# Patient Record
Sex: MALE | Race: BLACK OR AFRICAN AMERICAN | NOT HISPANIC OR LATINO | Employment: OTHER | URBAN - METROPOLITAN AREA
[De-identification: names, ages, dates, MRNs, and addresses within clinical notes are randomized per-mention and may not be internally consistent; named-entity substitution may affect disease eponyms.]

---

## 2017-03-28 LAB — HBA1C MFR BLD HPLC: 5.8 %

## 2018-06-04 LAB
CREAT ?TM UR-SCNC: 156 UMOL/L
HBA1C MFR BLD HPLC: 5.5 %
MICROALBUMIN UR-MCNC: 0.7 MG/L (ref 0–20)
MICROALBUMIN/CREAT UR: 4 MG/G{CREAT}

## 2018-09-10 ENCOUNTER — TELEPHONE (OUTPATIENT)
Dept: UROLOGY | Facility: AMBULATORY SURGERY CENTER | Age: 68
End: 2018-09-10

## 2018-09-10 NOTE — TELEPHONE ENCOUNTER
Reason for appointment/Complaint/Diagnosis : hx of elevated PSA     Insurance: Cy Kohli     History of [address]     Previous urologist?   [facility] cancer phone: [phone]              Records requested/where? Previous urologist     Outside testing/where? Previous urologist     Location Preference for office visit?  [facility]

## 2018-09-11 ENCOUNTER — TELEPHONE (OUTPATIENT)
Dept: UROLOGY | Facility: AMBULATORY SURGERY CENTER | Age: 68
End: 2018-09-11

## 2018-09-11 NOTE — TELEPHONE ENCOUNTER
Reason for appointment/Complaint/Diagnosis :     Insurance: Qing Morgan  If yes, what kind? Previous urologist? [address] [place]                 Records requested/where? YES RECORDS RECEIVED    Outside testing/where? [facility]    Location Preference for office visit?  [facility]

## 2018-09-24 NOTE — PROGRESS NOTES
9/25/2018    Jorden Fitting  [birth year]  [id number]    Discussion and Plan    Regarding his elevated PSA, I have recommend he undergo PSA evaluation at 6 month intervals for monitoring  Multiparametric MRI can be considered further PSA progression noted  Risks and benefits of various treatments for BPH also explained  As he is relatively asymptomatic with a postvoid residual of 26 cc, he will be observed for now  All questions answered at this time  1  Urinary retention  - POCT Measure PVR    2  Elevated PSA  - PSA Total, Diagnostic; Future  - PSA Total, Diagnostic; Future      Assessment      Patient Active Problem List   Diagnosis    Urinary retention    Elevated PSA       History of Present Illness    Terrence Pool is a 70 y o  male seen today in regards to a history of elevated PSA of 9 03  He managed by a urologist in [place] and underwent prostate biopsies April 2018 which were negative  Prostate volume was greater than 110 cc  The patient denies any issues relative to hematuria, urinary tract infection  No significant urinary complaints other than sporadic daytime urgency with otherwise fair flow incomplete bladder emptying sensation  He tried Flomax previously but did not tolerate medicine  Urinary Symptom Assessment        Past Medical History  Past Medical History:   Diagnosis Date    Constipation        Past Social History  Past Surgical History:   Procedure Laterality Date    ABDOMINAL SURGERY      HERNIA REPAIR  2010    HERNIA REPAIR  2016       Past Family History  Family History   Problem Relation Age of Onset    Hypertension Mother     Uterine cancer Sister        Past Social history  Social History     Social History    Marital status: /Civil Union     Spouse name: N/A    Number of children: N/A    Years of education: N/A     Occupational History    Not on file       Social History Main Topics    Smoking status: Never Smoker    Smokeless tobacco: Never Used    Alcohol use No    Drug use: No    Sexual activity: Not on file     Other Topics Concern    Not on file     Social History Narrative    No narrative on file       Current Medications  Current Outpatient Prescriptions   Medication Sig Dispense Refill    aspirin 81 mg chewable tablet Daily      docusate sodium (COLACE) 100 mg capsule daily      Multiple Vitamins-Minerals (PRESERVISION AREDS 2+MULTI VIT PO) 1 tab twice a day      Omega-3 1000 MG CAPS daily      Pediatric Multivitamins-Fl (MULTIVITAMINS/FL PO) daily      Probiotic Product (PROBIOTIC-10 PO) daily       No current facility-administered medications for this visit  Allergies  No Known Allergies    Past Medical History, Social History, Family History, medications and allergies were reviewed  Review of Systems  Review of Systems   Constitutional: Negative  HENT: Negative  Eyes: Negative  Respiratory: Negative  Cardiovascular: Negative  Gastrointestinal: Negative  Endocrine: Negative  Genitourinary: Positive for urgency  Negative for decreased urine volume, difficulty urinating and hematuria  Musculoskeletal: Negative  Skin: Negative  Neurological: Negative  Hematological: Negative  Psychiatric/Behavioral: Negative  Vitals  Vitals:    09/25/18 1022   BP: 118/82   BP Location: Left arm   Patient Position: Sitting   Cuff Size: Adult   Pulse: 74   Weight: 70 8 kg (156 lb)   Height: 5' 9" (1 753 m)         Physical Exam    Physical Exam   Constitutional: He is oriented to person, place, and time  He appears well-developed and well-nourished  HENT:   Head: Normocephalic and atraumatic  Eyes: Pupils are equal, round, and reactive to light  Neck: Normal range of motion  Cardiovascular: Normal rate, regular rhythm and normal heart sounds  Pulmonary/Chest: Effort normal and breath sounds normal  No accessory muscle usage  No respiratory distress  Abdominal: Soft   Normal appearance and bowel sounds are normal  There is no tenderness  Genitourinary: Rectum normal, prostate normal and penis normal  No penile tenderness  Genitourinary Comments: Prostate greater than 70 g with no nodules  Musculoskeletal: Normal range of motion  Neurological: He is alert and oriented to person, place, and time  Skin: Skin is warm, dry and intact  Psychiatric: He has a normal mood and affect  His speech is normal  Cognition and memory are normal    Nursing note and vitals reviewed  Results    Below listed labs, pathology results, and radiology images were personally reviewed:    No results found for: PSA  No results found for: GLUCOSE, CALCIUM, NA, K, CO2, CL, BUN, CREATININE  No results found for: WBC, HGB, HCT, MCV, PLT    No results found for this or any previous visit (from the past 1 hour(s))  ]      Post Void Residual Measurement:  After voiding to completion the patient was brought to the exam room and positioned supine    Residual urine volume was measured with an ultrasound at:    26 ml

## 2018-09-25 ENCOUNTER — OFFICE VISIT (OUTPATIENT)
Dept: UROLOGY | Facility: AMBULATORY SURGERY CENTER | Age: 68
End: 2018-09-25
Payer: COMMERCIAL

## 2018-09-25 VITALS
SYSTOLIC BLOOD PRESSURE: 118 MMHG | BODY MASS INDEX: 23.11 KG/M2 | HEIGHT: 69 IN | WEIGHT: 156 LBS | DIASTOLIC BLOOD PRESSURE: 82 MMHG | HEART RATE: 74 BPM

## 2018-09-25 DIAGNOSIS — R33.9 URINARY RETENTION: Primary | ICD-10-CM

## 2018-09-25 DIAGNOSIS — R97.20 ELEVATED PSA: ICD-10-CM

## 2018-09-25 LAB — POST-VOID RESIDUAL VOLUME, ML POC: 26 ML

## 2018-09-25 PROCEDURE — 51798 US URINE CAPACITY MEASURE: CPT | Performed by: UROLOGY

## 2018-09-25 PROCEDURE — 99203 OFFICE O/P NEW LOW 30 MIN: CPT | Performed by: UROLOGY

## 2018-09-25 RX ORDER — CHLORAL HYDRATE 500 MG
CAPSULE ORAL
COMMUNITY
End: 2020-10-08 | Stop reason: ALTCHOICE

## 2018-09-25 RX ORDER — ASPIRIN 81 MG/1
81 TABLET, CHEWABLE ORAL DAILY
COMMUNITY
End: 2019-01-09

## 2018-09-25 RX ORDER — DOCUSATE SODIUM 100 MG/1
100 CAPSULE, LIQUID FILLED ORAL AS NEEDED
COMMUNITY

## 2018-11-16 ENCOUNTER — RX ONLY (RX ONLY)
Age: 68
End: 2018-11-16

## 2018-11-16 ENCOUNTER — DOCTOR'S OFFICE (OUTPATIENT)
Dept: URBAN - METROPOLITAN AREA CLINIC 137 | Facility: CLINIC | Age: 68
Setting detail: OPHTHALMOLOGY
End: 2018-11-16
Payer: COMMERCIAL

## 2018-11-16 DIAGNOSIS — H40.033: ICD-10-CM

## 2018-11-16 DIAGNOSIS — H40.013: ICD-10-CM

## 2018-11-16 PROCEDURE — 76514 ECHO EXAM OF EYE THICKNESS: CPT | Performed by: OPTOMETRIST

## 2018-11-16 PROCEDURE — 92133 CPTRZD OPH DX IMG PST SGM ON: CPT | Performed by: OPTOMETRIST

## 2018-11-16 PROCEDURE — 92004 COMPRE OPH EXAM NEW PT 1/>: CPT | Performed by: OPTOMETRIST

## 2018-11-16 ASSESSMENT — REFRACTION_CURRENTRX
OD_AXIS: 50
OD_SPHERE: +1.50
OD_VPRISM_DIRECTION: PROGS
OS_VPRISM_DIRECTION: PROGS
OS_OVR_VA: 20/
OD_OVR_VA: 20/
OS_ADD: +2.50
OS_OVR_VA: 20/
OS_SPHERE: +1.25
OD_OVR_VA: 20/
OS_CYLINDER: -0.75
OD_OVR_VA: 20/
OS_OVR_VA: 20/
OD_ADD: +2.50
OS_AXIS: 95
OD_CYLINDER: -0.75

## 2018-11-16 ASSESSMENT — PACHYMETRY
OS_CT_CORRECTION: 1
OD_CT_CORRECTION: 1
OD_CT_UM: 538
OS_CT_UM: 531

## 2018-11-16 ASSESSMENT — VISUAL ACUITY
OD_BCVA: 20/25-1
OS_BCVA: 20/25

## 2018-11-16 ASSESSMENT — REFRACTION_MANIFEST
OD_VA2: 20/
OD_VA3: 20/
OS_VA3: 20/
OD_VA3: 20/
OU_VA: 20/
OS_VA1: 20/
OD_VA1: 20/
OD_VA1: 20/
OU_VA: 20/
OS_VA3: 20/
OD_VA2: 20/
OS_VA1: 20/
OS_VA2: 20/
OS_VA2: 20/

## 2018-11-16 ASSESSMENT — REFRACTION_AUTOREFRACTION
OD_AXIS: 49
OS_SPHERE: +2.25
OD_CYLINDER: -0.50
OS_AXIS: 77
OS_CYLINDER: -1.00
OD_SPHERE: +1.75

## 2018-11-16 ASSESSMENT — SPHEQUIV_DERIVED
OS_SPHEQUIV: 1.75
OD_SPHEQUIV: 1.5

## 2018-11-16 ASSESSMENT — CONFRONTATIONAL VISUAL FIELD TEST (CVF)
OS_FINDINGS: FULL
OD_FINDINGS: FULL

## 2019-01-02 ENCOUNTER — DOCTOR'S OFFICE (OUTPATIENT)
Dept: URBAN - METROPOLITAN AREA CLINIC 137 | Facility: CLINIC | Age: 69
Setting detail: OPHTHALMOLOGY
End: 2019-01-02
Payer: COMMERCIAL

## 2019-01-02 DIAGNOSIS — H40.033: ICD-10-CM

## 2019-01-02 DIAGNOSIS — H40.013: ICD-10-CM

## 2019-01-02 PROCEDURE — 99212 OFFICE O/P EST SF 10 MIN: CPT | Performed by: OPTOMETRIST

## 2019-01-02 PROCEDURE — 92083 EXTENDED VISUAL FIELD XM: CPT | Performed by: OPTOMETRIST

## 2019-01-02 ASSESSMENT — REFRACTION_MANIFEST
OS_VA2: 20/
OS_VA2: 20/
OS_VA1: 20/
OD_VA1: 20/
OD_VA1: 20/
OS_VA3: 20/
OU_VA: 20/
OU_VA: 20/
OD_VA2: 20/
OD_VA3: 20/
OD_VA3: 20/
OD_VA2: 20/
OS_VA1: 20/
OS_VA3: 20/

## 2019-01-02 ASSESSMENT — REFRACTION_AUTOREFRACTION
OS_SPHERE: +2.25
OD_AXIS: 49
OD_SPHERE: +1.75
OD_CYLINDER: -0.50
OS_AXIS: 77
OS_CYLINDER: -1.00

## 2019-01-02 ASSESSMENT — REFRACTION_CURRENTRX
OD_OVR_VA: 20/
OD_OVR_VA: 20/
OD_VPRISM_DIRECTION: PROGS
OD_OVR_VA: 20/
OS_CYLINDER: -0.75
OD_SPHERE: +1.50
OS_OVR_VA: 20/
OS_AXIS: 95
OS_OVR_VA: 20/
OS_VPRISM_DIRECTION: PROGS
OD_AXIS: 50
OD_CYLINDER: -0.75
OS_OVR_VA: 20/
OD_ADD: +2.50
OS_SPHERE: +1.25
OS_ADD: +2.50

## 2019-01-02 ASSESSMENT — CONFRONTATIONAL VISUAL FIELD TEST (CVF)
OD_FINDINGS: FULL
OS_FINDINGS: FULL

## 2019-01-02 ASSESSMENT — VISUAL ACUITY
OS_BCVA: 20/25
OD_BCVA: 20/25

## 2019-01-02 ASSESSMENT — SPHEQUIV_DERIVED
OD_SPHEQUIV: 1.5
OS_SPHEQUIV: 1.75

## 2019-01-09 ENCOUNTER — OFFICE VISIT (OUTPATIENT)
Dept: INTERNAL MEDICINE CLINIC | Facility: CLINIC | Age: 69
End: 2019-01-09
Payer: COMMERCIAL

## 2019-01-09 VITALS
DIASTOLIC BLOOD PRESSURE: 68 MMHG | TEMPERATURE: 98 F | OXYGEN SATURATION: 98 % | SYSTOLIC BLOOD PRESSURE: 108 MMHG | HEART RATE: 72 BPM | WEIGHT: 156.6 LBS | BODY MASS INDEX: 21.92 KG/M2 | HEIGHT: 71 IN

## 2019-01-09 DIAGNOSIS — E55.9 VITAMIN D DEFICIENCY: ICD-10-CM

## 2019-01-09 DIAGNOSIS — Z23 NEED FOR PNEUMOCOCCAL VACCINE: ICD-10-CM

## 2019-01-09 DIAGNOSIS — Z12.11 SCREENING FOR COLON CANCER: Primary | ICD-10-CM

## 2019-01-09 DIAGNOSIS — E56.9 VITAMIN DEFICIENCY: ICD-10-CM

## 2019-01-09 DIAGNOSIS — Z12.12 ENCOUNTER FOR COLORECTAL CANCER SCREENING: ICD-10-CM

## 2019-01-09 DIAGNOSIS — E78.2 MIXED HYPERLIPIDEMIA: ICD-10-CM

## 2019-01-09 DIAGNOSIS — Z12.11 ENCOUNTER FOR COLORECTAL CANCER SCREENING: ICD-10-CM

## 2019-01-09 DIAGNOSIS — K40.91 RECURRENT INGUINAL HERNIA WITHOUT OBSTRUCTION OR GANGRENE, UNSPECIFIED LATERALITY: ICD-10-CM

## 2019-01-09 DIAGNOSIS — R73.09 ELEVATED HEMOGLOBIN A1C: ICD-10-CM

## 2019-01-09 DIAGNOSIS — Z23 IMMUNIZATION DUE: ICD-10-CM

## 2019-01-09 PROBLEM — K40.90 INGUINAL HERNIA: Status: ACTIVE | Noted: 2019-01-09

## 2019-01-09 PROBLEM — E78.5 HYPERLIPIDEMIA: Status: ACTIVE | Noted: 2019-01-09

## 2019-01-09 PROBLEM — Z00.00 ANNUAL PHYSICAL EXAM: Status: ACTIVE | Noted: 2019-01-09

## 2019-01-09 PROBLEM — Q43.3 INTESTINAL MALROTATION: Status: ACTIVE | Noted: 2019-01-09

## 2019-01-09 PROCEDURE — 99203 OFFICE O/P NEW LOW 30 MIN: CPT | Performed by: INTERNAL MEDICINE

## 2019-01-09 PROCEDURE — 4040F PNEUMOC VAC/ADMIN/RCVD: CPT

## 2019-01-09 PROCEDURE — 1101F PT FALLS ASSESS-DOCD LE1/YR: CPT | Performed by: INTERNAL MEDICINE

## 2019-01-09 PROCEDURE — 90670 PCV13 VACCINE IM: CPT

## 2019-01-09 PROCEDURE — 3008F BODY MASS INDEX DOCD: CPT | Performed by: INTERNAL MEDICINE

## 2019-01-09 PROCEDURE — 3725F SCREEN DEPRESSION PERFORMED: CPT | Performed by: INTERNAL MEDICINE

## 2019-01-09 PROCEDURE — 1036F TOBACCO NON-USER: CPT | Performed by: INTERNAL MEDICINE

## 2019-01-09 PROCEDURE — 1160F RVW MEDS BY RX/DR IN RCRD: CPT

## 2019-01-09 PROCEDURE — G0009 ADMIN PNEUMOCOCCAL VACCINE: HCPCS

## 2019-01-09 RX ORDER — MULTIVITAMIN
1 TABLET ORAL DAILY
COMMUNITY

## 2019-01-09 RX ORDER — THIAMINE HCL 100 MG
1 TABLET ORAL DAILY
COMMUNITY

## 2019-01-09 RX ORDER — INFLUENZA A VIRUSA/MICHIGAN/45/2015 X-275 (H1N1) ANTIGEN (FORMALDEHYDE INACTIVATED), INFLUENZA A VIRUS A/HONG KONG/4801/2014 X-263B (H3N2) ANTIGEN (FORMALDEHYDE INACTIVATED), AND INFLUENZA B VIRUS B/BRISBANE/60/2008 ANTIGEN (FORMALDEHYDE INACTIVATED) 60; 60; 60 UG/.5ML; UG/.5ML; UG/.5ML
INJECTION, SUSPENSION INTRAMUSCULAR
COMMUNITY
Start: 2018-10-03 | End: 2019-06-03

## 2019-01-09 NOTE — ASSESSMENT & PLAN NOTE
Regarding immunization:  He has never received pneumococcal immunization, and received zoster immunization in 2016 (not Shingrix)  Otherwise, he is also due for Tdap as last immunization was greater than 10 years ago  He has received influenza immunization this flu season  He is up-to-date on colorectal cancer screening as well as metabolic screening with blood work

## 2019-01-09 NOTE — ASSESSMENT & PLAN NOTE
Due for pneumococcal immunization, shingrix, and Tdap  He will of contact his pharmacy regarding Shingrix  Pneumococcal 13 Valent administered today, 23 Valent to be administered in 1 year  Otherwise, he is also due for Tdap as last immunization was greater than 10 years ago  He has received influenza immunization this flu season

## 2019-01-09 NOTE — PROGRESS NOTES
Assessment/Plan:    Immunization due  Due for pneumococcal immunization, shingrix, and Tdap  He will of contact his pharmacy regarding Shingrix  Pneumococcal 13 Valent administered today, 23 Valent to be administered in 1 year  Otherwise, he is also due for Tdap as last immunization was greater than 10 years ago  He has received influenza immunization this flu season  Encounter for colorectal cancer screening  Next colonoscopy due in 2022  Hyperlipidemia  Stable  Continue with lifestyle modifications  Recheck lipid panel in 6 months  Vitamin deficiency  Stable  Continue with vitamin supplementation  Recheck vitamin-D in 6 months  Elevated hemoglobin A1c  Stable  Continue with lifestyle modifications  Will recheck A1c in 6 months  Diagnoses and all orders for this visit:    Screening for colon cancer    Immunization due    Encounter for colorectal cancer screening    Elevated hemoglobin A1c  -     Cancel: Hemoglobin A1C; Future  -     Comprehensive metabolic panel; Future  -     Hemoglobin A1C; Future  -     TSH, 3rd generation with Free T4 reflex; Future    Vitamin D deficiency  -     Vitamin D 25 hydroxy; Future    Need for pneumococcal vaccine  -     PNEUMOCOCCAL CONJUGATE VACCINE 13-VALENT GREATER THAN 6 MONTHS    Mixed hyperlipidemia  -     CBC; Future  -     Comprehensive metabolic panel; Future  -     Lipid panel; Future  -     Hemoglobin A1C; Future  -     TSH, 3rd generation with Free T4 reflex; Future    Recurrent inguinal hernia without obstruction or gangrene, unspecified laterality    Vitamin deficiency    Other orders  -     Cancel: Ambulatory referral to Gastroenterology; Future  -     Multiple Vitamin (MULTIVITAMIN) tablet; Take 1 tablet by mouth daily  -     Cyanocobalamin (VITAMIN B-12) 2500 MCG SUBL; Place 1 tablet under the tongue daily  -     FLUZONE HIGH-DOSE 0 5 ML HITESH;   -     Cancel: CBC; Future  -     Cancel: Comprehensive metabolic panel;  Future  - Cancel: Lipid panel; Future  -     Cancel: TSH, 3rd generation with Free T4 reflex; Future          Subjective:      Patient ID: Valeriy Mata is a 70 y o  male  65-year-old male is seen today to establish care  He has a past medical history of multiple inguinal hernias status post repairs, intestinal malrotation, h/o HLD (no longer on statin therapy), BPH, and vitamin-D deficiency  He has an appointment scheduled with Urology in March 2019  No active complaints or concerns at this time  Lab studies from 6/2018 reviewed, of lipid panel, TSH, CMP, CBC and urinalysis were within normal range  Last colonoscopy was 2012, next due in 2022  He never smoke tobacco, rare alcohol use, and denies any illicit drug use  He exercises regularly and eats a healthy diet  The following portions of the patient's history were reviewed and updated as appropriate: allergies, current medications, past family history, past medical history, past social history, past surgical history and problem list     Review of Systems   Constitutional: Negative for activity change, appetite change, chills, diaphoresis, fatigue and fever  HENT: Negative for congestion, postnasal drip, rhinorrhea, sinus pain, sinus pressure, sneezing and sore throat  Eyes: Negative for visual disturbance  Respiratory: Negative for apnea, cough, choking, chest tightness, shortness of breath and wheezing  Cardiovascular: Negative for chest pain, palpitations and leg swelling  Gastrointestinal: Negative for abdominal distention, abdominal pain, anal bleeding, blood in stool, constipation, diarrhea, nausea and vomiting  Endocrine: Negative for cold intolerance and heat intolerance  Genitourinary: Negative for difficulty urinating, dysuria and hematuria  Musculoskeletal: Negative  Skin: Negative  Neurological: Negative for dizziness, weakness, light-headedness, numbness and headaches  Hematological: Negative for adenopathy  Psychiatric/Behavioral: Negative for agitation, sleep disturbance and suicidal ideas  All other systems reviewed and are negative  Past Medical History:   Diagnosis Date    Constipation          Current Outpatient Prescriptions:     Cyanocobalamin (VITAMIN B-12) 2500 MCG SUBL, Place 1 tablet under the tongue daily, Disp: , Rfl:     docusate sodium (COLACE) 100 mg capsule, 1 capsule daily  , Disp: , Rfl:     FLUZONE HIGH-DOSE 0 5 ML HITESH, , Disp: , Rfl:     Multiple Vitamin (MULTIVITAMIN) tablet, Take 1 tablet by mouth daily, Disp: , Rfl:     Omega-3 1000 MG CAPS, 1 capsule daily  , Disp: , Rfl:     Probiotic Product (PROBIOTIC-10 PO), 1 tablet daily  , Disp: , Rfl:     No Known Allergies    Social History   Past Surgical History:   Procedure Laterality Date    ABDOMINAL SURGERY      BUNIONECTOMY      HERNIA REPAIR  2010    HERNIA REPAIR  2016    HERNIA REPAIR       Family History   Problem Relation Age of Onset    Hypertension Mother     Uterine cancer Sister        Objective:  /68 (BP Location: Left arm, Patient Position: Sitting, Cuff Size: Adult)   Pulse 72   Temp 98 °F (36 7 °C) (Oral)   Ht 5' 11" (1 803 m)   Wt 71 kg (156 lb 9 6 oz)   SpO2 98% Comment: room air  BMI 21 84 kg/m²     No results found for this or any previous visit (from the past 1344 hour(s))  Physical Exam   Constitutional: He is oriented to person, place, and time  He appears well-developed and well-nourished  No distress  HENT:   Head: Normocephalic and atraumatic  Eyes: Pupils are equal, round, and reactive to light  Conjunctivae and EOM are normal  Right eye exhibits no discharge  Left eye exhibits no discharge  No scleral icterus  Neck: Normal range of motion  Neck supple  No JVD present  No thyromegaly present  Cardiovascular: Normal rate, regular rhythm, normal heart sounds and intact distal pulses  Exam reveals no gallop and no friction rub  No murmur heard    Pulmonary/Chest: Effort normal and breath sounds normal  No respiratory distress  He has no wheezes  He has no rales  He exhibits no tenderness  Abdominal: Soft  Bowel sounds are normal  He exhibits no distension and no mass  There is no tenderness  There is no rebound and no guarding  Musculoskeletal: Normal range of motion  He exhibits no edema, tenderness or deformity  Lymphadenopathy:     He has no cervical adenopathy  Neurological: He is alert and oriented to person, place, and time  He has normal reflexes  No cranial nerve deficit  Coordination normal    Skin: Skin is warm and dry  No rash noted  He is not diaphoretic  No erythema  No pallor  Psychiatric: He has a normal mood and affect  His behavior is normal  Judgment and thought content normal    Nursing note and vitals reviewed

## 2019-03-18 ENCOUNTER — APPOINTMENT (OUTPATIENT)
Dept: LAB | Facility: CLINIC | Age: 69
End: 2019-03-18
Payer: COMMERCIAL

## 2019-03-18 DIAGNOSIS — R73.09 ELEVATED HEMOGLOBIN A1C: ICD-10-CM

## 2019-03-18 DIAGNOSIS — R97.20 ELEVATED PSA: ICD-10-CM

## 2019-03-18 DIAGNOSIS — E78.2 MIXED HYPERLIPIDEMIA: ICD-10-CM

## 2019-03-18 LAB
PSA SERPL-MCNC: 5.1 NG/ML (ref 0–4)
TSH SERPL DL<=0.05 MIU/L-ACNC: 1.76 UIU/ML (ref 0.36–3.74)

## 2019-03-18 PROCEDURE — 36415 COLL VENOUS BLD VENIPUNCTURE: CPT

## 2019-03-18 PROCEDURE — 84443 ASSAY THYROID STIM HORMONE: CPT

## 2019-03-18 PROCEDURE — 84153 ASSAY OF PSA TOTAL: CPT

## 2019-03-22 NOTE — PROGRESS NOTES
3/25/2019    Rise Ramo  1950  [id number]      Assessment  -Elevated PSA  -Urinary retention     Discussion/Plan  Feng Solomon is a 70 y o  male being managed by Dr Christina Yadav        -We reviewed results of recent PSA which is 5 1  He is status post negative prostate biopsy in April 2018 by outside urologist   His PSA range is 2 6-9 03  Patient continues to do well from a urinary standpoint  We discussed rechecking PSA in 6 months  Will also perform digital rectal exam at that time  He was instructed to call with any issues  -All questions answered, patient agrees with plan      History of Present Illness  70 y o  male with a history of elevated PSA and urinary retention presents today for follow up  Patient previously known to urologist in [place]  He underwent prostate biopsy in April 2018 which was negative  His PSA was as high as 9 03  His prostate was found to be >110cc  Patient's PSA range is 2 6-9 03  He continues to report urgency and weak stream, however he does not find bothersome  He had previously been prescribed Flomax for acute urinary retention  However, this resolved and he discontinued medication  Patient denies any episodes of gross hematuria or dysuria  No reports of strong family history of prostate cancer  Review of Systems  Review of Systems   Constitutional: Negative  HENT: Negative  Respiratory: Negative  Cardiovascular: Negative  Gastrointestinal: Negative  Genitourinary: Negative for decreased urine volume, difficulty urinating, dysuria, flank pain, frequency and hematuria  Urgency: Occasional    Musculoskeletal: Negative  Skin: Negative  Neurological: Negative  Psychiatric/Behavioral: Negative  AUA SYMPTOM SCORE      Most Recent Value   AUA SYMPTOM SCORE   How often have you had a sensation of not emptying your bladder completely after you finished urinating?   2   How often have you had to urinate again less than two hours after you finished urinating? 3   How often have you found you stopped and started again several times when you urinate? 3   How often have you found it difficult to postpone urination? 2   How often have you had a weak urinary stream?  1   How often have you had to push or strain to begin urination? 1   How many times did you most typically get up to urinate from the time you went to bed at night until the time you got up in the morning?   1   Quality of Life: If you were to spend the rest of your life with your urinary condition just the way it is now, how would you feel about that?  2   AUA SYMPTOM SCORE  13            Past Medical History  Past Medical History:   Diagnosis Date    Constipation        Past Social History  Past Surgical History:   Procedure Laterality Date    ABDOMINAL SURGERY      BUNIONECTOMY      HERNIA REPAIR  2010    HERNIA REPAIR  2016    HERNIA REPAIR         Past Family History  Family History   Problem Relation Age of Onset    Hypertension Mother     Uterine cancer Sister        Past Social history  Social History     Socioeconomic History    Marital status: /Civil Union     Spouse name: Not on file    Number of children: Not on file    Years of education: Not on file    Highest education level: Not on file   Occupational History    Not on file   Social Needs    Financial resource strain: Not on file    Food insecurity:     Worry: Not on file     Inability: Not on file    Transportation needs:     Medical: Not on file     Non-medical: Not on file   Tobacco Use    Smoking status: Never Smoker    Smokeless tobacco: Never Used   Substance and Sexual Activity    Alcohol use: No    Drug use: No    Sexual activity: Not on file   Lifestyle    Physical activity:     Days per week: Not on file     Minutes per session: Not on file    Stress: Not on file   Relationships    Social connections:     Talks on phone: Not on file     Gets together: Not on file     Attends Mandaeism service: Not on file     Active member of club or organization: Not on file     Attends meetings of clubs or organizations: Not on file     Relationship status: Not on file    Intimate partner violence:     Fear of current or ex partner: Not on file     Emotionally abused: Not on file     Physically abused: Not on file     Forced sexual activity: Not on file   Other Topics Concern    Not on file   Social History Narrative    Not on file       Current Medications  Current Outpatient Medications   Medication Sig Dispense Refill    Cyanocobalamin (VITAMIN B-12) 2500 MCG SUBL Place 1 tablet under the tongue daily      docusate sodium (COLACE) 100 mg capsule 1 capsule daily   FLUZONE HIGH-DOSE 0 5 ML HITESH       Multiple Vitamin (MULTIVITAMIN) tablet Take 1 tablet by mouth daily      Omega-3 1000 MG CAPS 1 capsule daily   Probiotic Product (PROBIOTIC-10 PO) 1 tablet daily  No current facility-administered medications for this visit  Allergies  No Known Allergies    Past Medical History, Social History, Family History, medications and allergies were reviewed  Vitals  There were no vitals filed for this visit  Physical Exam  Physical Exam   Constitutional: He is oriented to person, place, and time  He appears well-developed and well-nourished  HENT:   Head: Normocephalic  Eyes: Pupils are equal, round, and reactive to light  Neck: Normal range of motion  Cardiovascular: Normal rate and regular rhythm  Pulmonary/Chest: Effort normal    Abdominal: Soft  Normal appearance  There is no CVA tenderness  Musculoskeletal: Normal range of motion  Neurological: He is alert and oriented to person, place, and time  Skin: Skin is warm and dry  Psychiatric: He has a normal mood and affect   His behavior is normal  Judgment and thought content normal        Results    I have personally reviewed all pertinent lab results and reviewed with patient  Lab Results   Component Value Date PSA 5 1 (H) 03/18/2019     No results found for: GLUCOSE, CALCIUM, NA, K, CO2, CL, BUN, CREATININE  No results found for: WBC, HGB, HCT, MCV, PLT  No results found for this or any previous visit (from the past 1 hour(s))

## 2019-03-25 ENCOUNTER — OFFICE VISIT (OUTPATIENT)
Dept: UROLOGY | Facility: AMBULATORY SURGERY CENTER | Age: 69
End: 2019-03-25
Payer: COMMERCIAL

## 2019-03-25 VITALS
HEIGHT: 69 IN | BODY MASS INDEX: 23.7 KG/M2 | HEART RATE: 60 BPM | DIASTOLIC BLOOD PRESSURE: 60 MMHG | WEIGHT: 160 LBS | SYSTOLIC BLOOD PRESSURE: 112 MMHG

## 2019-03-25 DIAGNOSIS — R97.20 ELEVATED PSA: Primary | ICD-10-CM

## 2019-03-25 DIAGNOSIS — Z87.898 HISTORY OF URINARY RETENTION: ICD-10-CM

## 2019-03-25 PROCEDURE — 99214 OFFICE O/P EST MOD 30 MIN: CPT | Performed by: NURSE PRACTITIONER

## 2019-03-27 ENCOUNTER — DOCTOR'S OFFICE (OUTPATIENT)
Dept: URBAN - METROPOLITAN AREA CLINIC 137 | Facility: CLINIC | Age: 69
Setting detail: OPHTHALMOLOGY
End: 2019-03-27
Payer: COMMERCIAL

## 2019-03-27 ENCOUNTER — RX ONLY (RX ONLY)
Age: 69
End: 2019-03-27

## 2019-03-27 DIAGNOSIS — H52.4: ICD-10-CM

## 2019-03-27 PROCEDURE — 92015 DETERMINE REFRACTIVE STATE: CPT | Performed by: OPTOMETRIST

## 2019-03-27 ASSESSMENT — REFRACTION_CURRENTRX
OS_OVR_VA: 20/
OD_OVR_VA: 20/
OD_SPHERE: +1.00
OD_OVR_VA: 20/
OD_CYLINDER: +0.25
OD_VPRISM_DIRECTION: PROGS
OS_SPHERE: +1.00
OS_VPRISM_DIRECTION: PROGS
OS_OVR_VA: 20/
OD_OVR_VA: 20/
OD_AXIS: 129
OS_OVR_VA: 20/
OS_CYLINDER: +0.50
OD_ADD: +2.25
OS_ADD: +2.25
OS_AXIS: 002

## 2019-03-27 ASSESSMENT — REFRACTION_MANIFEST
OS_AXIS: 090
OD_VA3: 20/
OS_SPHERE: +1.50
OD_CYLINDER: -0.50
OS_VA2: 20/
OU_VA: 20/
OD_SPHERE: +1.50
OD_VA3: 20/
OD_VA2: 20/
OD_ADD: +2.25
OS_VA1: 20/
OU_VA: 20/20
OS_VA3: 20/
OS_VA1: 20/20
OS_VA3: 20/
OD_VA1: 20/
OD_VA1: 20/20
OS_ADD: +2.25
OS_CYLINDER: -0.50
OD_AXIS: 130
OS_VA2: 20/
OD_VA2: 20/

## 2019-03-27 ASSESSMENT — SPHEQUIV_DERIVED
OD_SPHEQUIV: 1.25
OS_SPHEQUIV: 1.5
OS_SPHEQUIV: 1.25
OD_SPHEQUIV: 1.75

## 2019-03-27 ASSESSMENT — REFRACTION_AUTOREFRACTION
OD_SPHERE: +2.25
OS_CYLINDER: -1.50
OD_CYLINDER: -1.00
OS_SPHERE: +2.25
OS_AXIS: 88
OD_AXIS: 38

## 2019-03-27 ASSESSMENT — VISUAL ACUITY
OD_BCVA: 20/25
OS_BCVA: 20/20-1

## 2019-04-03 ENCOUNTER — DOCTOR'S OFFICE (OUTPATIENT)
Dept: URBAN - METROPOLITAN AREA CLINIC 137 | Facility: CLINIC | Age: 69
Setting detail: OPHTHALMOLOGY
End: 2019-04-03
Payer: COMMERCIAL

## 2019-04-03 DIAGNOSIS — H40.013: ICD-10-CM

## 2019-04-03 PROCEDURE — 99212 OFFICE O/P EST SF 10 MIN: CPT | Performed by: OPTOMETRIST

## 2019-04-03 ASSESSMENT — REFRACTION_MANIFEST
OD_AXIS: 130
OS_VA1: 20/20
OS_VA2: 20/
OS_VA2: 20/
OD_VA3: 20/
OD_VA1: 20/
OD_VA2: 20/
OS_AXIS: 090
OD_SPHERE: +1.50
OS_VA3: 20/
OD_VA2: 20/
OU_VA: 20/
OD_ADD: +2.25
OD_VA1: 20/20
OS_SPHERE: +1.50
OU_VA: 20/20
OD_CYLINDER: -0.50
OS_VA1: 20/
OS_ADD: +2.25
OS_CYLINDER: -0.50
OS_VA3: 20/
OD_VA3: 20/

## 2019-04-03 ASSESSMENT — REFRACTION_CURRENTRX
OS_ADD: +2.25
OS_SPHERE: +1.00
OS_OVR_VA: 20/
OS_OVR_VA: 20/
OS_VPRISM_DIRECTION: PROGS
OD_OVR_VA: 20/
OD_OVR_VA: 20/
OD_ADD: +2.25
OS_OVR_VA: 20/
OD_VPRISM_DIRECTION: PROGS
OS_CYLINDER: +0.50
OD_SPHERE: +1.00
OD_CYLINDER: +0.25
OD_OVR_VA: 20/
OD_AXIS: 129
OS_AXIS: 002

## 2019-04-03 ASSESSMENT — CONFRONTATIONAL VISUAL FIELD TEST (CVF)
OD_FINDINGS: FULL
OS_FINDINGS: FULL

## 2019-04-03 ASSESSMENT — REFRACTION_AUTOREFRACTION
OD_SPHERE: +2.25
OD_CYLINDER: -1.00
OS_SPHERE: +2.25
OS_CYLINDER: -1.50
OS_AXIS: 88
OD_AXIS: 38

## 2019-04-03 ASSESSMENT — SPHEQUIV_DERIVED
OD_SPHEQUIV: 1.75
OD_SPHEQUIV: 1.25
OS_SPHEQUIV: 1.5
OS_SPHEQUIV: 1.25

## 2019-04-03 ASSESSMENT — VISUAL ACUITY
OD_BCVA: 20/20-2
OS_BCVA: 20/20-2

## 2019-05-28 ENCOUNTER — APPOINTMENT (OUTPATIENT)
Dept: LAB | Facility: CLINIC | Age: 69
End: 2019-05-28
Payer: COMMERCIAL

## 2019-05-28 DIAGNOSIS — E55.9 VITAMIN D DEFICIENCY: ICD-10-CM

## 2019-05-28 DIAGNOSIS — E78.2 MIXED HYPERLIPIDEMIA: ICD-10-CM

## 2019-05-28 DIAGNOSIS — R73.09 ELEVATED HEMOGLOBIN A1C: ICD-10-CM

## 2019-05-28 LAB
25(OH)D3 SERPL-MCNC: 25.4 NG/ML (ref 30–100)
ALBUMIN SERPL BCP-MCNC: 4.1 G/DL (ref 3.5–5)
ALP SERPL-CCNC: 58 U/L (ref 46–116)
ALT SERPL W P-5'-P-CCNC: 18 U/L (ref 12–78)
ANION GAP SERPL CALCULATED.3IONS-SCNC: 7 MMOL/L (ref 4–13)
AST SERPL W P-5'-P-CCNC: 13 U/L (ref 5–45)
BILIRUB SERPL-MCNC: 0.42 MG/DL (ref 0.2–1)
BUN SERPL-MCNC: 19 MG/DL (ref 5–25)
CALCIUM SERPL-MCNC: 8.8 MG/DL (ref 8.3–10.1)
CHLORIDE SERPL-SCNC: 111 MMOL/L (ref 100–108)
CHOLEST SERPL-MCNC: 228 MG/DL (ref 50–200)
CO2 SERPL-SCNC: 26 MMOL/L (ref 21–32)
CREAT SERPL-MCNC: 1.11 MG/DL (ref 0.6–1.3)
ERYTHROCYTE [DISTWIDTH] IN BLOOD BY AUTOMATED COUNT: 18.2 % (ref 11.6–15.1)
EST. AVERAGE GLUCOSE BLD GHB EST-MCNC: 120 MG/DL
GFR SERPL CREATININE-BSD FRML MDRD: 78 ML/MIN/1.73SQ M
GLUCOSE P FAST SERPL-MCNC: 91 MG/DL (ref 65–99)
HBA1C MFR BLD: 5.8 % (ref 4.2–6.3)
HCT VFR BLD AUTO: 45.9 % (ref 36.5–49.3)
HDLC SERPL-MCNC: 92 MG/DL (ref 40–60)
HGB BLD-MCNC: 14 G/DL (ref 12–17)
LDLC SERPL CALC-MCNC: 124 MG/DL (ref 0–100)
MCH RBC QN AUTO: 22.1 PG (ref 26.8–34.3)
MCHC RBC AUTO-ENTMCNC: 30.5 G/DL (ref 31.4–37.4)
MCV RBC AUTO: 73 FL (ref 82–98)
NONHDLC SERPL-MCNC: 136 MG/DL
PLATELET # BLD AUTO: 263 THOUSANDS/UL (ref 149–390)
PMV BLD AUTO: 11 FL (ref 8.9–12.7)
POTASSIUM SERPL-SCNC: 4.1 MMOL/L (ref 3.5–5.3)
PROT SERPL-MCNC: 7.3 G/DL (ref 6.4–8.2)
RBC # BLD AUTO: 6.33 MILLION/UL (ref 3.88–5.62)
SODIUM SERPL-SCNC: 144 MMOL/L (ref 136–145)
TRIGL SERPL-MCNC: 62 MG/DL
TSH SERPL DL<=0.05 MIU/L-ACNC: 2.41 UIU/ML (ref 0.36–3.74)
WBC # BLD AUTO: 5.33 THOUSAND/UL (ref 4.31–10.16)

## 2019-05-28 PROCEDURE — 80061 LIPID PANEL: CPT

## 2019-05-28 PROCEDURE — 85027 COMPLETE CBC AUTOMATED: CPT

## 2019-05-28 PROCEDURE — 80053 COMPREHEN METABOLIC PANEL: CPT

## 2019-05-28 PROCEDURE — 36415 COLL VENOUS BLD VENIPUNCTURE: CPT

## 2019-05-28 PROCEDURE — 83036 HEMOGLOBIN GLYCOSYLATED A1C: CPT

## 2019-05-28 PROCEDURE — 84443 ASSAY THYROID STIM HORMONE: CPT

## 2019-05-28 PROCEDURE — 82306 VITAMIN D 25 HYDROXY: CPT

## 2019-05-29 PROBLEM — E55.9 VITAMIN D DEFICIENCY: Status: ACTIVE | Noted: 2019-05-29

## 2019-06-03 ENCOUNTER — OFFICE VISIT (OUTPATIENT)
Dept: INTERNAL MEDICINE CLINIC | Facility: CLINIC | Age: 69
End: 2019-06-03
Payer: COMMERCIAL

## 2019-06-03 VITALS
HEART RATE: 75 BPM | HEIGHT: 72 IN | OXYGEN SATURATION: 98 % | WEIGHT: 159.8 LBS | DIASTOLIC BLOOD PRESSURE: 70 MMHG | SYSTOLIC BLOOD PRESSURE: 130 MMHG | BODY MASS INDEX: 21.64 KG/M2 | TEMPERATURE: 98.9 F

## 2019-06-03 DIAGNOSIS — E55.9 VITAMIN D DEFICIENCY: ICD-10-CM

## 2019-06-03 DIAGNOSIS — Q43.3 INTESTINAL MALROTATION: ICD-10-CM

## 2019-06-03 DIAGNOSIS — Z23 NEED FOR DIPHTHERIA-TETANUS-PERTUSSIS (TDAP) VACCINE: ICD-10-CM

## 2019-06-03 DIAGNOSIS — R71.8 LOW MEAN CORPUSCULAR VOLUME (MCV): ICD-10-CM

## 2019-06-03 DIAGNOSIS — R73.09 ELEVATED HEMOGLOBIN A1C: ICD-10-CM

## 2019-06-03 DIAGNOSIS — E78.2 MIXED HYPERLIPIDEMIA: ICD-10-CM

## 2019-06-03 DIAGNOSIS — E56.9 VITAMIN DEFICIENCY: ICD-10-CM

## 2019-06-03 DIAGNOSIS — Z12.11 SCREENING FOR COLON CANCER: Primary | ICD-10-CM

## 2019-06-03 DIAGNOSIS — K59.01 SLOW TRANSIT CONSTIPATION: ICD-10-CM

## 2019-06-03 PROCEDURE — 1160F RVW MEDS BY RX/DR IN RCRD: CPT | Performed by: INTERNAL MEDICINE

## 2019-06-03 PROCEDURE — 3008F BODY MASS INDEX DOCD: CPT | Performed by: INTERNAL MEDICINE

## 2019-06-03 PROCEDURE — 99214 OFFICE O/P EST MOD 30 MIN: CPT | Performed by: INTERNAL MEDICINE

## 2019-06-03 PROCEDURE — 1036F TOBACCO NON-USER: CPT | Performed by: INTERNAL MEDICINE

## 2019-06-03 RX ORDER — SENNOSIDES 8.6 MG
1 TABLET ORAL
Qty: 120 EACH | Refills: 0
Start: 2019-06-03 | End: 2022-05-28

## 2019-06-03 RX ORDER — POLYETHYLENE GLYCOL 3350 17 G/17G
17 POWDER, FOR SOLUTION ORAL DAILY PRN
Qty: 14 EACH | Refills: 0
Start: 2019-06-03

## 2019-06-03 RX ORDER — POLYETHYLENE GLYCOL 3350 17 G/17G
17 POWDER, FOR SOLUTION ORAL DAILY
Qty: 14 EACH | Refills: 0 | Status: SHIPPED | OUTPATIENT
Start: 2019-06-03 | End: 2019-06-03

## 2019-07-10 ENCOUNTER — DOCTOR'S OFFICE (OUTPATIENT)
Dept: URBAN - METROPOLITAN AREA CLINIC 137 | Facility: CLINIC | Age: 69
Setting detail: OPHTHALMOLOGY
End: 2019-07-10
Payer: COMMERCIAL

## 2019-07-10 DIAGNOSIS — H40.013: ICD-10-CM

## 2019-07-10 PROCEDURE — 99212 OFFICE O/P EST SF 10 MIN: CPT | Performed by: OPTOMETRIST

## 2019-07-10 ASSESSMENT — REFRACTION_MANIFEST
OS_AXIS: 090
OD_AXIS: 130
OD_VA1: 20/
OU_VA: 20/20
OD_SPHERE: +1.50
OS_SPHERE: +1.50
OU_VA: 20/
OS_VA1: 20/20
OD_CYLINDER: -0.50
OS_VA2: 20/
OS_VA2: 20/
OD_VA1: 20/20
OS_VA3: 20/
OS_VA3: 20/
OD_ADD: +2.25
OS_ADD: +2.25
OD_VA3: 20/
OD_VA3: 20/
OD_VA2: 20/
OS_CYLINDER: -0.50
OS_VA1: 20/
OD_VA2: 20/

## 2019-07-10 ASSESSMENT — REFRACTION_CURRENTRX
OD_SPHERE: +1.00
OS_SPHERE: +1.00
OS_OVR_VA: 20/
OD_VPRISM_DIRECTION: PROGS
OD_OVR_VA: 20/
OD_ADD: +2.25
OS_VPRISM_DIRECTION: PROGS
OS_ADD: +2.25
OD_CYLINDER: +0.25
OS_OVR_VA: 20/
OS_OVR_VA: 20/
OD_AXIS: 129
OD_OVR_VA: 20/
OS_AXIS: 002
OD_OVR_VA: 20/
OS_CYLINDER: +0.50

## 2019-07-10 ASSESSMENT — REFRACTION_AUTOREFRACTION
OS_SPHERE: +2.25
OD_SPHERE: +2.25
OS_AXIS: 88
OS_CYLINDER: -1.50
OD_AXIS: 38
OD_CYLINDER: -1.00

## 2019-07-10 ASSESSMENT — SPHEQUIV_DERIVED
OD_SPHEQUIV: 1.25
OS_SPHEQUIV: 1.5
OD_SPHEQUIV: 1.75
OS_SPHEQUIV: 1.25

## 2019-07-10 ASSESSMENT — CONFRONTATIONAL VISUAL FIELD TEST (CVF)
OS_FINDINGS: FULL
OD_FINDINGS: FULL

## 2019-07-10 ASSESSMENT — VISUAL ACUITY
OD_BCVA: 20/25+3
OS_BCVA: 20/25+3

## 2019-08-23 ENCOUNTER — TELEPHONE (OUTPATIENT)
Dept: GASTROENTEROLOGY | Facility: CLINIC | Age: 69
End: 2019-08-23

## 2019-08-23 NOTE — TELEPHONE ENCOUNTER
Called pt to see if he has ever had a colonoscopy  Advised if he has and has the results to bring them to appointment   lvm for him to call back

## 2019-08-27 ENCOUNTER — OFFICE VISIT (OUTPATIENT)
Dept: GASTROENTEROLOGY | Facility: AMBULARY SURGERY CENTER | Age: 69
End: 2019-08-27
Payer: COMMERCIAL

## 2019-08-27 VITALS
RESPIRATION RATE: 18 BRPM | TEMPERATURE: 97.6 F | HEIGHT: 72 IN | HEART RATE: 72 BPM | WEIGHT: 156.8 LBS | SYSTOLIC BLOOD PRESSURE: 120 MMHG | DIASTOLIC BLOOD PRESSURE: 70 MMHG | BODY MASS INDEX: 21.24 KG/M2

## 2019-08-27 DIAGNOSIS — Z12.11 SCREENING FOR COLON CANCER: ICD-10-CM

## 2019-08-27 PROCEDURE — 99204 OFFICE O/P NEW MOD 45 MIN: CPT | Performed by: INTERNAL MEDICINE

## 2019-08-27 NOTE — LETTER
August 27, 2019     Juan Miguel Soliz, [address] [facility]    Patient: Ericka Reed   YOB: 1950   Date of Visit: 8/27/2019       Dear Dr Desiree Watters:    Thank you for referring Ericka Reed to me for evaluation  Below are my notes for this consultation  If you have questions, please do not hesitate to call me  I look forward to following your patient along with you  Sincerely,        Malik Napoles MD        CC: No Recipients  Malik Napoles MD  8/27/2019  1:42 PM  Sign at close encounter  Consultation - [address] [facility]  Ericka Reed 70 y o  male MRN: [id number]  Unit/Bed#:  Encounter: [id number]        Consults    ASSESSMENT/PLAN:     1  Colon cancer screening-increased risk secondary to history of polyps  No other alarm symptoms   - Patient was explained about  the risks and benefits of the procedure  Risks including but not limited to bleeding, infection, perforation were explained in detail  Also explained about less than 100% sensitivity with the exam and other alternatives  -patient is not on any antiplatelets or anticoagulants       ______________________________________________________________________    Reason for Consult / Principal Problem: [unfilled]    HPI: Ericka Reed is a 70y o  year old male with history of intestinal malrotation, colon polyps, presents for colon cancer screening evaluation  Patient states that he underwent colonoscopy in 2015 and was recommended a follow-up at this time  His previous colonoscopy was notable for hyperplastic polyp  He states that he has a chronic constipation for which she takes Colace and MiraLax which seems to help with the symptoms  Denies any change in bowel habits  Denies hematochezia or abdominal pain  No unintentional weight loss  He denies any upper GI symptoms including heartburn, dysphagia, hematemesis, coffee-ground emesis or melena    Most recent labs were reviewed, LFTs are normal, hemoglobin is 14, platelets are normal, TSH is normal     Review of Systems: The remainder of the review of systems was negative except for the pertinent positives noted in HPI  Historical Information   Past Medical History:   Diagnosis Date    Constipation      Past Surgical History:   Procedure Laterality Date    ABDOMINAL SURGERY      BUNIONECTOMY      HERNIA REPAIR  2010    HERNIA REPAIR  2016    HERNIA REPAIR       Social History   Social History     Substance and Sexual Activity   Alcohol Use No     Social History     Substance and Sexual Activity   Drug Use No     Social History     Tobacco Use   Smoking Status Never Smoker   Smokeless Tobacco Never Used     Family History   Problem Relation Age of Onset    Hypertension Mother     Uterine cancer Sister        Meds/Allergies       (Not in a hospital admission)  No current facility-administered medications for this visit  No Known Allergies    Objective     Blood pressure 120/70, pulse 72, temperature 97 6 °F (36 4 °C), temperature source Tympanic, resp  rate 18, height 6' (1 829 m), weight 71 1 kg (156 lb 12 8 oz)  [unfilled]    PHYSICAL EXAM     GEN: well nourished, well developed, no acute distress  HEENT: anicteric, MMM, no cervical or supraclavicular lymphadenopathy  CV: RRR, no m/r/g  CHEST: CTA b/l, no WRR  ABD: +BS, soft, NT/ND, no hepatosplenomegaly  EXT: no c/c/e  SKIN: no rashes,  NEURO: aaox3    Lab Results:   No visits with results within 1 Day(s) from this visit     Latest known visit with results is:   Appointment on 05/28/2019   Component Date Value    Vit D, 25-Hydroxy 05/28/2019 25 4*    WBC 05/28/2019 5 33     RBC 05/28/2019 6 33*    Hemoglobin 05/28/2019 14 0     Hematocrit 05/28/2019 45 9     MCV 05/28/2019 73*    MCH 05/28/2019 22 1*    MCHC 05/28/2019 30 5*    RDW 05/28/2019 18 2*    Platelets 64/57/9177 263     MPV 05/28/2019 11 0     Sodium 05/28/2019 144     Potassium 05/28/2019 4 1  Chloride 05/28/2019 111*    CO2 05/28/2019 26     ANION GAP 05/28/2019 7     BUN 05/28/2019 19     Creatinine 05/28/2019 1 11     Glucose, Fasting 05/28/2019 91     Calcium 05/28/2019 8 8     AST 05/28/2019 13     ALT 05/28/2019 18     Alkaline Phosphatase 05/28/2019 58     Total Protein 05/28/2019 7 3     Albumin 05/28/2019 4 1     Total Bilirubin 05/28/2019 0 42     eGFR 05/28/2019 78     Cholesterol 05/28/2019 228*    Triglycerides 05/28/2019 62     HDL, Direct 05/28/2019 92*    LDL Calculated 05/28/2019 124*    Non-HDL-Chol (CHOL-HDL) 05/28/2019 136     Hemoglobin A1C 05/28/2019 5 8     EAG 05/28/2019 120     TSH 3RD GENERATON 05/28/2019 2 410      Imaging Studies: I have personally reviewed pertinent films in PACS

## 2019-08-27 NOTE — PROGRESS NOTES
Consultation - [address] [facility]  Haydee Billingsley 70 y o  male MRN: [id number]  Unit/Bed#:  Encounter: [id number]        Consults    ASSESSMENT/PLAN:     1  Colon cancer screening-increased risk secondary to history of polyps  No other alarm symptoms   - Patient was explained about  the risks and benefits of the procedure  Risks including but not limited to bleeding, infection, perforation were explained in detail  Also explained about less than 100% sensitivity with the exam and other alternatives  -patient is not on any antiplatelets or anticoagulants       ______________________________________________________________________    Reason for Consult / Principal Problem: [unfilled]    HPI: Haydee Billingsley is a 70y o  year old male with history of intestinal malrotation, colon polyps, presents for colon cancer screening evaluation  Patient states that he underwent colonoscopy in 2015 and was recommended a follow-up at this time  His previous colonoscopy was notable for hyperplastic polyp  He states that he has a chronic constipation for which she takes Colace and MiraLax which seems to help with the symptoms  Denies any change in bowel habits  Denies hematochezia or abdominal pain  No unintentional weight loss  He denies any upper GI symptoms including heartburn, dysphagia, hematemesis, coffee-ground emesis or melena  Most recent labs were reviewed, LFTs are normal, hemoglobin is 14, platelets are normal, TSH is normal     Review of Systems: The remainder of the review of systems was negative except for the pertinent positives noted in HPI       Historical Information   Past Medical History:   Diagnosis Date    Constipation      Past Surgical History:   Procedure Laterality Date    ABDOMINAL SURGERY      BUNIONECTOMY      HERNIA REPAIR  2010    HERNIA REPAIR  2016    HERNIA REPAIR       Social History   Social History     Substance and Sexual Activity   Alcohol Use No     Social History     Substance and Sexual Activity   Drug Use No     Social History     Tobacco Use   Smoking Status Never Smoker   Smokeless Tobacco Never Used     Family History   Problem Relation Age of Onset    Hypertension Mother     Uterine cancer Sister        Meds/Allergies       (Not in a hospital admission)  No current facility-administered medications for this visit  No Known Allergies    Objective     Blood pressure 120/70, pulse 72, temperature 97 6 °F (36 4 °C), temperature source Tympanic, resp  rate 18, height 6' (1 829 m), weight 71 1 kg (156 lb 12 8 oz)  [unfilled]    PHYSICAL EXAM     GEN: well nourished, well developed, no acute distress  HEENT: anicteric, MMM, no cervical or supraclavicular lymphadenopathy  CV: RRR, no m/r/g  CHEST: CTA b/l, no WRR  ABD: +BS, soft, NT/ND, no hepatosplenomegaly  EXT: no c/c/e  SKIN: no rashes,  NEURO: aaox3    Lab Results:   No visits with results within 1 Day(s) from this visit     Latest known visit with results is:   Appointment on 05/28/2019   Component Date Value    Vit D, 25-Hydroxy 05/28/2019 25 4*    WBC 05/28/2019 5 33     RBC 05/28/2019 6 33*    Hemoglobin 05/28/2019 14 0     Hematocrit 05/28/2019 45 9     MCV 05/28/2019 73*    MCH 05/28/2019 22 1*    MCHC 05/28/2019 30 5*    RDW 05/28/2019 18 2*    Platelets 63/82/5196 263     MPV 05/28/2019 11 0     Sodium 05/28/2019 144     Potassium 05/28/2019 4 1     Chloride 05/28/2019 111*    CO2 05/28/2019 26     ANION GAP 05/28/2019 7     BUN 05/28/2019 19     Creatinine 05/28/2019 1 11     Glucose, Fasting 05/28/2019 91     Calcium 05/28/2019 8 8     AST 05/28/2019 13     ALT 05/28/2019 18     Alkaline Phosphatase 05/28/2019 58     Total Protein 05/28/2019 7 3     Albumin 05/28/2019 4 1     Total Bilirubin 05/28/2019 0 42     eGFR 05/28/2019 78     Cholesterol 05/28/2019 228*    Triglycerides 05/28/2019 62     HDL, Direct 05/28/2019 92*    LDL Calculated 05/28/2019 124*    Non-HDL-Chol (CHOL-HDL) 05/28/2019 136     Hemoglobin A1C 05/28/2019 5 8     EAG 05/28/2019 120     TSH [address] [place] [address] 05/28/2019 2 410      Imaging Studies: I have personally reviewed pertinent films in PACS

## 2019-10-07 ENCOUNTER — APPOINTMENT (OUTPATIENT)
Dept: LAB | Facility: CLINIC | Age: 69
End: 2019-10-07
Payer: COMMERCIAL

## 2019-10-07 DIAGNOSIS — R71.8 LOW MEAN CORPUSCULAR VOLUME (MCV): ICD-10-CM

## 2019-10-07 DIAGNOSIS — R97.20 ELEVATED PSA: ICD-10-CM

## 2019-10-07 LAB
FERRITIN SERPL-MCNC: 118 NG/ML (ref 8–388)
IRON SATN MFR SERPL: 26 %
IRON SERPL-MCNC: 64 UG/DL (ref 65–175)
PSA SERPL-MCNC: 5.2 NG/ML (ref 0–4)
TIBC SERPL-MCNC: 247 UG/DL (ref 250–450)

## 2019-10-07 PROCEDURE — G0103 PSA SCREENING: HCPCS

## 2019-10-07 PROCEDURE — 36415 COLL VENOUS BLD VENIPUNCTURE: CPT

## 2019-10-07 PROCEDURE — 83540 ASSAY OF IRON: CPT

## 2019-10-07 PROCEDURE — 82728 ASSAY OF FERRITIN: CPT

## 2019-10-07 PROCEDURE — 83550 IRON BINDING TEST: CPT

## 2019-10-14 ENCOUNTER — ANESTHESIA EVENT (OUTPATIENT)
Dept: GASTROENTEROLOGY | Facility: AMBULARY SURGERY CENTER | Age: 69
End: 2019-10-14

## 2019-10-15 NOTE — PROGRESS NOTES
10/17/2019    Ilana Mar  1950  [id number]      Assessment  -Elevated PSA s/p negative prostate biopsy (4/2018)  -BPH with lower urinary tract symptoms    Discussion/Plan  Savita Simmons is a 78 y o  male being managed by Dr Pavel Stock        -We reviewed results of his recent PSA which is 5 2, previously 5 1 (3/18/19)  His PSA range is 2 6-9 03  He underwent negative prostate biopsy in April 2018 by prior urologist   We discussed that at this time, PSA remains within his baseline  Patient is also known to have >100 cc size prostate  We discussed MRI of prostate versus repeating prostate biopsy, however he wishes to monitor at this time  We discussed starting finasteride 5mg given his enlarged prostate and lower urinary tract symptoms      -Patient will return in 6 months for reassessment and PSA  -All questions answered, patient agrees with plan      History of Present Illness  78 y o  male with a history of elevated PSA and BPH with LUTS presents today for follow up  His last PSA 3/18/19 was 5 1  Patient underwent negative prostate biopsy by prior urologist in [place] in April 2018  His PSA range is 2 6-9 03  Patient was noted to have prostate size >110cc  He reports symptoms of urinary frequency and nocturia  Patient denies any gross hematuria or dysuria  He feels he is emptying his bladder to completion  Patient denies any strong family history of prostate cancer  No overall changes to his health  Review of Systems  Review of Systems   Constitutional: Negative  HENT: Negative  Respiratory: Negative  Cardiovascular: Negative  Gastrointestinal: Negative  Genitourinary: Positive for frequency  Negative for decreased urine volume, difficulty urinating, dysuria, flank pain, hematuria and urgency  Musculoskeletal: Negative  Skin: Negative  Neurological: Negative  Psychiatric/Behavioral: Negative        AUA SYMPTOM SCORE      Most Recent Value   AUA SYMPTOM SCORE   How often have you had a sensation of not emptying your bladder completely after you finished urinating? 2   How often have you had to urinate again less than two hours after you finished urinating? 3   How often have you found you stopped and started again several times when you urinate? 3   How often have you found it difficult to postpone urination? 4   How often have you had a weak urinary stream?  3   How often have you had to push or strain to begin urination? 3   How many times did you most typically get up to urinate from the time you went to bed at night until the time you got up in the morning?   2   Quality of Life: If you were to spend the rest of your life with your urinary condition just the way it is now, how would you feel about that?  2   AUA SYMPTOM SCORE  20          Past Medical History  Past Medical History:   Diagnosis Date    Constipation        Past Social History  Past Surgical History:   Procedure Laterality Date    ABDOMINAL SURGERY      BUNIONECTOMY      HERNIA REPAIR  2010    HERNIA REPAIR  2016    HERNIA REPAIR         Past Family History  Family History   Problem Relation Age of Onset    Hypertension Mother     Uterine cancer Sister        Past Social history  Social History     Socioeconomic History    Marital status: /Civil Union     Spouse name: Not on file    Number of children: Not on file    Years of education: Not on file    Highest education level: Not on file   Occupational History    Not on file   Social Needs    Financial resource strain: Not on file    Food insecurity:     Worry: Not on file     Inability: Not on file    Transportation needs:     Medical: Not on file     Non-medical: Not on file   Tobacco Use    Smoking status: Never Smoker    Smokeless tobacco: Never Used   Substance and Sexual Activity    Alcohol use: No    Drug use: No    Sexual activity: Not on file   Lifestyle    Physical activity:     Days per week: Not on file     Minutes per session: Not on file    Stress: Not on file   Relationships    Social connections:     Talks on phone: Not on file     Gets together: Not on file     Attends Sabianism service: Not on file     Active member of club or organization: Not on file     Attends meetings of clubs or organizations: Not on file     Relationship status: Not on file    Intimate partner violence:     Fear of current or ex partner: Not on file     Emotionally abused: Not on file     Physically abused: Not on file     Forced sexual activity: Not on file   Other Topics Concern    Not on file   Social History Narrative    Not on file       Current Medications  Current Outpatient Medications   Medication Sig Dispense Refill    Cyanocobalamin (VITAMIN B-12) 2500 MCG SUBL Place 1 tablet under the tongue daily      docusate sodium (COLACE) 100 mg capsule 1 capsule daily   Multiple Vitamin (MULTIVITAMIN) tablet Take 1 tablet by mouth daily      Omega-3 1000 MG CAPS 1 capsule daily   polyethylene glycol (MIRALAX) 17 g packet Take 17 g by mouth daily as needed (Constipation) 14 each 0    Probiotic Product (PROBIOTIC-10 PO) 1 tablet daily   senna (SENOKOT) 8 6 mg Take 1 tablet (8 6 mg total) by mouth daily at bedtime as needed for constipation 120 each 0     No current facility-administered medications for this visit  Allergies  No Known Allergies    Past Medical History, Social History, Family History, medications and allergies were reviewed  Vitals  There were no vitals filed for this visit  Physical Exam  Physical Exam   Constitutional: He is oriented to person, place, and time  He appears well-developed and well-nourished  HENT:   Head: Normocephalic  Eyes: Pupils are equal, round, and reactive to light  Neck: Normal range of motion  Cardiovascular: Normal rate and regular rhythm  Pulmonary/Chest: Effort normal    Abdominal: Soft  Normal appearance  There is no CVA tenderness     Genitourinary: Rectum normal and prostate normal    Genitourinary Comments: Prostate >70gm, smooth, nontender   Musculoskeletal: Normal range of motion  Neurological: He is alert and oriented to person, place, and time  Skin: Skin is warm and dry  Psychiatric: He has a normal mood and affect  His behavior is normal  Judgment and thought content normal        Results    I have personally reviewed all pertinent lab results and reviewed with patient  Lab Results   Component Value Date    PSA 5 2 (H) 10/07/2019    PSA 5 1 (H) 03/18/2019     Lab Results   Component Value Date    CALCIUM 8 8 05/28/2019    K 4 1 05/28/2019    CO2 26 05/28/2019     (H) 05/28/2019    BUN 19 05/28/2019    CREATININE 1 11 05/28/2019     Lab Results   Component Value Date    WBC 5 33 05/28/2019    HGB 14 0 05/28/2019    HCT 45 9 05/28/2019    MCV 73 (L) 05/28/2019     05/28/2019     No results found for this or any previous visit (from the past 1 hour(s))

## 2019-10-17 ENCOUNTER — OFFICE VISIT (OUTPATIENT)
Dept: UROLOGY | Facility: AMBULATORY SURGERY CENTER | Age: 69
End: 2019-10-17
Payer: COMMERCIAL

## 2019-10-17 VITALS
SYSTOLIC BLOOD PRESSURE: 120 MMHG | WEIGHT: 157 LBS | HEIGHT: 73 IN | BODY MASS INDEX: 20.81 KG/M2 | HEART RATE: 75 BPM | DIASTOLIC BLOOD PRESSURE: 72 MMHG

## 2019-10-17 DIAGNOSIS — R35.0 BENIGN PROSTATIC HYPERPLASIA WITH URINARY FREQUENCY: ICD-10-CM

## 2019-10-17 DIAGNOSIS — N40.1 BENIGN PROSTATIC HYPERPLASIA WITH URINARY FREQUENCY: ICD-10-CM

## 2019-10-17 DIAGNOSIS — N52.9 ERECTILE DYSFUNCTION, UNSPECIFIED ERECTILE DYSFUNCTION TYPE: ICD-10-CM

## 2019-10-17 DIAGNOSIS — R97.20 ELEVATED PSA: Primary | ICD-10-CM

## 2019-10-17 PROCEDURE — 99214 OFFICE O/P EST MOD 30 MIN: CPT | Performed by: NURSE PRACTITIONER

## 2019-10-17 RX ORDER — SILDENAFIL CITRATE 20 MG/1
20 TABLET ORAL AS NEEDED
Qty: 30 TABLET | Refills: 1 | Status: SHIPPED | OUTPATIENT
Start: 2019-10-17 | End: 2019-11-06 | Stop reason: SDUPTHER

## 2019-10-17 RX ORDER — FINASTERIDE 5 MG/1
5 TABLET, FILM COATED ORAL DAILY
Qty: 30 TABLET | Refills: 6 | Status: SHIPPED | OUTPATIENT
Start: 2019-10-17 | End: 2020-04-24 | Stop reason: SDUPTHER

## 2019-10-18 ENCOUNTER — DOCTOR'S OFFICE (OUTPATIENT)
Dept: URBAN - METROPOLITAN AREA CLINIC 137 | Facility: CLINIC | Age: 69
Setting detail: OPHTHALMOLOGY
End: 2019-10-18
Payer: COMMERCIAL

## 2019-10-18 DIAGNOSIS — H40.013: ICD-10-CM

## 2019-10-18 PROCEDURE — 99212 OFFICE O/P EST SF 10 MIN: CPT | Performed by: OPTOMETRIST

## 2019-10-18 PROCEDURE — 92133 CPTRZD OPH DX IMG PST SGM ON: CPT | Performed by: OPTOMETRIST

## 2019-10-18 ASSESSMENT — REFRACTION_CURRENTRX
OD_AXIS: 129
OD_CYLINDER: +0.25
OS_OVR_VA: 20/
OS_AXIS: 002
OS_SPHERE: +1.00
OD_ADD: +2.25
OD_OVR_VA: 20/
OS_CYLINDER: +0.50
OD_OVR_VA: 20/
OS_ADD: +2.25
OD_OVR_VA: 20/
OS_OVR_VA: 20/
OS_OVR_VA: 20/
OS_VPRISM_DIRECTION: PROGS
OD_VPRISM_DIRECTION: PROGS
OD_SPHERE: +1.00

## 2019-10-18 ASSESSMENT — REFRACTION_MANIFEST
OS_VA2: 20/
OD_AXIS: 130
OD_SPHERE: +1.50
OU_VA: 20/
OS_VA3: 20/
OS_VA2: 20/
OD_VA3: 20/
OS_ADD: +2.25
OD_VA3: 20/
OD_VA2: 20/
OD_VA2: 20/
OD_CYLINDER: -0.50
OD_VA1: 20/20
OS_VA1: 20/
OU_VA: 20/20
OS_CYLINDER: -0.50
OS_VA1: 20/20
OS_SPHERE: +1.50
OD_ADD: +2.25
OS_VA3: 20/
OS_AXIS: 090
OD_VA1: 20/

## 2019-10-18 ASSESSMENT — REFRACTION_AUTOREFRACTION
OS_CYLINDER: -1.50
OS_SPHERE: +2.25
OD_CYLINDER: -1.00
OS_AXIS: 88
OD_AXIS: 38
OD_SPHERE: +2.25

## 2019-10-18 ASSESSMENT — CONFRONTATIONAL VISUAL FIELD TEST (CVF)
OS_FINDINGS: FULL
OD_FINDINGS: FULL

## 2019-10-18 ASSESSMENT — SPHEQUIV_DERIVED
OS_SPHEQUIV: 1.25
OS_SPHEQUIV: 1.5
OD_SPHEQUIV: 1.75
OD_SPHEQUIV: 1.25

## 2019-10-18 ASSESSMENT — VISUAL ACUITY
OD_BCVA: 20/25
OS_BCVA: 20/20-2

## 2019-10-28 ENCOUNTER — ANESTHESIA (OUTPATIENT)
Dept: GASTROENTEROLOGY | Facility: AMBULARY SURGERY CENTER | Age: 69
End: 2019-10-28

## 2019-10-28 ENCOUNTER — HOSPITAL ENCOUNTER (OUTPATIENT)
Dept: GASTROENTEROLOGY | Facility: AMBULARY SURGERY CENTER | Age: 69
Setting detail: OUTPATIENT SURGERY
Discharge: HOME | End: 2019-10-28
Attending: INTERNAL MEDICINE | Admitting: INTERNAL MEDICINE
Payer: COMMERCIAL

## 2019-10-28 VITALS
HEART RATE: 73 BPM | SYSTOLIC BLOOD PRESSURE: 109 MMHG | HEIGHT: 72 IN | OXYGEN SATURATION: 100 % | WEIGHT: 155 LBS | DIASTOLIC BLOOD PRESSURE: 79 MMHG | TEMPERATURE: 97.1 F | BODY MASS INDEX: 20.99 KG/M2 | RESPIRATION RATE: 18 BRPM

## 2019-10-28 DIAGNOSIS — Z12.11 SCREENING FOR COLON CANCER: ICD-10-CM

## 2019-10-28 PROCEDURE — 88305 TISSUE EXAM BY PATHOLOGIST: CPT | Performed by: PATHOLOGY

## 2019-10-28 PROCEDURE — 45380 COLONOSCOPY AND BIOPSY: CPT | Performed by: INTERNAL MEDICINE

## 2019-10-28 PROCEDURE — 45385 COLONOSCOPY W/LESION REMOVAL: CPT | Performed by: INTERNAL MEDICINE

## 2019-10-28 RX ORDER — GLYCOPYRROLATE 0.2 MG/ML
INJECTION INTRAMUSCULAR; INTRAVENOUS AS NEEDED
Status: DISCONTINUED | OUTPATIENT
Start: 2019-10-28 | End: 2019-10-28 | Stop reason: SURG

## 2019-10-28 RX ORDER — SODIUM CHLORIDE, SODIUM LACTATE, POTASSIUM CHLORIDE, CALCIUM CHLORIDE 600; 310; 30; 20 MG/100ML; MG/100ML; MG/100ML; MG/100ML
INJECTION, SOLUTION INTRAVENOUS CONTINUOUS PRN
Status: DISCONTINUED | OUTPATIENT
Start: 2019-10-28 | End: 2019-10-28 | Stop reason: SURG

## 2019-10-28 RX ORDER — PROPOFOL 10 MG/ML
INJECTION, EMULSION INTRAVENOUS AS NEEDED
Status: DISCONTINUED | OUTPATIENT
Start: 2019-10-28 | End: 2019-10-28 | Stop reason: SURG

## 2019-10-28 RX ORDER — LIDOCAINE HYDROCHLORIDE 10 MG/ML
0.5 INJECTION, SOLUTION EPIDURAL; INFILTRATION; INTRACAUDAL; PERINEURAL ONCE AS NEEDED
Status: DISCONTINUED | OUTPATIENT
Start: 2019-10-28 | End: 2019-11-01 | Stop reason: HOSPADM

## 2019-10-28 RX ORDER — SODIUM CHLORIDE 9 MG/ML
125 INJECTION, SOLUTION INTRAVENOUS CONTINUOUS
Status: DISCONTINUED | OUTPATIENT
Start: 2019-10-28 | End: 2019-11-01 | Stop reason: HOSPADM

## 2019-10-28 RX ORDER — PROPOFOL 10 MG/ML
INJECTION, EMULSION INTRAVENOUS CONTINUOUS PRN
Status: DISCONTINUED | OUTPATIENT
Start: 2019-10-28 | End: 2019-10-28 | Stop reason: SURG

## 2019-10-28 RX ADMIN — PHENYLEPHRINE HYDROCHLORIDE 100 MCG: 10 INJECTION INTRAVENOUS at 10:12

## 2019-10-28 RX ADMIN — PROPOFOL 50 MG: 10 INJECTION, EMULSION INTRAVENOUS at 09:51

## 2019-10-28 RX ADMIN — PROPOFOL 50 MG: 10 INJECTION, EMULSION INTRAVENOUS at 09:58

## 2019-10-28 RX ADMIN — SODIUM CHLORIDE, SODIUM LACTATE, POTASSIUM CHLORIDE, AND CALCIUM CHLORIDE: .6; .31; .03; .02 INJECTION, SOLUTION INTRAVENOUS at 08:59

## 2019-10-28 RX ADMIN — GLYCOPYRROLATE 0.2 MG: 0.2 INJECTION, SOLUTION INTRAMUSCULAR; INTRAVENOUS at 10:11

## 2019-10-28 RX ADMIN — Medication 40 MG: at 10:16

## 2019-10-28 RX ADMIN — PROPOFOL 50 MG: 10 INJECTION, EMULSION INTRAVENOUS at 09:53

## 2019-10-28 RX ADMIN — PROPOFOL 150 MCG/KG/MIN: 10 INJECTION, EMULSION INTRAVENOUS at 09:53

## 2019-10-28 RX ADMIN — PHENYLEPHRINE HYDROCHLORIDE 100 MCG: 10 INJECTION INTRAVENOUS at 10:26

## 2019-10-28 NOTE — H&P
History and Physical -  [facility]  Mao Carter 78 y o  male MRN: [id number]    HPI: Mao Carter is a 78y o  year old male who presents for colon cancer screening  Review of Systems    Historical Information   Past Medical History:   Diagnosis Date    Constipation      Past Surgical History:   Procedure Laterality Date    ABDOMINAL SURGERY      BUNIONECTOMY      HERNIA REPAIR  2010    HERNIA REPAIR  2016    HERNIA REPAIR       Social History   Social History     Substance and Sexual Activity   Alcohol Use No     Social History     Substance and Sexual Activity   Drug Use No     Social History     Tobacco Use   Smoking Status Never Smoker   Smokeless Tobacco Never Used     Family History   Problem Relation Age of Onset    Hypertension Mother     Uterine cancer Sister        Meds/Allergies       (Not in a hospital admission)    No Known Allergies    Objective     /77   Pulse 66   Temp (!) 97 4 °F (36 3 °C) (Temporal)   Resp 16   Ht 6' (1 829 m)   Wt 70 3 kg (155 lb)   SpO2 99%   BMI 21 02 kg/m²       PHYSICAL EXAM    Gen: NAD  CV: RRR  CHEST: Clear  ABD: soft, NT/ND  EXT: no edema  Neuro: AAO      ASSESSMENT/PLAN:  This is a 78y o  year old male here for colon cancer screening  PLAN:   Procedure:  Colonoscopy

## 2019-10-28 NOTE — ANESTHESIA PREPROCEDURE EVALUATION
Review of Systems/Medical History  Patient summary reviewed    No history of anesthetic complications     Cardiovascular  Hyperlipidemia,    Pulmonary  Negative pulmonary ROS        GI/Hepatic    Bowel prep       Prostatic disorder, benign prostatic hyperplasia       Endo/Other     GYN       Hematology  Negative hematology ROS      Musculoskeletal  Negative musculoskeletal ROS        Neurology  Negative neurology ROS      Psychology   Negative psychology ROS              Physical Exam    Airway    Mallampati score: I  TM Distance: >3 FB  Neck ROM: full     Dental   No notable dental hx     Cardiovascular      Pulmonary      Other Findings        Anesthesia Plan  ASA Score- 2     Anesthesia Type- IV sedation with anesthesia with ASA Monitors  Additional Monitors:   Airway Plan:         Plan Factors-  Patient did not smoke on day of surgery  Induction-     Postoperative Plan-     Informed Consent- Anesthetic plan and risks discussed with patient  I personally reviewed this patient with the CRNA  Discussed and agreed on the Anesthesia Plan with the CRNA  Christiano Ames

## 2019-10-28 NOTE — ANESTHESIA POSTPROCEDURE EVALUATION
Post-Op Assessment Note    CV Status:  Stable  Pain Score: 0    Pain management: adequate     Mental Status:  Alert and awake   Hydration Status:  Euvolemic   PONV Controlled:  None   Airway Patency:  Patent   Post Op Vitals Reviewed: Yes      Staff: Anesthesiologist           /79 (10/28/19 1100)    Temp     Pulse 73 (10/28/19 1100)   Resp 18 (10/28/19 1100)    SpO2 100 % (10/28/19 1100)

## 2019-10-30 ENCOUNTER — TELEPHONE (OUTPATIENT)
Dept: INTERNAL MEDICINE CLINIC | Facility: CLINIC | Age: 69
End: 2019-10-30

## 2019-11-01 DIAGNOSIS — N52.9 ERECTILE DYSFUNCTION, UNSPECIFIED ERECTILE DYSFUNCTION TYPE: ICD-10-CM

## 2019-11-01 NOTE — TELEPHONE ENCOUNTER
Patient sees Dr Aidan Morgan in the [place] office  Patient would like a call back regarding a question he has about his  Finasteride medication   Please advise

## 2019-11-01 NOTE — TELEPHONE ENCOUNTER
Patient of Garcia/Enrico  History elevated PSA and BPH with LUTS  Last seen 10/17/19 with Sherice Gonzalez  Patient was ordered for finasteride at that time  He states that he does not want to take this as he believes that it is keeping him from getting an erection  He does not think that he needs it, as his urinary symptoms are not really bothering him  (Of note, he was also ordered sildenafil at that time but he has not purchased at this time, as they are too expensive )    I advised patient that I would let the provider know of his decision not to take the finasteride

## 2019-11-06 RX ORDER — SILDENAFIL CITRATE 20 MG/1
20 TABLET ORAL AS NEEDED
Qty: 30 TABLET | Refills: 1 | Status: SHIPPED | OUTPATIENT
Start: 2019-11-06 | End: 2020-04-24 | Stop reason: ALTCHOICE

## 2019-11-06 NOTE — TELEPHONE ENCOUNTER
Pt called today stating he will continue the [address] he would like the script for Sildenafil sent to [facility]

## 2019-11-12 ENCOUNTER — TELEPHONE (OUTPATIENT)
Dept: GASTROENTEROLOGY | Facility: CLINIC | Age: 69
End: 2019-11-12

## 2019-11-12 NOTE — LETTER
November 12, 2019     [facility] [address] [address]      Dear Mr Pettit:            Our office has attempted to call you in regards to your results, however, we have been unable to get a hold of you  Please give our office a call so we can review your results and answer any questions you may have in regards to your recent Colonoscopy Biopsies                 Sincerely,        [facility]

## 2019-11-12 NOTE — TELEPHONE ENCOUNTER
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

## 2019-11-26 ENCOUNTER — APPOINTMENT (OUTPATIENT)
Dept: LAB | Facility: CLINIC | Age: 69
End: 2019-11-26
Payer: COMMERCIAL

## 2019-11-26 ENCOUNTER — TRANSCRIBE ORDERS (OUTPATIENT)
Dept: LAB | Facility: CLINIC | Age: 69
End: 2019-11-26

## 2019-11-26 DIAGNOSIS — R71.8 OTHER ABNORMALITY OF RED BLOOD CELLS: ICD-10-CM

## 2019-11-26 DIAGNOSIS — R71.8 OTHER ABNORMALITY OF RED BLOOD CELLS: Primary | ICD-10-CM

## 2019-11-26 LAB
FERRITIN SERPL-MCNC: 112 NG/ML (ref 8–388)
IRON SATN MFR SERPL: 28 %
IRON SERPL-MCNC: 68 UG/DL (ref 65–175)
TIBC SERPL-MCNC: 245 UG/DL (ref 250–450)

## 2019-11-26 PROCEDURE — 36415 COLL VENOUS BLD VENIPUNCTURE: CPT

## 2019-11-26 PROCEDURE — 82728 ASSAY OF FERRITIN: CPT

## 2019-11-26 PROCEDURE — 83540 ASSAY OF IRON: CPT

## 2019-11-26 PROCEDURE — 83550 IRON BINDING TEST: CPT

## 2019-12-09 ENCOUNTER — OFFICE VISIT (OUTPATIENT)
Dept: INTERNAL MEDICINE CLINIC | Facility: CLINIC | Age: 69
End: 2019-12-09
Payer: COMMERCIAL

## 2019-12-09 VITALS
HEART RATE: 76 BPM | HEIGHT: 73 IN | DIASTOLIC BLOOD PRESSURE: 78 MMHG | WEIGHT: 160 LBS | TEMPERATURE: 98.4 F | BODY MASS INDEX: 21.2 KG/M2 | SYSTOLIC BLOOD PRESSURE: 120 MMHG | OXYGEN SATURATION: 100 %

## 2019-12-09 DIAGNOSIS — R97.20 ABNORMAL PROSTATE SPECIFIC ANTIGEN (PSA): ICD-10-CM

## 2019-12-09 DIAGNOSIS — R73.01 ABNORMAL FASTING GLUCOSE: ICD-10-CM

## 2019-12-09 DIAGNOSIS — R71.8 LOW MEAN CORPUSCULAR VOLUME (MCV): ICD-10-CM

## 2019-12-09 DIAGNOSIS — Z13.1 SCREENING FOR DIABETES MELLITUS: ICD-10-CM

## 2019-12-09 DIAGNOSIS — Z00.00 MEDICARE ANNUAL WELLNESS VISIT, INITIAL: ICD-10-CM

## 2019-12-09 DIAGNOSIS — Z13.6 SCREENING FOR CARDIOVASCULAR CONDITION: ICD-10-CM

## 2019-12-09 DIAGNOSIS — E78.2 MIXED HYPERLIPIDEMIA: Primary | ICD-10-CM

## 2019-12-09 DIAGNOSIS — E55.9 VITAMIN D DEFICIENCY: ICD-10-CM

## 2019-12-09 PROBLEM — R73.09 ELEVATED HEMOGLOBIN A1C: Status: RESOLVED | Noted: 2019-01-09 | Resolved: 2019-12-09

## 2019-12-09 PROBLEM — E56.9 VITAMIN DEFICIENCY: Status: RESOLVED | Noted: 2019-01-09 | Resolved: 2019-12-09

## 2019-12-09 PROCEDURE — 3008F BODY MASS INDEX DOCD: CPT | Performed by: INTERNAL MEDICINE

## 2019-12-09 PROCEDURE — G0438 PPPS, INITIAL VISIT: HCPCS | Performed by: INTERNAL MEDICINE

## 2019-12-09 PROCEDURE — 3725F SCREEN DEPRESSION PERFORMED: CPT | Performed by: INTERNAL MEDICINE

## 2019-12-09 PROCEDURE — 1160F RVW MEDS BY RX/DR IN RCRD: CPT | Performed by: INTERNAL MEDICINE

## 2019-12-09 PROCEDURE — 1036F TOBACCO NON-USER: CPT | Performed by: INTERNAL MEDICINE

## 2019-12-09 PROCEDURE — 99214 OFFICE O/P EST MOD 30 MIN: CPT | Performed by: INTERNAL MEDICINE

## 2019-12-09 PROCEDURE — 1125F AMNT PAIN NOTED PAIN PRSNT: CPT | Performed by: INTERNAL MEDICINE

## 2019-12-09 PROCEDURE — 1170F FXNL STATUS ASSESSED: CPT | Performed by: INTERNAL MEDICINE

## 2019-12-09 NOTE — PROGRESS NOTES
Assessment and Plan:     Problem List Items Addressed This Visit     None           Preventive health issues were discussed with patient, and age appropriate screening tests were ordered as noted in patient's After Visit Summary  Personalized health advice and appropriate referrals for health education or preventive services given if needed, as noted in patient's After Visit Summary  History of Present Illness:     Patient presents for Medicare Annual Wellness visit    Patient Care Team:  Angelo Lorenzo MD as PCP - General (Internal Medicine)     Problem List:     Patient Active Problem List   Diagnosis    Urinary retention    Abnormal prostate specific antigen (PSA)    Hyperlipidemia    Inguinal hernia    Vitamin deficiency    Intestinal malrotation    Annual physical exam    Immunization due    Encounter for colorectal cancer screening    Elevated hemoglobin A1c    Vitamin D deficiency    Low mean corpuscular volume (MCV)      Past Medical and Surgical History:     Past Medical History:   Diagnosis Date    Constipation      Past Surgical History:   Procedure Laterality Date    ABDOMINAL SURGERY      BUNIONECTOMY      HERNIA REPAIR  2010    HERNIA REPAIR  2016    HERNIA REPAIR        Family History:     Family History   Problem Relation Age of Onset    Hypertension Mother     Uterine cancer Sister       Social History:     Social History     Socioeconomic History    Marital status: /Civil Union     Spouse name: None    Number of children: None    Years of education: None    Highest education level: None   Occupational History    None   Social Needs    Financial resource strain: None    Food insecurity:     Worry: None     Inability: None    Transportation needs:     Medical: None     Non-medical: None   Tobacco Use    Smoking status: Never Smoker    Smokeless tobacco: Never Used   Substance and Sexual Activity    Alcohol use: Yes     Comment: Wine rarely    Drug use:  No  Sexual activity: Yes   Lifestyle    Physical activity:     Days per week: None     Minutes per session: None    Stress: None   Relationships    Social connections:     Talks on phone: None     Gets together: None     Attends Holiness service: None     Active member of club or organization: None     Attends meetings of clubs or organizations: None     Relationship status: None    Intimate partner violence:     Fear of current or ex partner: None     Emotionally abused: None     Physically abused: None     Forced sexual activity: None   Other Topics Concern    None   Social History Narrative    None       Medications and Allergies:     Current Outpatient Medications   Medication Sig Dispense Refill    Cyanocobalamin (VITAMIN B-12) 2500 MCG SUBL Place 1 tablet under the tongue daily      docusate sodium (COLACE) 100 mg capsule 1 capsule daily   finasteride (PROSCAR) 5 mg tablet Take 1 tablet (5 mg total) by mouth daily 30 tablet 6    Multiple Vitamin (MULTIVITAMIN) tablet Take 1 tablet by mouth daily      Omega-3 1000 MG CAPS 1 capsule daily   polyethylene glycol (MIRALAX) 17 g packet Take 17 g by mouth daily as needed (Constipation) 14 each 0    Probiotic Product (PROBIOTIC-10 PO) 1 tablet daily   senna (SENOKOT) 8 6 mg Take 1 tablet (8 6 mg total) by mouth daily at bedtime as needed for constipation 120 each 0    sildenafil (REVATIO) 20 mg tablet Take 1 tablet (20 mg total) by mouth as needed (PRN) Take 1-5 tablets as needed 30 tablet 1     No current facility-administered medications for this visit        No Known Allergies   Immunizations:     Immunization History   Administered Date(s) Administered    INFLUENZA 11/01/2015, 11/29/2017, 10/03/2018    Influenza Injectable, MDCK, Preservative Free, Quadrivalent, 0 5 mL 11/29/2017    Influenza Split High Dose Preservative Free IM 09/19/2019    Pneumococcal Conjugate 13-Valent 01/09/2019    Zoster Vaccine Recombinant 08/12/2019 Health Maintenance:         Topic Date Due    CRC Screening: Colonoscopy  10/28/2022    Hepatitis C Screening  Completed         Topic Date Due    DTaP,Tdap,and Td Vaccines (1 - Tdap) 09/16/1961      Medicare Health Risk Assessment:     /78 (BP Location: Left arm, Patient Position: Sitting, Cuff Size: Standard)   Pulse 76   Temp 98 4 °F (36 9 °C) (Oral)   Ht 6' 1" (1 854 m)   Wt 72 6 kg (160 lb)   SpO2 100%   BMI 21 11 kg/m²      Savita Simmons is here for his Initial Wellness visit  Health Risk Assessment:   Patient rates overall health as very good  Patient feels that their physical health rating is slightly better  Eyesight was rated as same  Hearing was rated as same  Patient feels that their emotional and mental health rating is slightly better  Pain experienced in the last 7 days has been some  Patient's pain rating has been 1/10  Depression Screening:   PHQ-2 Score: 0      Fall Risk Screening: In the past year, patient has experienced: no history of falling in past year      Home Safety:  Patient does not have trouble with stairs inside or outside of their home  Patient has working smoke alarms Home safety hazards include: none  Nutrition:   Current diet is Regular, Low Cholesterol and Limited junk food  Medications:   Patient is currently taking over-the-counter supplements  OTC medications include: see medication list  Patient is able to manage medications  Activities of Daily Living (ADLs)/Instrumental Activities of Daily Living (IADLs):   Walk and transfer into and out of bed and chair?: Yes  Dress and groom yourself?: Yes    Bathe or shower yourself?: Yes    Feed yourself? Yes  Do your laundry/housekeeping?: Yes  Manage your money, pay your bills and track your expenses?: Yes  Make your own meals?: Yes    Do your own shopping?: Yes    Previous Hospitalizations:   Any hospitalizations or ED visits within the last 12 months?: No      Advance Care Planning:   Living will:  Yes Durable POA for healthcare: No    Advanced directive: Yes    End of Life Decisions reviewed with patient: Yes    Provider agrees with end of life decisions: Yes      Comments: Discussed advanced directive with the patient, living will reviewed as well  He would like to be listed as FULL CODE  Cognitive Screening:   Provider or family/friend/caregiver concerned regarding cognition?: No    PREVENTIVE SCREENINGS      Cardiovascular Screening:    General: Screening Not Indicated, History Lipid Disorder, Risks and Benefits Discussed and Screening Current      Diabetes Screening:     General: Screening Current, Risks and Benefits Discussed and Screening Not Indicated      Colorectal Cancer Screening:     General: Screening Current and Risks and Benefits Discussed      Prostate Cancer Screening:    General: Screening Current and Risks and Benefits Discussed      Osteoporosis Screening:    General: Screening Not Indicated      Abdominal Aortic Aneurysm (AAA) Screening:    Risk factors include: age between 73-69 yo        Lung Cancer Screening:     General: Screening Not Indicated      Hepatitis C Screening:    General: Screening Current    Other Counseling Topics:   Alcohol use counseling, car/seat belt/driving safety, skin self-exam, sunscreen and calcium and vitamin D intake and regular weightbearing exercise         Lillian Franks MD

## 2019-12-09 NOTE — ASSESSMENT & PLAN NOTE
He would like to defer on statin therapy continue with Omega 3 supplementation  Will recheck lipid panel in 6 months

## 2019-12-09 NOTE — PATIENT INSTRUCTIONS
Medicare Preventive Visit Patient Instructions  Thank you for completing your Welcome to Medicare Visit or Medicare Annual Wellness Visit today  Your next wellness visit will be due in one year (12/9/2020)  The screening/preventive services that you may require over the next 5-10 years are detailed below  Some tests may not apply to you based off risk factors and/or age  Screening tests ordered at today's visit but not completed yet may show as past due  Also, please note that scanned in results may not display below  Preventive Screenings:  Service Recommendations Previous Testing/Comments   Colorectal Cancer Screening  · Colonoscopy    · Fecal Occult Blood Test (FOBT)/Fecal Immunochemical Test (FIT)  · Fecal DNA/Cologuard Test  · Flexible Sigmoidoscopy Age: 54-65 years old   Colonoscopy: every 10 years (May be performed more frequently if at higher risk)  OR  FOBT/FIT: every 1 year  OR  Cologuard: every 3 years  OR  Sigmoidoscopy: every 5 years  Screening may be recommended earlier than age 48 if at higher risk for colorectal cancer  Also, an individualized decision between you and your healthcare provider will decide whether screening between the ages of 74-80 would be appropriate   Colonoscopy: 10/28/2019  FOBT/FIT: Not on file  Cologuard: Not on file  Sigmoidoscopy: Not on file    Screening Current     Prostate Cancer Screening Individualized decision between patient and health care provider in men between ages of 53-78   Medicare will cover every 12 months beginning on the day after your 50th birthday PSA: 5 2 ng/mL     Screening Current     Hepatitis C Screening Once for adults born between 1945 and 1965  More frequently in patients at high risk for Hepatitis C Hep C Antibody: 03/30/2016    Screening Current   Diabetes Screening 1-2 times per year if you're at risk for diabetes or have pre-diabetes Fasting glucose: 91 mg/dL   A1C: 5 8 %    Screening Current   Cholesterol Screening Once every 5 years if you don't have a lipid disorder  May order more often based on risk factors  Lipid panel: 05/28/2019    Screening Not Indicated  History Lipid Disorder      Other Preventive Screenings Covered by Medicare:  1  Abdominal Aortic Aneurysm (AAA) Screening: covered once if your at risk  You're considered to be at risk if you have a family history of AAA or a male between the age of 73-68 who smoking at least 100 cigarettes in your lifetime  2  Lung Cancer Screening: covers low dose CT scan once per year if you meet all of the following conditions: (1) Age 50-69; (2) No signs or symptoms of lung cancer; (3) Current smoker or have quit smoking within the last 15 years; (4) You have a tobacco smoking history of at least 30 pack years (packs per day x number of years you smoked); (5) You get a written order from a healthcare provider  3  Glaucoma Screening: covered annually if you're considered high risk: (1) You have diabetes OR (2) Family history of glaucoma OR (3)  aged 48 and older OR (3)  American aged 72 and older  3  Osteoporosis Screening: covered every 2 years if you meet one of the following conditions: (1) Have a vertebral abnormality; (2) On glucocorticoid therapy for more than 3 months; (3) Have primary hyperparathyroidism; (4) On osteoporosis medications and need to assess response to drug therapy  5  HIV Screening: covered annually if you're between the age of 12-76  Also covered annually if you are younger than 13 and older than 72 with risk factors for HIV infection  For pregnant patients, it is covered up to 3 times per pregnancy      Immunizations:  Immunization Recommendations   Influenza Vaccine Annual influenza vaccination during flu season is recommended for all persons aged >= 6 months who do not have contraindications   Pneumococcal Vaccine (Prevnar and Pneumovax)  * Prevnar = PCV13  * Pneumovax = PPSV23 Adults 25-60 years old: 1-3 doses may be recommended based on certain risk factors  Adults 72 years old: Prevnar (PCV13) vaccine recommended followed by Pneumovax (PPSV23) vaccine  If already received PPSV23 since turning 65, then PCV13 recommended at least one year after PPSV23 dose  Hepatitis B Vaccine 3 dose series if at intermediate or high risk (ex: diabetes, end stage renal disease, liver disease)   Tetanus (Td) Vaccine - COST NOT COVERED BY MEDICARE PART B Following completion of primary series, a booster dose should be given every 10 years to maintain immunity against tetanus  Td may also be given as tetanus wound prophylaxis  Tdap Vaccine - COST NOT COVERED BY MEDICARE PART B Recommended at least once for all adults  For pregnant patients, recommended with each pregnancy  Shingles Vaccine (Shingrix) - COST NOT COVERED BY MEDICARE PART B  2 shot series recommended in those aged 48 and above     Health Maintenance Due:      Topic Date Due    CRC Screening: Colonoscopy  10/28/2022    Hepatitis C Screening  Completed     Immunizations Due:      Topic Date Due    DTaP,Tdap,and Td Vaccines (1 - Tdap) 09/16/1961     Advance Directives   What are advance directives? Advance directives are legal documents that state your wishes and plans for medical care  These plans are made ahead of time in case you lose your ability to make decisions for yourself  Advance directives can apply to any medical decision, such as the treatments you want, and if you want to donate organs  What are the types of advance directives? There are many types of advance directives, and each state has rules about how to use them  You may choose a combination of any of the following:  · Living will: This is a written record of the treatment you want  You can also choose which treatments you do not want, which to limit, and which to stop at a certain time  This includes surgery, medicine, IV fluid, and tube feedings  · Durable power of  for healthcare [facility]):   This is a written record that states who you want to make healthcare choices for you when you are unable to make them for yourself  This person, called a proxy, is usually a family member or a friend  You may choose more than 1 proxy  · Do not resuscitate (DNR) order:  A DNR order is used in case your heart stops beating or you stop breathing  It is a request not to have certain forms of treatment, such as CPR  A DNR order may be included in other types of advance directives  · Medical directive: This covers the care that you want if you are in a coma, near death, or unable to make decisions for yourself  You can list the treatments you want for each condition  Treatment may include pain medicine, surgery, blood transfusions, dialysis, IV or tube feedings, and a ventilator (breathing machine)  · Values history: This document has questions about your views, beliefs, and how you feel and think about life  This information can help others choose the care that you would choose  Why are advance directives important? An advance directive helps you control your care  Although spoken wishes may be used, it is better to have your wishes written down  Spoken wishes can be misunderstood, or not followed  Treatments may be given even if you do not want them  An advance directive may make it easier for your family to make difficult choices about your care  © Copyright [employer] 2018 Information is for End User's use only and may not be sold, redistributed or otherwise used for commercial purposes  All illustrations and images included in CareNotes® are the copyrighted property of [employer]  or [facility] Preventive Visit Patient Instructions  Thank you for completing your Welcome to Medicare Visit or Medicare Annual Wellness Visit today  Your next wellness visit will be due in one year (12/9/2020)  The screening/preventive services that you may require over the next 5-10 years are detailed below   Some tests may not apply to you based off risk factors and/or age  Screening tests ordered at today's visit but not completed yet may show as past due  Also, please note that scanned in results may not display below  Preventive Screenings:  Service Recommendations Previous Testing/Comments   Colorectal Cancer Screening  · Colonoscopy    · Fecal Occult Blood Test (FOBT)/Fecal Immunochemical Test (FIT)  · Fecal DNA/Cologuard Test  · Flexible Sigmoidoscopy Age: 54-65 years old   Colonoscopy: every 10 years (May be performed more frequently if at higher risk)  OR  FOBT/FIT: every 1 year  OR  Cologuard: every 3 years  OR  Sigmoidoscopy: every 5 years  Screening may be recommended earlier than age 48 if at higher risk for colorectal cancer  Also, an individualized decision between you and your healthcare provider will decide whether screening between the ages of 74-80 would be appropriate  Colonoscopy: 10/28/2019  FOBT/FIT: Not on file  Cologuard: Not on file  Sigmoidoscopy: Not on file    Screening Current  Risks and Benefits Discussed     Prostate Cancer Screening Individualized decision between patient and health care provider in men between ages of 53-78   Medicare will cover every 12 months beginning on the day after your 50th birthday PSA: 5 2 ng/mL     Screening Current  Risks and Benefits Discussed     Hepatitis C Screening Once for adults born between 1945 and 1965  More frequently in patients at high risk for Hepatitis C Hep C Antibody: 03/30/2016    Screening Current   Diabetes Screening 1-2 times per year if you're at risk for diabetes or have pre-diabetes Fasting glucose: 91 mg/dL   A1C: 5 8 %    Screening Current  Risks and Benefits Discussed  Screening Not Indicated   Cholesterol Screening Once every 5 years if you don't have a lipid disorder  May order more often based on risk factors   Lipid panel: 05/28/2019    Screening Not Indicated  History Lipid Disorder  Risks and Benefits Discussed  Screening Current      Other Preventive Screenings Covered by Medicare:  6  Abdominal Aortic Aneurysm (AAA) Screening: covered once if your at risk  You're considered to be at risk if you have a family history of AAA or a male between the age of 73-68 who smoking at least 100 cigarettes in your lifetime  7  Lung Cancer Screening: covers low dose CT scan once per year if you meet all of the following conditions: (1) Age 50-69; (2) No signs or symptoms of lung cancer; (3) Current smoker or have quit smoking within the last 15 years; (4) You have a tobacco smoking history of at least 30 pack years (packs per day x number of years you smoked); (5) You get a written order from a healthcare provider  8  Glaucoma Screening: covered annually if you're considered high risk: (1) You have diabetes OR (2) Family history of glaucoma OR (3)  aged 48 and older OR (3)  American aged 72 and older  5  Osteoporosis Screening: covered every 2 years if you meet one of the following conditions: (1) Have a vertebral abnormality; (2) On glucocorticoid therapy for more than 3 months; (3) Have primary hyperparathyroidism; (4) On osteoporosis medications and need to assess response to drug therapy  10  HIV Screening: covered annually if you're between the age of 12-76  Also covered annually if you are younger than 13 and older than 72 with risk factors for HIV infection  For pregnant patients, it is covered up to 3 times per pregnancy  Immunizations:  Immunization Recommendations   Influenza Vaccine Annual influenza vaccination during flu season is recommended for all persons aged >= 6 months who do not have contraindications   Pneumococcal Vaccine (Prevnar and Pneumovax)  * Prevnar = PCV13  * Pneumovax = PPSV23 Adults 25-60 years old: 1-3 doses may be recommended based on certain risk factors  Adults 72 years old: Prevnar (PCV13) vaccine recommended followed by Pneumovax (PPSV23) vaccine   If already received PPSV23 since turning 65, then PCV13 recommended at least one year after PPSV23 dose  Hepatitis B Vaccine 3 dose series if at intermediate or high risk (ex: diabetes, end stage renal disease, liver disease)   Tetanus (Td) Vaccine - COST NOT COVERED BY MEDICARE PART B Following completion of primary series, a booster dose should be given every 10 years to maintain immunity against tetanus  Td may also be given as tetanus wound prophylaxis  Tdap Vaccine - COST NOT COVERED BY MEDICARE PART B Recommended at least once for all adults  For pregnant patients, recommended with each pregnancy  Shingles Vaccine (Shingrix) - COST NOT COVERED BY MEDICARE PART B  2 shot series recommended in those aged 48 and above     Health Maintenance Due:      Topic Date Due    CRC Screening: Colonoscopy  10/28/2022    Hepatitis C Screening  Completed     Immunizations Due:      Topic Date Due    DTaP,Tdap,and Td Vaccines (1 - Tdap) 09/16/1961     Advance Directives   What are advance directives? Advance directives are legal documents that state your wishes and plans for medical care  These plans are made ahead of time in case you lose your ability to make decisions for yourself  Advance directives can apply to any medical decision, such as the treatments you want, and if you want to donate organs  What are the types of advance directives? There are many types of advance directives, and each state has rules about how to use them  You may choose a combination of any of the following:  · Living will: This is a written record of the treatment you want  You can also choose which treatments you do not want, which to limit, and which to stop at a certain time  This includes surgery, medicine, IV fluid, and tube feedings  · Durable power of  for healthcare [facility]): This is a written record that states who you want to make healthcare choices for you when you are unable to make them for yourself   This person, called a proxy, is usually a family member or a friend  You may choose more than 1 proxy  · Do not resuscitate (DNR) order:  A DNR order is used in case your heart stops beating or you stop breathing  It is a request not to have certain forms of treatment, such as CPR  A DNR order may be included in other types of advance directives  · Medical directive: This covers the care that you want if you are in a coma, near death, or unable to make decisions for yourself  You can list the treatments you want for each condition  Treatment may include pain medicine, surgery, blood transfusions, dialysis, IV or tube feedings, and a ventilator (breathing machine)  · Values history: This document has questions about your views, beliefs, and how you feel and think about life  This information can help others choose the care that you would choose  Why are advance directives important? An advance directive helps you control your care  Although spoken wishes may be used, it is better to have your wishes written down  Spoken wishes can be misunderstood, or not followed  Treatments may be given even if you do not want them  An advance directive may make it easier for your family to make difficult choices about your care  © Copyright [employer] 2018 Information is for End User's use only and may not be sold, redistributed or otherwise used for commercial purposes   All illustrations and images included in CareNotes® are the copyrighted property of [employer]  or [address] [place], [place] [id number]

## 2019-12-09 NOTE — PROGRESS NOTES
Assessment/Plan:    Vitamin D deficiency  Continue vitamin-D supplementation  Low mean corpuscular volume (MCV)  Continue with iron supplementation, to be taken every other day  Hyperlipidemia  He would like to defer on statin therapy continue with Omega 3 supplementation  Will recheck lipid panel in 6 months  Abnormal prostate specific antigen (PSA)  Continue to follow up with Urology  Continue with finasteride 5 mg daily  Abnormal fasting glucose  Continue lifestyle modifications  Defer on starting any diabetic medications  Will recheck A1c  Diagnoses and all orders for this visit:    Mixed hyperlipidemia  -     Comprehensive metabolic panel; Future  -     Lipid panel; Future  -     CBC; Future  -     Hemoglobin A1C; Future    Abnormal fasting glucose  -     Comprehensive metabolic panel; Future  -     Lipid panel; Future  -     CBC; Future  -     Hemoglobin A1C; Future    Abnormal prostate specific antigen (PSA)  -     Comprehensive metabolic panel; Future  -     CBC; Future    Low mean corpuscular volume (MCV)  -     Comprehensive metabolic panel; Future  -     CBC; Future    Vitamin D deficiency    Medicare annual wellness visit, initial    Screening for diabetes mellitus    Screening for cardiovascular condition                Subjective:      Patient ID: Evie Rivera is a 78 y o  male  78year old male is seen today for follow up of chronic conditions  He has been compliant with medication regimen  He takes iron supplementation daily however has been experiencing constipation  He is requesting to decrease dose to every other day  Iron levels are stable however at the lower limits          The 10-year ASCVD risk score (George Plata et al , 2013) is: 9 2%    Values used to calculate the score:      Age: 78 years      Sex: Male      Is Non- : Yes      Diabetic: No      Tobacco smoker: No      Systolic Blood Pressure: 866 mmHg      Is BP treated: No      HDL Cholesterol: 92 mg/dL      Total Cholesterol: 228 mg/dL    The following portions of the patient's history were reviewed and updated as appropriate: allergies, current medications, past family history, past medical history, past social history, past surgical history and problem list     Review of Systems   Constitutional: Negative for activity change, appetite change, chills, diaphoresis, fatigue and fever  HENT: Negative for congestion, postnasal drip, rhinorrhea, sinus pressure, sinus pain, sneezing and sore throat  Eyes: Negative for visual disturbance  Respiratory: Negative for apnea, cough, choking, chest tightness, shortness of breath and wheezing  Cardiovascular: Negative for chest pain, palpitations and leg swelling  Gastrointestinal: Negative for abdominal distention, abdominal pain, anal bleeding, blood in stool, constipation, diarrhea, nausea and vomiting  Endocrine: Negative for cold intolerance and heat intolerance  Genitourinary: Negative for difficulty urinating, dysuria and hematuria  Musculoskeletal: Negative  Skin: Negative  Neurological: Negative for dizziness, weakness, light-headedness, numbness and headaches  Hematological: Negative for adenopathy  Psychiatric/Behavioral: Negative for agitation, sleep disturbance and suicidal ideas  All other systems reviewed and are negative  Past Medical History:   Diagnosis Date    Constipation          Current Outpatient Medications:     Cyanocobalamin (VITAMIN B-12) 2500 MCG SUBL, Place 1 tablet under the tongue daily, Disp: , Rfl:     docusate sodium (COLACE) 100 mg capsule, 1 capsule daily  , Disp: , Rfl:     finasteride (PROSCAR) 5 mg tablet, Take 1 tablet (5 mg total) by mouth daily, Disp: 30 tablet, Rfl: 6    Multiple Vitamin (MULTIVITAMIN) tablet, Take 1 tablet by mouth daily, Disp: , Rfl:     Omega-3 1000 MG CAPS, 1 capsule daily  , Disp: , Rfl:     polyethylene glycol (MIRALAX) 17 g packet, Take 17 g by mouth daily as needed (Constipation), Disp: 14 each, Rfl: 0    Probiotic Product (PROBIOTIC-10 PO), 1 tablet daily  , Disp: , Rfl:     senna (SENOKOT) 8 6 mg, Take 1 tablet (8 6 mg total) by mouth daily at bedtime as needed for constipation, Disp: 120 each, Rfl: 0    sildenafil (REVATIO) 20 mg tablet, Take 1 tablet (20 mg total) by mouth as needed (PRN) Take 1-5 tablets as needed, Disp: 30 tablet, Rfl: 1    No Known Allergies    Social History   Past Surgical History:   Procedure Laterality Date    ABDOMINAL SURGERY      BUNIONECTOMY      HERNIA REPAIR  2010    HERNIA REPAIR  2016    HERNIA REPAIR       Family History   Problem Relation Age of Onset    Hypertension Mother     Uterine cancer Sister        Objective:  /78 (BP Location: Left arm, Patient Position: Sitting, Cuff Size: Standard)   Pulse 76   Temp 98 4 °F (36 9 °C) (Oral)   Ht 6' 1" (1 854 m)   Wt 72 6 kg (160 lb)   SpO2 100%   BMI 21 11 kg/m²     Recent Results (from the past 1344 hour(s))   Tissue Exam    Collection Time: 10/28/19 10:22 AM   Result Value Ref Range    Case Report       Surgical Pathology Report                         Case: [id number]                                   Authorizing Provider:  Jasbir Rodriguez MD       Collected:           10/28/2019 1022              Ordering Location:     [facility]        [facility]            [facility]/2019 [facility]                                                           Pathologist:           Lionel Roman MD                                                        Specimens:   A) - Polyp, Colorectal, transverse polyp cold snare                                                 B) - Polyp, Colorectal, sigmoid polyp cold forcep                                          Final Diagnosis       A  Transverse colon, cold snare:  - Tubular adenoma, minute  - Negative for high grade dysplasia/ carcinoma  - Melanosis coli      B  Sigmoid colon, cold forcep:  - Hyperplastic polyp    - Negative for dysplasia/ carcinoma  Additional Information       All controls performed with the immunohistochemical stains reported above reacted appropriately  These tests were developed and their performance characteristics determined by [facility] or [facility]  They may not be cleared or approved by the [place]  Food and Drug Administration  The FDA has determined that such clearance or approval is not necessary  These tests are used for clinical purposes  They should not be regarded as investigational or for research  This laboratory has been approved by [place] [address], designated as a high-complexity laboratory and is qualified to perform these tests  Interpretation performed at [place] [address]        Synoptic Checklist         (COLON/RECTUM POLYP FORM-GI - All Specimens)             : Adenoma(s)      Gross Description        A  The specimen is received in formalin, labeled with the patient's name and hospital number, and is designated "transverse polyp cold snare  The specimen consists of a tan flat elongated polypoid portion of soft tissue measuring 0 7 cm in greatest dimension  Entirely submitted in a screen cassette  B  The specimen is received in formalin, labeled with the patient's name and hospital number, and is designated "sigmoid polyp cold forceps  The specimen consists of 3 irregularly-shaped fragments of tan soft tissue ranging in size from 0 2-0 4 cm in greatest dimension  Entirely submitted in a screen cassette  Note: The estimated total formalin fixation time based upon information provided by the submitting clinician and the standard processing schedule is under 72 hours    RRavotti                            Iron Saturation %    Collection Time: 11/26/19  8:10 AM   Result Value Ref Range    Iron Saturation 28 %    TIBC 245 (L) 250 - 450 ug/dL    Iron 68 65 - 175 ug/dL   Ferritin    Collection Time: 11/26/19  8:10 AM   Result Value Ref Range    Ferritin 112 8 - 388 ng/mL            Physical Exam   Constitutional: He is oriented to person, place, and time  He appears well-developed and well-nourished  No distress  HENT:   Head: Normocephalic and atraumatic  Eyes: Pupils are equal, round, and reactive to light  Conjunctivae and EOM are normal  Right eye exhibits no discharge  Left eye exhibits no discharge  No scleral icterus  Neck: Normal range of motion  Neck supple  No JVD present  No thyromegaly present  Cardiovascular: Normal rate, regular rhythm, normal heart sounds and intact distal pulses  Exam reveals no gallop and no friction rub  No murmur heard  Pulmonary/Chest: Effort normal and breath sounds normal  No respiratory distress  He has no wheezes  He has no rales  He exhibits no tenderness  Abdominal: Soft  Bowel sounds are normal  He exhibits no distension and no mass  There is no tenderness  There is no rebound and no guarding  Musculoskeletal: Normal range of motion  He exhibits no edema, tenderness or deformity  Lymphadenopathy:     He has no cervical adenopathy  Neurological: He is alert and oriented to person, place, and time  He has normal reflexes  No cranial nerve deficit  Coordination normal    Skin: Skin is warm and dry  No rash noted  He is not diaphoretic  No erythema  No pallor  Psychiatric: He has a normal mood and affect  His behavior is normal  Judgment and thought content normal    Nursing note and vitals reviewed

## 2020-01-29 ENCOUNTER — OFFICE VISIT (OUTPATIENT)
Dept: INTERNAL MEDICINE CLINIC | Facility: CLINIC | Age: 70
End: 2020-01-29
Payer: COMMERCIAL

## 2020-01-29 VITALS
OXYGEN SATURATION: 96 % | SYSTOLIC BLOOD PRESSURE: 128 MMHG | HEART RATE: 72 BPM | TEMPERATURE: 97.9 F | BODY MASS INDEX: 21.34 KG/M2 | WEIGHT: 161 LBS | DIASTOLIC BLOOD PRESSURE: 82 MMHG | HEIGHT: 73 IN

## 2020-01-29 DIAGNOSIS — M62.838 SPASM OF ABDOMINAL MUSCLES OF LEFT SIDE: Primary | ICD-10-CM

## 2020-01-29 PROCEDURE — 99213 OFFICE O/P EST LOW 20 MIN: CPT | Performed by: INTERNAL MEDICINE

## 2020-01-29 PROCEDURE — 1036F TOBACCO NON-USER: CPT | Performed by: INTERNAL MEDICINE

## 2020-01-29 PROCEDURE — 3725F SCREEN DEPRESSION PERFORMED: CPT | Performed by: INTERNAL MEDICINE

## 2020-01-29 PROCEDURE — 3008F BODY MASS INDEX DOCD: CPT | Performed by: INTERNAL MEDICINE

## 2020-01-29 PROCEDURE — 1160F RVW MEDS BY RX/DR IN RCRD: CPT | Performed by: INTERNAL MEDICINE

## 2020-01-29 NOTE — PROGRESS NOTES
Assessment/Plan:    Spasm of abdominal muscles of left side  No recurrence of symptoms  Continue to monitor clinically  Diagnoses and all orders for this visit:    Spasm of abdominal muscles of left side            Depression Screening and Follow-up Plan: Patient's depression screening was positive with a PHQ-2 score of 0  Clincally patient does not have depression  No treatment is required  Falls Plan of Care: balance, strength, and gait training instructions were provided  Time spent during encounter: 15 minutes (counseling, reviewing medications, and discussing treatment and plan)    Subjective:      Patient ID: Fabian Williamson is a 78 y o  male  Chief Complaint   Patient presents with    Abdominal Pain     Patient is here c/o pain on left side of the abdomen on Sunday  Pt have been no having pain since then, but he is concern  Pain at the time was very strong, patient states  78year old male is seen today after he experienced abdominal pain 3 days ago that lasted for a few seconds  It occurred during Spiritism when he bent forward to  something from the floor  He suddenly felt a sharp pain of the LLQ  The pain lasted for a few second and he has not had recurrence of symptoms since  He has a h/o of multiple abdominal surgeries, in aprticular left sided hernia repair  The following portions of the patient's history were reviewed and updated as appropriate: allergies, current medications, past family history, past medical history, past social history, past surgical history and problem list     Review of Systems   Constitutional: Negative for activity change, appetite change, chills, diaphoresis, fatigue and fever  HENT: Negative for congestion, postnasal drip, rhinorrhea, sinus pressure, sinus pain, sneezing and sore throat  Eyes: Negative for visual disturbance  Respiratory: Negative for apnea, cough, choking, chest tightness, shortness of breath and wheezing  MRI Lumbar spine   Received: Today       [facility]irl[facility] Salcido Chronic Pain Prog Nurse Msg Pool Cc: Valerie Lira PA-C ordering MRI Lumbar Spine without contrast cpt code 95921 dx M79.651 (ICD-10-CM) - Right thigh pain     Call received from Michael MARIO with [facility] requesting authorization request be withdraw because PT notes clearly state therapy is helping.   Message sent to provider to advise how to proceed.          Cardiovascular: Negative for chest pain, palpitations and leg swelling  Gastrointestinal: Negative for abdominal distention, abdominal pain, anal bleeding, blood in stool, constipation, diarrhea, nausea and vomiting  Endocrine: Negative for cold intolerance and heat intolerance  Genitourinary: Negative for difficulty urinating, dysuria and hematuria  Musculoskeletal: Negative  Skin: Negative  Neurological: Negative for dizziness, weakness, light-headedness, numbness and headaches  Hematological: Negative for adenopathy  Psychiatric/Behavioral: Negative for agitation, sleep disturbance and suicidal ideas  All other systems reviewed and are negative  Past Medical History:   Diagnosis Date    Constipation          Current Outpatient Medications:     Cyanocobalamin (VITAMIN B-12) 2500 MCG SUBL, Place 1 tablet under the tongue daily, Disp: , Rfl:     docusate sodium (COLACE) 100 mg capsule, 1 capsule daily  , Disp: , Rfl:     Multiple Vitamin (MULTIVITAMIN) tablet, Take 1 tablet by mouth daily, Disp: , Rfl:     Omega-3 1000 MG CAPS, 1 capsule daily  , Disp: , Rfl:     polyethylene glycol (MIRALAX) 17 g packet, Take 17 g by mouth daily as needed (Constipation), Disp: 14 each, Rfl: 0    Probiotic Product (PROBIOTIC-10 PO), 1 tablet daily  , Disp: , Rfl:     senna (SENOKOT) 8 6 mg, Take 1 tablet (8 6 mg total) by mouth daily at bedtime as needed for constipation, Disp: 120 each, Rfl: 0    finasteride (PROSCAR) 5 mg tablet, Take 1 tablet (5 mg total) by mouth daily, Disp: 30 tablet, Rfl: 11    No Known Allergies    Social History   Past Surgical History:   Procedure Laterality Date    ABDOMINAL SURGERY      BUNIONECTOMY      HERNIA REPAIR  2010    HERNIA REPAIR  2016    HERNIA REPAIR       Family History   Problem Relation Age of Onset    Hypertension Mother     Uterine cancer Sister        Objective:  /82 (BP Location: Left arm, Patient Position: Sitting, Cuff Size: Standard) Pulse 72   Temp 97 9 °F (36 6 °C) (Oral)   Ht 6' 1" (1 854 m)   Wt 73 kg (161 lb)   SpO2 96%   BMI 21 24 kg/m²     Recent Results (from the past 1344 hour(s))   Comprehensive metabolic panel    Collection Time: 06/17/20 12:56 PM   Result Value Ref Range    Sodium 139 136 - 145 mmol/L    Potassium 3 5 3 5 - 5 3 mmol/L    Chloride 107 100 - 108 mmol/L    CO2 27 21 - 32 mmol/L    ANION GAP 5 4 - 13 mmol/L    BUN 15 5 - 25 mg/dL    Creatinine 1 05 0 60 - 1 30 mg/dL    Glucose, Fasting 87 65 - 99 mg/dL    Calcium 8 5 8 3 - 10 1 mg/dL    AST 15 5 - 45 U/L    ALT 23 12 - 78 U/L    Alkaline Phosphatase 56 46 - 116 U/L    Total Protein 6 8 6 4 - 8 2 g/dL    Albumin 3 7 3 5 - 5 0 g/dL    Total Bilirubin 0 68 0 20 - 1 00 mg/dL    eGFR 83 ml/min/1 73sq m   Lipid panel    Collection Time: 06/17/20 12:56 PM   Result Value Ref Range    Cholesterol 189 50 - 200 mg/dL    Triglycerides 46 <=150 mg/dL    HDL, Direct 90 >=40 mg/dL    LDL Calculated 90 0 - 100 mg/dL    Non-HDL-Chol (CHOL-HDL) 99 mg/dl   CBC    Collection Time: 06/17/20 12:56 PM   Result Value Ref Range    WBC 4 10 (L) 4 31 - 10 16 Thousand/uL    RBC 6 12 (H) 3 88 - 5 62 Million/uL    Hemoglobin 13 4 12 0 - 17 0 g/dL    Hematocrit 44 3 36 5 - 49 3 %    MCV 72 (L) 82 - 98 fL    MCH 21 9 (L) 26 8 - 34 3 pg    MCHC 30 2 (L) 31 4 - 37 4 g/dL    RDW 17 4 (H) 11 6 - 15 1 %    Platelets 515 675 - 799 Thousands/uL    MPV 11 1 8 9 - 12 7 fL   Hemoglobin A1C    Collection Time: 06/17/20 12:56 PM   Result Value Ref Range    Hemoglobin A1C 5 7 (H) Normal 3 8-5 6%; PreDiabetic 5 7-6 4%; Diabetic >=6 5%; Glycemic control for adults with diabetes <7 0% %     mg/dl            Physical Exam   Constitutional: He is oriented to person, place, and time  He appears well-developed and well-nourished  No distress  HENT:   Head: Normocephalic and atraumatic  Eyes: Pupils are equal, round, and reactive to light  Conjunctivae and EOM are normal  Right eye exhibits no discharge  Left eye exhibits no discharge  No scleral icterus  Neck: Normal range of motion  Neck supple  No JVD present  No thyromegaly present  Cardiovascular: Normal rate, regular rhythm, normal heart sounds and intact distal pulses  Exam reveals no gallop and no friction rub  No murmur heard  Pulmonary/Chest: Effort normal and breath sounds normal  No respiratory distress  He has no wheezes  He has no rales  He exhibits no tenderness  Abdominal: Soft  Bowel sounds are normal  He exhibits no distension and no mass  There is no tenderness  There is no rebound and no guarding  Musculoskeletal: Normal range of motion  He exhibits no edema, tenderness or deformity  Lymphadenopathy:     He has no cervical adenopathy  Neurological: He is alert and oriented to person, place, and time  He has normal reflexes  No cranial nerve deficit  Coordination normal    Skin: Skin is warm and dry  No rash noted  He is not diaphoretic  No erythema  No pallor  Psychiatric: He has a normal mood and affect  His behavior is normal  Judgment and thought content normal    Nursing note and vitals reviewed

## 2020-03-19 ENCOUNTER — TELEPHONE (OUTPATIENT)
Dept: INTERNAL MEDICINE CLINIC | Facility: CLINIC | Age: 70
End: 2020-03-19

## 2020-03-19 NOTE — TELEPHONE ENCOUNTER
Pt is calling because pt and his wife were supposed to fly to [place] and they have insurance but because of the pt's wife's underlying conditions that are respiratory related they can no longer fly on 4/10/2020  They need a letter stating that the pt is her caretaker for the insurance company in order to get their insurance money refunded

## 2020-04-08 ENCOUNTER — APPOINTMENT (OUTPATIENT)
Dept: LAB | Age: 70
End: 2020-04-08
Payer: COMMERCIAL

## 2020-04-08 DIAGNOSIS — R97.20 ELEVATED PSA: ICD-10-CM

## 2020-04-08 LAB — PSA SERPL-MCNC: 3.3 NG/ML (ref 0–4)

## 2020-04-08 PROCEDURE — 36415 COLL VENOUS BLD VENIPUNCTURE: CPT

## 2020-04-08 PROCEDURE — G0103 PSA SCREENING: HCPCS

## 2020-04-24 ENCOUNTER — TELEMEDICINE (OUTPATIENT)
Dept: UROLOGY | Facility: AMBULATORY SURGERY CENTER | Age: 70
End: 2020-04-24
Payer: COMMERCIAL

## 2020-04-24 DIAGNOSIS — R97.20 ELEVATED PSA: Primary | ICD-10-CM

## 2020-04-24 DIAGNOSIS — R35.0 BENIGN PROSTATIC HYPERPLASIA WITH URINARY FREQUENCY: ICD-10-CM

## 2020-04-24 DIAGNOSIS — N40.1 BENIGN PROSTATIC HYPERPLASIA WITH URINARY FREQUENCY: ICD-10-CM

## 2020-04-24 PROCEDURE — 99442 PR PHYS/QHP TELEPHONE EVALUATION 11-20 MIN: CPT | Performed by: NURSE PRACTITIONER

## 2020-04-24 RX ORDER — FINASTERIDE 5 MG/1
5 TABLET, FILM COATED ORAL DAILY
Qty: 30 TABLET | Refills: 11 | Status: SHIPPED | OUTPATIENT
Start: 2020-04-24 | End: 2021-04-12

## 2020-06-17 ENCOUNTER — DOCTOR'S OFFICE (OUTPATIENT)
Dept: URBAN - METROPOLITAN AREA CLINIC 137 | Facility: CLINIC | Age: 70
Setting detail: OPHTHALMOLOGY
End: 2020-06-17
Payer: COMMERCIAL

## 2020-06-17 ENCOUNTER — TRANSCRIBE ORDERS (OUTPATIENT)
Dept: ADMINISTRATIVE | Age: 70
End: 2020-06-17

## 2020-06-17 ENCOUNTER — APPOINTMENT (OUTPATIENT)
Dept: LAB | Age: 70
End: 2020-06-17
Payer: COMMERCIAL

## 2020-06-17 DIAGNOSIS — H25.11: ICD-10-CM

## 2020-06-17 DIAGNOSIS — H35.3112: ICD-10-CM

## 2020-06-17 DIAGNOSIS — H11.152: ICD-10-CM

## 2020-06-17 DIAGNOSIS — H25.12: ICD-10-CM

## 2020-06-17 DIAGNOSIS — H11.151: ICD-10-CM

## 2020-06-17 DIAGNOSIS — H35.3122: ICD-10-CM

## 2020-06-17 DIAGNOSIS — E78.2 MIXED HYPERLIPIDEMIA: ICD-10-CM

## 2020-06-17 DIAGNOSIS — R73.01 ABNORMAL FASTING GLUCOSE: ICD-10-CM

## 2020-06-17 DIAGNOSIS — R71.8 LOW MEAN CORPUSCULAR VOLUME (MCV): ICD-10-CM

## 2020-06-17 DIAGNOSIS — R97.20 ABNORMAL PROSTATE SPECIFIC ANTIGEN (PSA): ICD-10-CM

## 2020-06-17 DIAGNOSIS — H40.013: ICD-10-CM

## 2020-06-17 LAB
ALBUMIN SERPL BCP-MCNC: 3.7 G/DL (ref 3.5–5)
ALP SERPL-CCNC: 56 U/L (ref 46–116)
ALT SERPL W P-5'-P-CCNC: 23 U/L (ref 12–78)
ANION GAP SERPL CALCULATED.3IONS-SCNC: 5 MMOL/L (ref 4–13)
AST SERPL W P-5'-P-CCNC: 15 U/L (ref 5–45)
BILIRUB SERPL-MCNC: 0.68 MG/DL (ref 0.2–1)
BUN SERPL-MCNC: 15 MG/DL (ref 5–25)
CALCIUM SERPL-MCNC: 8.5 MG/DL (ref 8.3–10.1)
CHLORIDE SERPL-SCNC: 107 MMOL/L (ref 100–108)
CHOLEST SERPL-MCNC: 189 MG/DL (ref 50–200)
CO2 SERPL-SCNC: 27 MMOL/L (ref 21–32)
CREAT SERPL-MCNC: 1.05 MG/DL (ref 0.6–1.3)
ERYTHROCYTE [DISTWIDTH] IN BLOOD BY AUTOMATED COUNT: 17.4 % (ref 11.6–15.1)
EST. AVERAGE GLUCOSE BLD GHB EST-MCNC: 117 MG/DL
GFR SERPL CREATININE-BSD FRML MDRD: 83 ML/MIN/1.73SQ M
GLUCOSE P FAST SERPL-MCNC: 87 MG/DL (ref 65–99)
HBA1C MFR BLD: 5.7 %
HCT VFR BLD AUTO: 44.3 % (ref 36.5–49.3)
HDLC SERPL-MCNC: 90 MG/DL
HGB BLD-MCNC: 13.4 G/DL (ref 12–17)
LDLC SERPL CALC-MCNC: 90 MG/DL (ref 0–100)
MCH RBC QN AUTO: 21.9 PG (ref 26.8–34.3)
MCHC RBC AUTO-ENTMCNC: 30.2 G/DL (ref 31.4–37.4)
MCV RBC AUTO: 72 FL (ref 82–98)
NONHDLC SERPL-MCNC: 99 MG/DL
PLATELET # BLD AUTO: 229 THOUSANDS/UL (ref 149–390)
PMV BLD AUTO: 11.1 FL (ref 8.9–12.7)
POTASSIUM SERPL-SCNC: 3.5 MMOL/L (ref 3.5–5.3)
PROT SERPL-MCNC: 6.8 G/DL (ref 6.4–8.2)
RBC # BLD AUTO: 6.12 MILLION/UL (ref 3.88–5.62)
SODIUM SERPL-SCNC: 139 MMOL/L (ref 136–145)
TRIGL SERPL-MCNC: 46 MG/DL
WBC # BLD AUTO: 4.1 THOUSAND/UL (ref 4.31–10.16)

## 2020-06-17 PROCEDURE — 80053 COMPREHEN METABOLIC PANEL: CPT

## 2020-06-17 PROCEDURE — 85027 COMPLETE CBC AUTOMATED: CPT

## 2020-06-17 PROCEDURE — 76514 ECHO EXAM OF EYE THICKNESS: CPT | Performed by: OPHTHALMOLOGY

## 2020-06-17 PROCEDURE — 92014 COMPRE OPH EXAM EST PT 1/>: CPT | Performed by: OPHTHALMOLOGY

## 2020-06-17 PROCEDURE — 83036 HEMOGLOBIN GLYCOSYLATED A1C: CPT

## 2020-06-17 PROCEDURE — 80061 LIPID PANEL: CPT

## 2020-06-17 PROCEDURE — 92133 CPTRZD OPH DX IMG PST SGM ON: CPT | Performed by: OPHTHALMOLOGY

## 2020-06-17 PROCEDURE — 36415 COLL VENOUS BLD VENIPUNCTURE: CPT

## 2020-06-17 ASSESSMENT — AXIALLENGTH_DERIVED
OD_AL: 22.8807
OS_AL: 23.2032
OS_AL: 23.2032
OD_AL: 23.1589

## 2020-06-17 ASSESSMENT — REFRACTION_CURRENTRX
OD_VPRISM_DIRECTION: PROGS
OS_CYLINDER: +0.50
OS_SPHERE: +1.00
OS_ADD: +2.25
OS_OVR_VA: 20/
OD_AXIS: 129
OD_OVR_VA: 20/
OD_ADD: +2.25
OS_VPRISM_DIRECTION: PROGS
OS_AXIS: 002
OD_SPHERE: +1.00
OD_CYLINDER: +0.25

## 2020-06-17 ASSESSMENT — KERATOMETRY
OD_K2POWER_DIOPTERS: 43.75
OS_K1POWER_DIOPTERS: 42.75
OD_K1POWER_DIOPTERS: 43.00
OS_AXISANGLE_DEGREES: 075
OD_AXISANGLE_DEGREES: 033
OS_K2POWER_DIOPTERS: 43.75

## 2020-06-17 ASSESSMENT — REFRACTION_MANIFEST
OS_VA1: 20/20
OS_CYLINDER: -0.50
OS_SPHERE: +1.50
OD_SPHERE: +1.50
OS_AXIS: 090
OU_VA: 20/20
OS_ADD: +2.25
OD_CYLINDER: -0.50
OD_ADD: +2.25
OD_AXIS: 130
OD_VA1: 20/20

## 2020-06-17 ASSESSMENT — SPHEQUIV_DERIVED
OD_SPHEQUIV: 2
OD_SPHEQUIV: 1.25
OS_SPHEQUIV: 1.25
OS_SPHEQUIV: 1.25

## 2020-06-17 ASSESSMENT — PACHYMETRY
OS_CT_CORRECTION: 1
OD_CT_UM: 538
OD_CT_CORRECTION: 1
OS_CT_UM: 531

## 2020-06-17 ASSESSMENT — VISUAL ACUITY
OD_BCVA: 20/20-2
OS_BCVA: 20/20

## 2020-06-17 ASSESSMENT — REFRACTION_AUTOREFRACTION
OS_CYLINDER: -1.50
OD_CYLINDER: -0.50
OS_SPHERE: +2.00
OD_SPHERE: +2.25
OS_AXIS: 074
OD_AXIS: 052

## 2020-06-17 ASSESSMENT — CONFRONTATIONAL VISUAL FIELD TEST (CVF)
OD_FINDINGS: FULL
OS_FINDINGS: FULL

## 2020-06-23 ENCOUNTER — OFFICE VISIT (OUTPATIENT)
Dept: INTERNAL MEDICINE CLINIC | Facility: CLINIC | Age: 70
End: 2020-06-23
Payer: COMMERCIAL

## 2020-06-23 ENCOUNTER — RX ONLY (RX ONLY)
Age: 70
End: 2020-06-23

## 2020-06-23 VITALS
SYSTOLIC BLOOD PRESSURE: 124 MMHG | WEIGHT: 157 LBS | HEART RATE: 62 BPM | OXYGEN SATURATION: 99 % | HEIGHT: 73 IN | TEMPERATURE: 98.6 F | DIASTOLIC BLOOD PRESSURE: 70 MMHG | BODY MASS INDEX: 20.81 KG/M2

## 2020-06-23 DIAGNOSIS — R73.01 ABNORMAL FASTING GLUCOSE: ICD-10-CM

## 2020-06-23 DIAGNOSIS — M21.619 BUNION: ICD-10-CM

## 2020-06-23 DIAGNOSIS — Z83.2 FAMILY HISTORY OF THALASSEMIA: Primary | ICD-10-CM

## 2020-06-23 DIAGNOSIS — D50.8 OTHER IRON DEFICIENCY ANEMIA: ICD-10-CM

## 2020-06-23 DIAGNOSIS — R71.8 LOW MEAN CORPUSCULAR VOLUME (MCV): ICD-10-CM

## 2020-06-23 DIAGNOSIS — R97.20 ABNORMAL PROSTATE SPECIFIC ANTIGEN (PSA): ICD-10-CM

## 2020-06-23 DIAGNOSIS — E78.2 MIXED HYPERLIPIDEMIA: ICD-10-CM

## 2020-06-23 DIAGNOSIS — R33.9 URINARY RETENTION: ICD-10-CM

## 2020-06-23 PROCEDURE — 4040F PNEUMOC VAC/ADMIN/RCVD: CPT | Performed by: INTERNAL MEDICINE

## 2020-06-23 PROCEDURE — 1160F RVW MEDS BY RX/DR IN RCRD: CPT | Performed by: INTERNAL MEDICINE

## 2020-06-23 PROCEDURE — 3008F BODY MASS INDEX DOCD: CPT | Performed by: INTERNAL MEDICINE

## 2020-06-23 PROCEDURE — 99214 OFFICE O/P EST MOD 30 MIN: CPT | Performed by: INTERNAL MEDICINE

## 2020-06-23 PROCEDURE — 1036F TOBACCO NON-USER: CPT | Performed by: INTERNAL MEDICINE

## 2020-06-23 RX ORDER — TIMOLOL MALEATE 5 MG/ML
1 SOLUTION/ DROPS OPHTHALMIC 2 TIMES DAILY
COMMUNITY
Start: 2020-06-17 | End: 2022-05-28

## 2020-06-23 RX ORDER — LATANOPROST 50 UG/ML
1 SOLUTION/ DROPS OPHTHALMIC
COMMUNITY
Start: 2020-06-22

## 2020-06-24 ENCOUNTER — TELEPHONE (OUTPATIENT)
Dept: HEMATOLOGY ONCOLOGY | Facility: CLINIC | Age: 70
End: 2020-06-24

## 2020-07-15 ENCOUNTER — DOCTOR'S OFFICE (OUTPATIENT)
Dept: URBAN - METROPOLITAN AREA CLINIC 137 | Facility: CLINIC | Age: 70
Setting detail: OPHTHALMOLOGY
End: 2020-07-15
Payer: COMMERCIAL

## 2020-07-15 DIAGNOSIS — H11.513: ICD-10-CM

## 2020-07-15 DIAGNOSIS — H35.3132: ICD-10-CM

## 2020-07-15 DIAGNOSIS — H40.013: ICD-10-CM

## 2020-07-15 DIAGNOSIS — H25.13: ICD-10-CM

## 2020-07-15 PROCEDURE — 92012 INTRM OPH EXAM EST PATIENT: CPT | Performed by: OPHTHALMOLOGY

## 2020-07-15 PROCEDURE — 92134 CPTRZ OPH DX IMG PST SGM RTA: CPT | Performed by: OPHTHALMOLOGY

## 2020-07-15 ASSESSMENT — REFRACTION_MANIFEST
OU_VA: 20/20
OS_SPHERE: +1.50
OD_AXIS: 130
OD_CYLINDER: -0.50
OD_VA1: 20/20
OS_AXIS: 090
OS_CYLINDER: -0.50
OD_ADD: +2.25
OS_ADD: +2.25
OD_SPHERE: +1.50
OS_VA1: 20/20

## 2020-07-15 ASSESSMENT — VISUAL ACUITY
OS_BCVA: 20/30
OD_BCVA: 20/30-1

## 2020-07-15 ASSESSMENT — KERATOMETRY
OD_K2POWER_DIOPTERS: 43.75
OD_AXISANGLE_DEGREES: 033
OS_AXISANGLE_DEGREES: 075
OS_K1POWER_DIOPTERS: 42.75
OS_K2POWER_DIOPTERS: 43.75
OD_K1POWER_DIOPTERS: 43.00

## 2020-07-15 ASSESSMENT — REFRACTION_AUTOREFRACTION
OD_CYLINDER: -0.50
OD_SPHERE: +2.25
OD_AXIS: 052
OS_CYLINDER: -1.50
OS_SPHERE: +2.00
OS_AXIS: 074

## 2020-07-15 ASSESSMENT — REFRACTION_CURRENTRX
OS_OVR_VA: 20/
OS_ADD: +2.25
OS_CYLINDER: +0.50
OD_SPHERE: +1.00
OS_VPRISM_DIRECTION: PROGS
OD_ADD: +2.25
OD_VPRISM_DIRECTION: PROGS
OD_AXIS: 129
OD_CYLINDER: +0.25
OS_AXIS: 002
OD_OVR_VA: 20/
OS_SPHERE: +1.00

## 2020-07-15 ASSESSMENT — CONFRONTATIONAL VISUAL FIELD TEST (CVF)
OS_FINDINGS: FULL
OD_FINDINGS: FULL

## 2020-07-15 ASSESSMENT — AXIALLENGTH_DERIVED
OD_AL: 23.1589
OS_AL: 23.2032
OD_AL: 22.8807
OS_AL: 23.2032

## 2020-07-15 ASSESSMENT — SPHEQUIV_DERIVED
OS_SPHEQUIV: 1.25
OD_SPHEQUIV: 2
OS_SPHEQUIV: 1.25
OD_SPHEQUIV: 1.25

## 2020-07-28 ENCOUNTER — APPOINTMENT (OUTPATIENT)
Dept: LAB | Age: 70
End: 2020-07-28
Payer: COMMERCIAL

## 2020-07-28 DIAGNOSIS — Z83.2 FAMILY HISTORY OF THALASSEMIA: ICD-10-CM

## 2020-07-28 DIAGNOSIS — D50.8 OTHER IRON DEFICIENCY ANEMIA: ICD-10-CM

## 2020-07-28 DIAGNOSIS — R97.20 ELEVATED PSA: ICD-10-CM

## 2020-07-28 LAB
FERRITIN SERPL-MCNC: 142 NG/ML (ref 8–388)
IRON SATN MFR SERPL: 31 %
IRON SERPL-MCNC: 72 UG/DL (ref 65–175)
TIBC SERPL-MCNC: 232 UG/DL (ref 250–450)

## 2020-07-28 PROCEDURE — 82728 ASSAY OF FERRITIN: CPT

## 2020-07-28 PROCEDURE — 36415 COLL VENOUS BLD VENIPUNCTURE: CPT

## 2020-07-28 PROCEDURE — 83550 IRON BINDING TEST: CPT

## 2020-07-28 PROCEDURE — 83540 ASSAY OF IRON: CPT

## 2020-08-05 ENCOUNTER — CONSULT (OUTPATIENT)
Dept: HEMATOLOGY ONCOLOGY | Facility: CLINIC | Age: 70
End: 2020-08-05
Payer: COMMERCIAL

## 2020-08-05 VITALS
RESPIRATION RATE: 16 BRPM | BODY MASS INDEX: 20.75 KG/M2 | WEIGHT: 156.6 LBS | HEIGHT: 73 IN | HEART RATE: 61 BPM | DIASTOLIC BLOOD PRESSURE: 80 MMHG | TEMPERATURE: 97.3 F | SYSTOLIC BLOOD PRESSURE: 122 MMHG | OXYGEN SATURATION: 98 %

## 2020-08-05 DIAGNOSIS — Z83.2 FAMILY HISTORY OF THALASSEMIA: ICD-10-CM

## 2020-08-05 DIAGNOSIS — D50.8 OTHER IRON DEFICIENCY ANEMIA: ICD-10-CM

## 2020-08-05 PROCEDURE — 1160F RVW MEDS BY RX/DR IN RCRD: CPT | Performed by: INTERNAL MEDICINE

## 2020-08-05 PROCEDURE — 1036F TOBACCO NON-USER: CPT | Performed by: INTERNAL MEDICINE

## 2020-08-05 PROCEDURE — 99203 OFFICE O/P NEW LOW 30 MIN: CPT | Performed by: INTERNAL MEDICINE

## 2020-08-05 PROCEDURE — 3008F BODY MASS INDEX DOCD: CPT | Performed by: INTERNAL MEDICINE

## 2020-08-05 NOTE — PROGRESS NOTES
Oncology Consult Note  Fili Rogers 78 y o  male MRN: [id number]  Unit/Bed#:  Encounter: [id number]      Presenting Complaint:  Possible thalassemia trait    History of Presenting Illness:  71-year-old  male with history of inguinal hernia repair, mixed hyperlipidemia, benign prostatic hypertrophy, intestinal malrotation with chronic low MCV and elevated RBC without any anemia for many years, the patient told me his sister and his mother had a history of inherited anemia    The patient moved to [facility]    Thalassemia profile is ordered and is pending at the time of the dictation    He denies any headache blurred vision nausea vomiting diarrhea constipation dysuria hematuria melena hematochezia heat or cold intolerance weight changes skin rash, tingling or numbness    He used to drink in the past, he does not smoke cigarettes  Review of Systems - As stated in the HPI otherwise the fourteen point review of systems was negative      Past Medical History:   Diagnosis Date    Constipation        Social History     Socioeconomic History    Marital status: /Civil Union     Spouse name: None    Number of children: None    Years of education: None    Highest education level: None   Occupational History    None   Social Needs    Financial resource strain: None    Food insecurity     Worry: None     Inability: None    Transportation needs     Medical: None     Non-medical: None   Tobacco Use    Smoking status: Never Smoker    Smokeless tobacco: Never Used   Substance and Sexual Activity    Alcohol use: Not Currently     Frequency: Monthly or less     Comment: Wine rarely    Drug use: No    Sexual activity: Yes   Lifestyle    Physical activity     Days per week: None     Minutes per session: None    Stress: None   Relationships    Social connections     Talks on phone: None     Gets together: None     Attends Sikhism service: None     Active member of club or organization: None     Attends meetings of clubs or organizations: None     Relationship status: None    Intimate partner violence     Fear of current or ex partner: None     Emotionally abused: None     Physically abused: None     Forced sexual activity: None   Other Topics Concern    None   Social History Narrative    None       Family History   Problem Relation Age of Onset    Hypertension Mother     Uterine cancer Sister        No Known Allergies      Current Outpatient Medications:     Cyanocobalamin (VITAMIN B-12) 2500 MCG SUBL, Place 1 tablet under the tongue daily, Disp: , Rfl:     docusate sodium (COLACE) 100 mg capsule, 1 capsule daily  , Disp: , Rfl:     finasteride (PROSCAR) 5 mg tablet, Take 1 tablet (5 mg total) by mouth daily, Disp: 30 tablet, Rfl: 11    latanoprost (XALATAN) 0 005 % ophthalmic solution, , Disp: , Rfl:     Multiple Vitamin (MULTIVITAMIN) tablet, Take 1 tablet by mouth daily, Disp: , Rfl:     Omega-3 1000 MG CAPS, 1 capsule daily  , Disp: , Rfl:     polyethylene glycol (MIRALAX) 17 g packet, Take 17 g by mouth daily as needed (Constipation), Disp: 14 each, Rfl: 0    Probiotic Product (PROBIOTIC-10 PO), 1 tablet daily  , Disp: , Rfl:     senna (SENOKOT) 8 6 mg, Take 1 tablet (8 6 mg total) by mouth daily at bedtime as needed for constipation, Disp: 120 each, Rfl: 0    timolol (TIMOPTIC) 0 5 % ophthalmic solution, , Disp: , Rfl:       /80 (BP Location: Left arm, Patient Position: Sitting, Cuff Size: Adult)   Pulse 61   Temp (!) 97 3 °F (36 3 °C) (Tympanic)   Resp 16   Ht 6' 1"   Wt 71 kg (156 lb 9 6 oz)   SpO2 98%   BMI 20 66 kg/m²       General Appearance:    Alert, oriented        Eyes:    PERRL   Ears:    Normal external ear canals, both ears   Nose:   Nares normal, septum midline   Throat:   Mucosa moist  Pharynx without injection      Neck:   Supple       Lungs:     Clear to auscultation bilaterally   Chest Wall:    No tenderness or deformity    Heart: Regular rate and rhythm       Abdomen:     Soft, non-tender, bowel sounds +, no organomegaly           Extremities:   Extremities no cyanosis or edema       Skin:   no rash or icterus  Lymph nodes:   Cervical, supraclavicular, and axillary nodes normal   Neurologic:   CNII-XII intact, normal strength, sensation and reflexes     Throughout               No results found for this or any previous visit (from the past 48 hour(s))  No results found    ECOG       Assessment and plan:  Microcytosis with elevated RBC in a patient who had strong family history of blood disorder most likely thalassemia trait, no evidence of anemia, he has sufficient iron studies    Await for molecular test for thalassemia gene profile    Otherwise patient does not need any other medication, and will follow-up on as-needed basis

## 2020-08-12 LAB — HBB GENE MUT ANL BLD/T: NORMAL

## 2020-08-27 ENCOUNTER — TELEPHONE (OUTPATIENT)
Dept: INTERNAL MEDICINE CLINIC | Age: 70
End: 2020-08-27

## 2020-08-27 NOTE — TELEPHONE ENCOUNTER
----- Message from Chace Carlson MD sent at 8/27/2020  3:09 PM EDT -----  Please contact patient to inform him that his thalassemia workup was negative  No further testing needed at this time

## 2020-09-15 ENCOUNTER — TRANSCRIBE ORDERS (OUTPATIENT)
Dept: ADMINISTRATIVE | Age: 70
End: 2020-09-15

## 2020-09-15 ENCOUNTER — OFFICE VISIT (OUTPATIENT)
Dept: LAB | Age: 70
End: 2020-09-15
Payer: COMMERCIAL

## 2020-09-15 ENCOUNTER — LAB (OUTPATIENT)
Dept: LAB | Age: 70
End: 2020-09-15
Payer: COMMERCIAL

## 2020-09-15 DIAGNOSIS — Z01.818 OTHER SPECIFIED PRE-OPERATIVE EXAMINATION: Primary | ICD-10-CM

## 2020-09-15 DIAGNOSIS — Z01.818 OTHER SPECIFIED PRE-OPERATIVE EXAMINATION: ICD-10-CM

## 2020-09-15 DIAGNOSIS — Z01.812 PRE-OPERATIVE LABORATORY EXAMINATION: ICD-10-CM

## 2020-09-15 LAB
ANION GAP SERPL CALCULATED.3IONS-SCNC: 6 MMOL/L (ref 4–13)
APTT PPP: 30 SECONDS (ref 23–37)
ATRIAL RATE: 58 BPM
BASOPHILS # BLD AUTO: 0.04 THOUSANDS/ΜL (ref 0–0.1)
BASOPHILS NFR BLD AUTO: 1 % (ref 0–1)
BUN SERPL-MCNC: 17 MG/DL (ref 5–25)
CALCIUM SERPL-MCNC: 9.5 MG/DL (ref 8.3–10.1)
CHLORIDE SERPL-SCNC: 107 MMOL/L (ref 100–108)
CO2 SERPL-SCNC: 28 MMOL/L (ref 21–32)
CREAT SERPL-MCNC: 1.02 MG/DL (ref 0.6–1.3)
EOSINOPHIL # BLD AUTO: 0.16 THOUSAND/ΜL (ref 0–0.61)
EOSINOPHIL NFR BLD AUTO: 3 % (ref 0–6)
ERYTHROCYTE [DISTWIDTH] IN BLOOD BY AUTOMATED COUNT: 17.1 % (ref 11.6–15.1)
GFR SERPL CREATININE-BSD FRML MDRD: 86 ML/MIN/1.73SQ M
GLUCOSE P FAST SERPL-MCNC: 83 MG/DL (ref 65–99)
HCT VFR BLD AUTO: 45.9 % (ref 36.5–49.3)
HGB BLD-MCNC: 13.7 G/DL (ref 12–17)
IMM GRANULOCYTES # BLD AUTO: 0.01 THOUSAND/UL (ref 0–0.2)
IMM GRANULOCYTES NFR BLD AUTO: 0 % (ref 0–2)
INR PPP: 1.05 (ref 0.84–1.19)
LYMPHOCYTES # BLD AUTO: 0.95 THOUSANDS/ΜL (ref 0.6–4.47)
LYMPHOCYTES NFR BLD AUTO: 20 % (ref 14–44)
MCH RBC QN AUTO: 21.9 PG (ref 26.8–34.3)
MCHC RBC AUTO-ENTMCNC: 29.8 G/DL (ref 31.4–37.4)
MCV RBC AUTO: 73 FL (ref 82–98)
MONOCYTES # BLD AUTO: 0.44 THOUSAND/ΜL (ref 0.17–1.22)
MONOCYTES NFR BLD AUTO: 9 % (ref 4–12)
NEUTROPHILS # BLD AUTO: 3.07 THOUSANDS/ΜL (ref 1.85–7.62)
NEUTS SEG NFR BLD AUTO: 67 % (ref 43–75)
NRBC BLD AUTO-RTO: 0 /100 WBCS
P AXIS: 52 DEGREES
PLATELET # BLD AUTO: 263 THOUSANDS/UL (ref 149–390)
PMV BLD AUTO: 11.7 FL (ref 8.9–12.7)
POTASSIUM SERPL-SCNC: 4 MMOL/L (ref 3.5–5.3)
PR INTERVAL: 166 MS
PROTHROMBIN TIME: 13.7 SECONDS (ref 11.6–14.5)
QRS AXIS: 49 DEGREES
QRSD INTERVAL: 94 MS
QT INTERVAL: 428 MS
QTC INTERVAL: 420 MS
RBC # BLD AUTO: 6.27 MILLION/UL (ref 3.88–5.62)
SODIUM SERPL-SCNC: 141 MMOL/L (ref 136–145)
T WAVE AXIS: 48 DEGREES
VENTRICULAR RATE: 58 BPM
WBC # BLD AUTO: 4.67 THOUSAND/UL (ref 4.31–10.16)

## 2020-09-15 PROCEDURE — 93005 ELECTROCARDIOGRAM TRACING: CPT

## 2020-09-15 PROCEDURE — 36415 COLL VENOUS BLD VENIPUNCTURE: CPT

## 2020-09-15 PROCEDURE — 80048 BASIC METABOLIC PNL TOTAL CA: CPT

## 2020-09-15 PROCEDURE — 93010 ELECTROCARDIOGRAM REPORT: CPT | Performed by: INTERNAL MEDICINE

## 2020-09-15 PROCEDURE — 85610 PROTHROMBIN TIME: CPT

## 2020-09-15 PROCEDURE — 85730 THROMBOPLASTIN TIME PARTIAL: CPT

## 2020-09-15 PROCEDURE — 85025 COMPLETE CBC W/AUTO DIFF WBC: CPT

## 2020-09-17 ENCOUNTER — CONSULT (OUTPATIENT)
Dept: INTERNAL MEDICINE CLINIC | Facility: CLINIC | Age: 70
End: 2020-09-17
Payer: COMMERCIAL

## 2020-09-17 VITALS
HEIGHT: 73 IN | WEIGHT: 155 LBS | BODY MASS INDEX: 20.54 KG/M2 | SYSTOLIC BLOOD PRESSURE: 112 MMHG | TEMPERATURE: 98.2 F | DIASTOLIC BLOOD PRESSURE: 72 MMHG | HEART RATE: 60 BPM | OXYGEN SATURATION: 99 %

## 2020-09-17 DIAGNOSIS — M21.611 BUNION OF RIGHT FOOT: ICD-10-CM

## 2020-09-17 DIAGNOSIS — Z83.2 FAMILY HISTORY OF THALASSEMIA: ICD-10-CM

## 2020-09-17 DIAGNOSIS — Z87.19 HISTORY OF INTESTINAL MALABSORPTION: ICD-10-CM

## 2020-09-17 DIAGNOSIS — Z01.818 PREOP EXAM FOR INTERNAL MEDICINE: Primary | ICD-10-CM

## 2020-09-17 PROBLEM — M62.838 SPASM OF ABDOMINAL MUSCLES OF LEFT SIDE: Status: RESOLVED | Noted: 2020-01-29 | Resolved: 2020-09-17

## 2020-09-17 PROBLEM — Z23 IMMUNIZATION DUE: Status: RESOLVED | Noted: 2019-01-09 | Resolved: 2020-09-17

## 2020-09-17 PROBLEM — E78.2 MIXED HYPERLIPIDEMIA: Status: RESOLVED | Noted: 2019-01-09 | Resolved: 2020-09-17

## 2020-09-17 PROCEDURE — 3725F SCREEN DEPRESSION PERFORMED: CPT | Performed by: INTERNAL MEDICINE

## 2020-09-17 PROCEDURE — 99213 OFFICE O/P EST LOW 20 MIN: CPT | Performed by: INTERNAL MEDICINE

## 2020-09-17 PROCEDURE — 1036F TOBACCO NON-USER: CPT | Performed by: INTERNAL MEDICINE

## 2020-09-17 NOTE — PROGRESS NOTES
Subjective:    Chief Complaint   Patient presents with    Pre-op Exam     Preop 10/12/20 right bunionectomy, Dr Miquel Sykes, PA [facility], phone [phone], fax [phone], MAC or local, at [facility]  Shirin Hough is a 69y o  year old male who presents to the office today for a preoperative consultation at the request of surgeon Dr Miquel Sykes who plans on performing right bunionectomy on October 12  This consultation is requested for the specific conditions prompting preoperative evaluation (i e  because of potential affect on operative risk)  Planned anesthesia: general and IV sedation  The patient has no known anesthesia issues  Patients bleeding risk: no recent abnormal bleeding, no remote history of abnormal bleeding and no use of Ca-channel blockers  Patient does not have objections to receiving blood products if needed  Patient is able to walk 4 blocks without symptoms  Patient is able to walk up 2 flights of stairs without symptoms  Significant past medical history includes BPH and history of intestinal malrotation  Tobacco use: negative   Alcohol use: negative  Illicit drug use: negative    Symptoms:   Easy bleeding: no  Easy bruising: no  Frequent nose bleeds: no  Chest pain: no  Cough: no  Dyspnea on exertion:no  Edema: no  Palpitations: no  Wheezing: no    Living situation: Patient lives with his wife in a residential home  She will be caring for him after surgery  He does not have post-op concerns  The following portions of the patient's history were reviewed and updated as appropriate: allergies, current medications, past family history, past medical history, past social history, past surgical history and problem list     Review of Systems  Review of Systems   Constitutional: Negative for activity change, appetite change, chills, diaphoresis, fatigue and fever     HENT: Negative for congestion, postnasal drip, rhinorrhea, sinus pressure, sinus pain, sneezing and sore throat  Eyes: Negative for visual disturbance  Respiratory: Negative for apnea, cough, choking, chest tightness, shortness of breath and wheezing  Cardiovascular: Negative for chest pain, palpitations and leg swelling  Gastrointestinal: Negative for abdominal distention, abdominal pain, anal bleeding, blood in stool, constipation, diarrhea, nausea and vomiting  Endocrine: Negative for cold intolerance and heat intolerance  Genitourinary: Negative for difficulty urinating, dysuria and hematuria  Musculoskeletal: Negative  Skin: Negative  Neurological: Negative for dizziness, weakness, light-headedness, numbness and headaches  Hematological: Negative for adenopathy  Psychiatric/Behavioral: Negative for agitation, sleep disturbance and suicidal ideas  All other systems reviewed and are negative  Past Medical History:   Diagnosis Date    Constipation     Mixed hyperlipidemia 1/9/2019    Spasm of abdominal muscles of left side 1/29/2020     Past Surgical History:   Procedure Laterality Date    ABDOMINAL SURGERY      BUNIONECTOMY      HERNIA REPAIR  2010    HERNIA REPAIR  2016    HERNIA REPAIR       Social History     Tobacco Use    Smoking status: Never Smoker    Smokeless tobacco: Never Used   Substance Use Topics    Alcohol use: Yes     Frequency: Monthly or less     Comment: Wine rarely    Drug use: No     Family History   Problem Relation Age of Onset    Hypertension Mother     Uterine cancer Sister      Patient has no known allergies  Current Outpatient Medications   Medication Sig Dispense Refill    Cyanocobalamin (VITAMIN B-12) 2500 MCG SUBL Place 1 tablet under the tongue daily      docusate sodium (COLACE) 100 mg capsule 1 capsule daily        finasteride (PROSCAR) 5 mg tablet Take 1 tablet (5 mg total) by mouth daily 30 tablet 11    latanoprost (XALATAN) 0 005 % ophthalmic solution       Multiple Vitamin (MULTIVITAMIN) tablet Take 1 tablet by mouth daily      Multiple Vitamins-Minerals (PRESERVISION AREDS PO) Take 1 capsule by mouth 2 (two) times a day      polyethylene glycol (MIRALAX) 17 g packet Take 17 g by mouth daily as needed (Constipation) 14 each 0    senna (SENOKOT) 8 6 mg Take 1 tablet (8 6 mg total) by mouth daily at bedtime as needed for constipation 120 each 0    timolol (TIMOPTIC) 0 5 % ophthalmic solution       Omega-3 1000 MG CAPS 1 capsule daily   Probiotic Product (PROBIOTIC-10 PO) 1 tablet daily  No current facility-administered medications for this visit  Objective:       /72 (BP Location: Left arm, Patient Position: Sitting, Cuff Size: Adult)   Pulse 60   Temp 98 2 °F (36 8 °C) (Temporal)   Ht 6' 1" (1 854 m)   Wt 70 3 kg (155 lb)   SpO2 99% Comment: RA  BMI 20 45 kg/m²   Physical Exam  Vitals signs and nursing note reviewed  Constitutional:       General: He is not in acute distress  Appearance: He is well-developed  He is not diaphoretic  HENT:      Head: Normocephalic and atraumatic  Eyes:      General: No scleral icterus  Right eye: No discharge  Left eye: No discharge  Conjunctiva/sclera: Conjunctivae normal       Pupils: Pupils are equal, round, and reactive to light  Neck:      Musculoskeletal: Normal range of motion and neck supple  Thyroid: No thyromegaly  Vascular: No JVD  Cardiovascular:      Rate and Rhythm: Normal rate and regular rhythm  Heart sounds: Normal heart sounds  No murmur  No friction rub  No gallop  Pulmonary:      Effort: Pulmonary effort is normal  No respiratory distress  Breath sounds: Normal breath sounds  No wheezing or rales  Chest:      Chest wall: No tenderness  Abdominal:      General: Bowel sounds are normal  There is no distension  Palpations: Abdomen is soft  There is no mass  Tenderness: There is no abdominal tenderness  There is no guarding or rebound     Musculoskeletal: Normal range of motion  General: No tenderness or deformity  Lymphadenopathy:      Cervical: No cervical adenopathy  Skin:     General: Skin is warm and dry  Coloration: Skin is not pale  Findings: No erythema or rash  Neurological:      Mental Status: He is alert and oriented to person, place, and time  Cranial Nerves: No cranial nerve deficit  Coordination: Coordination normal       Deep Tendon Reflexes: Reflexes are normal and symmetric  Psychiatric:         Behavior: Behavior normal          Thought Content:  Thought content normal          Judgment: Judgment normal               Cardiographics  ECG: normal sinus rhythm, no blocks or conduction defects, no ischemic changes      Lab Review   Appointment on 09/15/2020   Component Date Value    WBC 09/15/2020 4 67     RBC 09/15/2020 6 27*    Hemoglobin 09/15/2020 13 7     Hematocrit 09/15/2020 45 9     MCV 09/15/2020 73*    MCH 09/15/2020 21 9*    MCHC 09/15/2020 29 8*    RDW 09/15/2020 17 1*    MPV 09/15/2020 11 7     Platelets 41/90/6527 263     nRBC 09/15/2020 0     Neutrophils Relative 09/15/2020 67     Immat GRANS % 09/15/2020 0     Lymphocytes Relative 09/15/2020 20     Monocytes Relative 09/15/2020 9     Eosinophils Relative 09/15/2020 3     Basophils Relative 09/15/2020 1     Neutrophils Absolute 09/15/2020 3 07     Immature Grans Absolute 09/15/2020 0 01     Lymphocytes Absolute 09/15/2020 0 95     Monocytes Absolute 09/15/2020 0 44     Eosinophils Absolute 09/15/2020 0 16     Basophils Absolute 09/15/2020 0 04     Sodium 09/15/2020 141     Potassium 09/15/2020 4 0     Chloride 09/15/2020 107     CO2 09/15/2020 28     ANION GAP 09/15/2020 6     BUN 09/15/2020 17     Creatinine 09/15/2020 1 02     Glucose, Fasting 09/15/2020 83     Calcium 09/15/2020 9 5     eGFR 09/15/2020 86     Protime 09/15/2020 13 7     INR 09/15/2020 1 05     PTT 09/15/2020 30    Office Visit on 09/15/2020   Component Date Value    Ventricular Rate 09/15/2020 58     Atrial Rate 09/15/2020 58     DE Interval 09/15/2020 166     QRSD Interval 09/15/2020 94     QT Interval 09/15/2020 428     QTC Interval 09/15/2020 420     P Axis 09/15/2020 52     QRS Axis 09/15/2020 49     T Wave McGee 09/15/2020 48    Appointment on 07/28/2020   Component Date Value    ROUTING (BETA-THALASSEMI* 07/28/2020 see written report     Iron Saturation 07/28/2020 31     TIBC 07/28/2020 232*    Iron 07/28/2020 72     Ferritin 07/28/2020 142         Assessment:     69 y o  male with planned surgery as above  Known risk factors for perioperative complications: None      Cardiac Risk Estimation: low risk    Bev Hightower is cleared from a cardiovascular standpoint to proceed with surgery  he is at a low risk from a cardiovascular standpoint at this time without any additional cardiac testing  Reevaluation needed if he should present with symptoms prior to surgery  Plan:  Preoperative workup as follows none  Change in medication regimen before surgery: none, continue medication regimen including morning of surgery, with sip of water       Assessment/Plan:    Problem List Items Addressed This Visit        Musculoskeletal and Integument    Bunion of right foot       Other    History of intestinal malabsorption    Family history of thalassemia    Preop exam for internal medicine - Primary

## 2020-09-17 NOTE — LETTER
Cardiology Pre Operative Clearance      PRE OPERATIVE CARDIAC RISK ASSESSMENT    09/17/20    Shainarand Kendrickaco  1950  [id number]    Date of Surgery:  10/12/2020    Type of Surgery:  Right bunionectomy    Surgeon:  Dr Jose Pedro    No Cardiac Contraindication for Planned Surgical Procedures    Anticoagulation: no    Physician Comment:  Mr Zuleima House has been found to be low risk for cardiovascular complications for this low-intermediate risk procedure  Electronically Signed:   Marietta Lloyd MD

## 2020-10-02 ENCOUNTER — APPOINTMENT (OUTPATIENT)
Dept: LAB | Age: 70
End: 2020-10-02
Payer: COMMERCIAL

## 2020-10-02 DIAGNOSIS — Z01.818 OTHER SPECIFIED PRE-OPERATIVE EXAMINATION: ICD-10-CM

## 2020-10-02 DIAGNOSIS — Z01.812 PRE-OPERATIVE LABORATORY EXAMINATION: ICD-10-CM

## 2020-10-02 LAB
ANION GAP SERPL CALCULATED.3IONS-SCNC: 3 MMOL/L (ref 4–13)
BUN SERPL-MCNC: 13 MG/DL (ref 5–25)
CALCIUM SERPL-MCNC: 9.3 MG/DL (ref 8.3–10.1)
CHLORIDE SERPL-SCNC: 111 MMOL/L (ref 100–108)
CO2 SERPL-SCNC: 30 MMOL/L (ref 21–32)
CREAT SERPL-MCNC: 1.07 MG/DL (ref 0.6–1.3)
ERYTHROCYTE [DISTWIDTH] IN BLOOD BY AUTOMATED COUNT: 17.6 % (ref 11.6–15.1)
GFR SERPL CREATININE-BSD FRML MDRD: 80 ML/MIN/1.73SQ M
GLUCOSE P FAST SERPL-MCNC: 93 MG/DL (ref 65–99)
HCT VFR BLD AUTO: 47.6 % (ref 36.5–49.3)
HGB BLD-MCNC: 14.1 G/DL (ref 12–17)
INR PPP: 1.02 (ref 0.84–1.19)
MCH RBC QN AUTO: 21.7 PG (ref 26.8–34.3)
MCHC RBC AUTO-ENTMCNC: 29.6 G/DL (ref 31.4–37.4)
MCV RBC AUTO: 73 FL (ref 82–98)
PLATELET # BLD AUTO: 255 THOUSANDS/UL (ref 149–390)
PMV BLD AUTO: 10.5 FL (ref 8.9–12.7)
POTASSIUM SERPL-SCNC: 4.3 MMOL/L (ref 3.5–5.3)
PROTHROMBIN TIME: 13.4 SECONDS (ref 11.6–14.5)
PSA SERPL-MCNC: 2.9 NG/ML (ref 0–4)
RBC # BLD AUTO: 6.49 MILLION/UL (ref 3.88–5.62)
SODIUM SERPL-SCNC: 144 MMOL/L (ref 136–145)
WBC # BLD AUTO: 4.5 THOUSAND/UL (ref 4.31–10.16)

## 2020-10-02 PROCEDURE — G0103 PSA SCREENING: HCPCS

## 2020-10-02 PROCEDURE — 85027 COMPLETE CBC AUTOMATED: CPT

## 2020-10-02 PROCEDURE — 36415 COLL VENOUS BLD VENIPUNCTURE: CPT

## 2020-10-02 PROCEDURE — 85610 PROTHROMBIN TIME: CPT

## 2020-10-02 PROCEDURE — 80048 BASIC METABOLIC PNL TOTAL CA: CPT

## 2020-10-06 ENCOUNTER — ANESTHESIA EVENT (OUTPATIENT)
Dept: PERIOP | Facility: HOSPITAL | Age: 70
End: 2020-10-06
Payer: COMMERCIAL

## 2020-10-12 ENCOUNTER — APPOINTMENT (OUTPATIENT)
Dept: RADIOLOGY | Facility: HOSPITAL | Age: 70
End: 2020-10-12
Payer: COMMERCIAL

## 2020-10-12 ENCOUNTER — HOSPITAL ENCOUNTER (OUTPATIENT)
Facility: HOSPITAL | Age: 70
Setting detail: OUTPATIENT SURGERY
Discharge: HOME | End: 2020-10-12
Attending: PODIATRIST | Admitting: PODIATRIST
Payer: COMMERCIAL

## 2020-10-12 ENCOUNTER — ANESTHESIA (OUTPATIENT)
Dept: PERIOP | Facility: HOSPITAL | Age: 70
End: 2020-10-12
Payer: COMMERCIAL

## 2020-10-12 VITALS
SYSTOLIC BLOOD PRESSURE: 130 MMHG | TEMPERATURE: 97.7 F | WEIGHT: 155 LBS | HEIGHT: 73 IN | RESPIRATION RATE: 16 BRPM | HEART RATE: 49 BPM | OXYGEN SATURATION: 100 % | DIASTOLIC BLOOD PRESSURE: 83 MMHG | BODY MASS INDEX: 20.54 KG/M2

## 2020-10-12 VITALS — HEART RATE: 51 BPM

## 2020-10-12 DIAGNOSIS — Z98.890 POST-OPERATIVE STATE: Primary | ICD-10-CM

## 2020-10-12 PROCEDURE — C1713 ANCHOR/SCREW BN/BN,TIS/BN: HCPCS | Performed by: PODIATRIST

## 2020-10-12 PROCEDURE — 73630 X-RAY EXAM OF FOOT: CPT

## 2020-10-12 PROCEDURE — C1769 GUIDE WIRE: HCPCS | Performed by: PODIATRIST

## 2020-10-12 PROCEDURE — 73620 X-RAY EXAM OF FOOT: CPT

## 2020-10-12 DEVICE — OSTEOSYNTHESIS COMPRESSION STAPLE
Type: IMPLANTABLE DEVICE | Site: FOOT | Status: FUNCTIONAL
Brand: EASY CLIP

## 2020-10-12 DEVICE — HEADLESS SCREW
Type: IMPLANTABLE DEVICE | Site: FOOT | Status: FUNCTIONAL
Brand: MINI

## 2020-10-12 RX ORDER — MIDAZOLAM HYDROCHLORIDE 2 MG/2ML
INJECTION, SOLUTION INTRAMUSCULAR; INTRAVENOUS AS NEEDED
Status: DISCONTINUED | OUTPATIENT
Start: 2020-10-12 | End: 2020-10-12

## 2020-10-12 RX ORDER — FENTANYL CITRATE/PF 50 MCG/ML
12.5 SYRINGE (ML) INJECTION
Status: DISCONTINUED | OUTPATIENT
Start: 2020-10-12 | End: 2020-10-12 | Stop reason: HOSPADM

## 2020-10-12 RX ORDER — ONDANSETRON 2 MG/ML
INJECTION INTRAMUSCULAR; INTRAVENOUS AS NEEDED
Status: DISCONTINUED | OUTPATIENT
Start: 2020-10-12 | End: 2020-10-12

## 2020-10-12 RX ORDER — LIDOCAINE HYDROCHLORIDE 10 MG/ML
INJECTION, SOLUTION EPIDURAL; INFILTRATION; INTRACAUDAL; PERINEURAL AS NEEDED
Status: DISCONTINUED | OUTPATIENT
Start: 2020-10-12 | End: 2020-10-12

## 2020-10-12 RX ORDER — OXYCODONE HYDROCHLORIDE AND ACETAMINOPHEN 5; 325 MG/1; MG/1
1 TABLET ORAL EVERY 4 HOURS PRN
Status: DISCONTINUED | OUTPATIENT
Start: 2020-10-12 | End: 2020-10-12 | Stop reason: HOSPADM

## 2020-10-12 RX ORDER — FENTANYL CITRATE/PF 50 MCG/ML
25 SYRINGE (ML) INJECTION
Status: DISCONTINUED | OUTPATIENT
Start: 2020-10-12 | End: 2020-10-12 | Stop reason: HOSPADM

## 2020-10-12 RX ORDER — PROPOFOL 10 MG/ML
INJECTION, EMULSION INTRAVENOUS CONTINUOUS PRN
Status: DISCONTINUED | OUTPATIENT
Start: 2020-10-12 | End: 2020-10-12

## 2020-10-12 RX ORDER — FENTANYL CITRATE 50 UG/ML
INJECTION, SOLUTION INTRAMUSCULAR; INTRAVENOUS AS NEEDED
Status: DISCONTINUED | OUTPATIENT
Start: 2020-10-12 | End: 2020-10-12

## 2020-10-12 RX ORDER — DEXAMETHASONE SODIUM PHOSPHATE 4 MG/ML
INJECTION, SOLUTION INTRA-ARTICULAR; INTRALESIONAL; INTRAMUSCULAR; INTRAVENOUS; SOFT TISSUE AS NEEDED
Status: DISCONTINUED | OUTPATIENT
Start: 2020-10-12 | End: 2020-10-12

## 2020-10-12 RX ORDER — MAGNESIUM HYDROXIDE 1200 MG/15ML
LIQUID ORAL AS NEEDED
Status: DISCONTINUED | OUTPATIENT
Start: 2020-10-12 | End: 2020-10-12 | Stop reason: HOSPADM

## 2020-10-12 RX ORDER — OXYCODONE HYDROCHLORIDE AND ACETAMINOPHEN 5; 325 MG/1; MG/1
1 TABLET ORAL EVERY 4 HOURS PRN
Qty: 20 TABLET | Refills: 0 | Status: SHIPPED | OUTPATIENT
Start: 2020-10-12 | End: 2020-10-22

## 2020-10-12 RX ORDER — DEXAMETHASONE SODIUM PHOSPHATE 4 MG/ML
4 INJECTION, SOLUTION INTRA-ARTICULAR; INTRALESIONAL; INTRAMUSCULAR; INTRAVENOUS; SOFT TISSUE ONCE AS NEEDED
Status: DISCONTINUED | OUTPATIENT
Start: 2020-10-12 | End: 2020-10-12 | Stop reason: HOSPADM

## 2020-10-12 RX ORDER — SODIUM CHLORIDE, SODIUM LACTATE, POTASSIUM CHLORIDE, CALCIUM CHLORIDE 600; 310; 30; 20 MG/100ML; MG/100ML; MG/100ML; MG/100ML
75 INJECTION, SOLUTION INTRAVENOUS CONTINUOUS
Status: DISCONTINUED | OUTPATIENT
Start: 2020-10-12 | End: 2020-10-12 | Stop reason: HOSPADM

## 2020-10-12 RX ORDER — MEPERIDINE HYDROCHLORIDE 25 MG/ML
12.5 INJECTION INTRAMUSCULAR; INTRAVENOUS; SUBCUTANEOUS
Status: DISCONTINUED | OUTPATIENT
Start: 2020-10-12 | End: 2020-10-12 | Stop reason: HOSPADM

## 2020-10-12 RX ORDER — DIPHENHYDRAMINE HYDROCHLORIDE 50 MG/ML
12.5 INJECTION INTRAMUSCULAR; INTRAVENOUS ONCE AS NEEDED
Status: DISCONTINUED | OUTPATIENT
Start: 2020-10-12 | End: 2020-10-12 | Stop reason: HOSPADM

## 2020-10-12 RX ORDER — CEFAZOLIN SODIUM 1 G/50ML
1000 SOLUTION INTRAVENOUS ONCE
Status: COMPLETED | OUTPATIENT
Start: 2020-10-12 | End: 2020-10-12

## 2020-10-12 RX ORDER — BUPIVACAINE HYDROCHLORIDE 5 MG/ML
INJECTION, SOLUTION PERINEURAL AS NEEDED
Status: DISCONTINUED | OUTPATIENT
Start: 2020-10-12 | End: 2020-10-12 | Stop reason: HOSPADM

## 2020-10-12 RX ORDER — PROMETHAZINE HYDROCHLORIDE 25 MG/ML
12.5 INJECTION, SOLUTION INTRAMUSCULAR; INTRAVENOUS ONCE AS NEEDED
Status: DISCONTINUED | OUTPATIENT
Start: 2020-10-12 | End: 2020-10-12 | Stop reason: HOSPADM

## 2020-10-12 RX ORDER — PROPOFOL 10 MG/ML
INJECTION, EMULSION INTRAVENOUS AS NEEDED
Status: DISCONTINUED | OUTPATIENT
Start: 2020-10-12 | End: 2020-10-12

## 2020-10-12 RX ORDER — SODIUM CHLORIDE, SODIUM LACTATE, POTASSIUM CHLORIDE, CALCIUM CHLORIDE 600; 310; 30; 20 MG/100ML; MG/100ML; MG/100ML; MG/100ML
INJECTION, SOLUTION INTRAVENOUS CONTINUOUS PRN
Status: DISCONTINUED | OUTPATIENT
Start: 2020-10-12 | End: 2020-10-12

## 2020-10-12 RX ADMIN — SODIUM CHLORIDE, SODIUM LACTATE, POTASSIUM CHLORIDE, AND CALCIUM CHLORIDE: .6; .31; .03; .02 INJECTION, SOLUTION INTRAVENOUS at 16:46

## 2020-10-12 RX ADMIN — LIDOCAINE HYDROCHLORIDE 50 MG: 10 INJECTION, SOLUTION EPIDURAL; INFILTRATION; INTRACAUDAL; PERINEURAL at 15:27

## 2020-10-12 RX ADMIN — PROPOFOL 100 MG: 10 INJECTION, EMULSION INTRAVENOUS at 15:27

## 2020-10-12 RX ADMIN — SODIUM CHLORIDE, SODIUM LACTATE, POTASSIUM CHLORIDE, AND CALCIUM CHLORIDE: .6; .31; .03; .02 INJECTION, SOLUTION INTRAVENOUS at 15:05

## 2020-10-12 RX ADMIN — DEXAMETHASONE SODIUM PHOSPHATE 4 MG: 4 INJECTION, SOLUTION INTRA-ARTICULAR; INTRALESIONAL; INTRAMUSCULAR; INTRAVENOUS; SOFT TISSUE at 15:30

## 2020-10-12 RX ADMIN — FENTANYL CITRATE 100 MCG: 50 INJECTION INTRAMUSCULAR; INTRAVENOUS at 15:27

## 2020-10-12 RX ADMIN — MIDAZOLAM HYDROCHLORIDE 2 MG: 1 INJECTION, SOLUTION INTRAMUSCULAR; INTRAVENOUS at 15:23

## 2020-10-12 RX ADMIN — PROPOFOL 100 MCG/KG/MIN: 10 INJECTION, EMULSION INTRAVENOUS at 15:27

## 2020-10-12 RX ADMIN — ONDANSETRON HYDROCHLORIDE 4 MG: 2 INJECTION, SOLUTION INTRAMUSCULAR; INTRAVENOUS at 15:30

## 2020-10-12 RX ADMIN — CEFAZOLIN SODIUM 1000 MG: 1 SOLUTION INTRAVENOUS at 15:10

## 2020-12-15 ENCOUNTER — LAB (OUTPATIENT)
Dept: LAB | Facility: CLINIC | Age: 70
End: 2020-12-15
Payer: COMMERCIAL

## 2020-12-15 ENCOUNTER — TRANSCRIBE ORDERS (OUTPATIENT)
Dept: LAB | Facility: CLINIC | Age: 70
End: 2020-12-15

## 2020-12-15 ENCOUNTER — TELEPHONE (OUTPATIENT)
Dept: UROLOGY | Facility: MEDICAL CENTER | Age: 70
End: 2020-12-15

## 2020-12-15 DIAGNOSIS — R97.20 ELEVATED PSA: ICD-10-CM

## 2020-12-15 DIAGNOSIS — R97.20 ELEVATED PSA: Primary | ICD-10-CM

## 2020-12-15 LAB — PSA SERPL-MCNC: 3 NG/ML (ref 0–4)

## 2020-12-15 PROCEDURE — 84153 ASSAY OF PSA TOTAL: CPT

## 2020-12-21 ENCOUNTER — OFFICE VISIT (OUTPATIENT)
Dept: UROLOGY | Facility: AMBULATORY SURGERY CENTER | Age: 70
End: 2020-12-21
Payer: COMMERCIAL

## 2020-12-21 VITALS
DIASTOLIC BLOOD PRESSURE: 72 MMHG | BODY MASS INDEX: 20.86 KG/M2 | WEIGHT: 157.4 LBS | SYSTOLIC BLOOD PRESSURE: 120 MMHG | HEIGHT: 73 IN | HEART RATE: 80 BPM

## 2020-12-21 DIAGNOSIS — N40.1 BENIGN PROSTATIC HYPERPLASIA WITH URINARY FREQUENCY: Primary | ICD-10-CM

## 2020-12-21 DIAGNOSIS — R35.0 BENIGN PROSTATIC HYPERPLASIA WITH URINARY FREQUENCY: Primary | ICD-10-CM

## 2020-12-21 DIAGNOSIS — R97.20 ELEVATED PSA: ICD-10-CM

## 2020-12-21 LAB — POST-VOID RESIDUAL VOLUME, ML POC: 0 ML

## 2020-12-21 PROCEDURE — 99213 OFFICE O/P EST LOW 20 MIN: CPT | Performed by: NURSE PRACTITIONER

## 2020-12-21 PROCEDURE — 51798 US URINE CAPACITY MEASURE: CPT | Performed by: NURSE PRACTITIONER

## 2020-12-23 ENCOUNTER — OFFICE VISIT (OUTPATIENT)
Dept: INTERNAL MEDICINE CLINIC | Facility: CLINIC | Age: 70
End: 2020-12-23
Payer: COMMERCIAL

## 2020-12-23 VITALS
WEIGHT: 154.6 LBS | BODY MASS INDEX: 20.49 KG/M2 | HEIGHT: 73 IN | SYSTOLIC BLOOD PRESSURE: 128 MMHG | OXYGEN SATURATION: 99 % | TEMPERATURE: 98.2 F | DIASTOLIC BLOOD PRESSURE: 86 MMHG | HEART RATE: 79 BPM

## 2020-12-23 DIAGNOSIS — Z00.00 MEDICARE ANNUAL WELLNESS VISIT, SUBSEQUENT: ICD-10-CM

## 2020-12-23 DIAGNOSIS — R73.01 ABNORMAL FASTING GLUCOSE: ICD-10-CM

## 2020-12-23 DIAGNOSIS — R33.9 URINARY RETENTION: ICD-10-CM

## 2020-12-23 DIAGNOSIS — E78.2 MODERATE MIXED HYPERLIPIDEMIA NOT REQUIRING STATIN THERAPY: Primary | ICD-10-CM

## 2020-12-23 DIAGNOSIS — E55.9 VITAMIN D DEFICIENCY: ICD-10-CM

## 2020-12-23 DIAGNOSIS — Z13.1 SCREENING FOR DIABETES MELLITUS: ICD-10-CM

## 2020-12-23 DIAGNOSIS — Z13.6 SCREENING FOR CARDIOVASCULAR CONDITION: ICD-10-CM

## 2020-12-23 DIAGNOSIS — Z23 NEED FOR PNEUMOCOCCAL VACCINE: ICD-10-CM

## 2020-12-23 DIAGNOSIS — Z12.5 SCREENING FOR PROSTATE CANCER: ICD-10-CM

## 2020-12-23 DIAGNOSIS — Z12.11 SCREENING FOR COLORECTAL CANCER: ICD-10-CM

## 2020-12-23 DIAGNOSIS — Z87.19 HISTORY OF INTESTINAL MALABSORPTION: ICD-10-CM

## 2020-12-23 DIAGNOSIS — Z12.12 SCREENING FOR COLORECTAL CANCER: ICD-10-CM

## 2020-12-23 DIAGNOSIS — R97.20 ABNORMAL PROSTATE SPECIFIC ANTIGEN (PSA): ICD-10-CM

## 2020-12-23 PROBLEM — M21.611 BUNION OF RIGHT FOOT: Status: RESOLVED | Noted: 2020-09-17 | Resolved: 2020-12-23

## 2020-12-23 PROBLEM — Z01.818 PREOP EXAM FOR INTERNAL MEDICINE: Status: RESOLVED | Noted: 2020-09-17 | Resolved: 2020-12-23

## 2020-12-23 PROCEDURE — G0439 PPPS, SUBSEQ VISIT: HCPCS | Performed by: INTERNAL MEDICINE

## 2020-12-23 PROCEDURE — 99214 OFFICE O/P EST MOD 30 MIN: CPT | Performed by: INTERNAL MEDICINE

## 2020-12-23 PROCEDURE — 1036F TOBACCO NON-USER: CPT | Performed by: INTERNAL MEDICINE

## 2020-12-23 PROCEDURE — G0009 ADMIN PNEUMOCOCCAL VACCINE: HCPCS

## 2020-12-23 PROCEDURE — 4040F PNEUMOC VAC/ADMIN/RCVD: CPT | Performed by: INTERNAL MEDICINE

## 2020-12-23 PROCEDURE — 90732 PPSV23 VACC 2 YRS+ SUBQ/IM: CPT

## 2020-12-23 PROCEDURE — 1160F RVW MEDS BY RX/DR IN RCRD: CPT | Performed by: INTERNAL MEDICINE

## 2020-12-23 PROCEDURE — 3008F BODY MASS INDEX DOCD: CPT | Performed by: INTERNAL MEDICINE

## 2020-12-23 PROCEDURE — 3725F SCREEN DEPRESSION PERFORMED: CPT | Performed by: INTERNAL MEDICINE

## 2020-12-23 PROCEDURE — 1125F AMNT PAIN NOTED PAIN PRSNT: CPT | Performed by: INTERNAL MEDICINE

## 2020-12-23 PROCEDURE — 1170F FXNL STATUS ASSESSED: CPT | Performed by: INTERNAL MEDICINE

## 2021-01-22 ENCOUNTER — DOCTOR'S OFFICE (OUTPATIENT)
Dept: URBAN - METROPOLITAN AREA CLINIC 137 | Facility: CLINIC | Age: 71
Setting detail: OPHTHALMOLOGY
End: 2021-01-22
Payer: COMMERCIAL

## 2021-01-22 DIAGNOSIS — H25.13: ICD-10-CM

## 2021-01-22 DIAGNOSIS — H40.013: ICD-10-CM

## 2021-01-22 DIAGNOSIS — H11.153: ICD-10-CM

## 2021-01-22 DIAGNOSIS — H35.3132: ICD-10-CM

## 2021-01-22 PROBLEM — H52.03 HYPEROPIA; BOTH EYES: Status: ACTIVE | Noted: 2019-03-27

## 2021-01-22 PROBLEM — H11.151 PINGUECULA; RIGHT EYE, LEFT EYE: Status: ACTIVE | Noted: 2020-06-17

## 2021-01-22 PROBLEM — H25.12 CATARACT NUCLEAR SCLEROSIS; RIGHT EYE, LEFT EYE: Status: ACTIVE | Noted: 2020-06-17

## 2021-01-22 PROBLEM — H35.3122: Status: ACTIVE | Noted: 2020-06-17

## 2021-01-22 PROBLEM — H11.152 PINGUECULA; RIGHT EYE, LEFT EYE: Status: ACTIVE | Noted: 2020-06-17

## 2021-01-22 PROBLEM — H25.11 CATARACT NUCLEAR SCLEROSIS; RIGHT EYE, LEFT EYE: Status: ACTIVE | Noted: 2020-06-17

## 2021-01-22 PROBLEM — H35.3112: Status: ACTIVE | Noted: 2020-06-17

## 2021-01-22 PROCEDURE — 92133 CPTRZD OPH DX IMG PST SGM ON: CPT | Performed by: OPHTHALMOLOGY

## 2021-01-22 PROCEDURE — 92014 COMPRE OPH EXAM EST PT 1/>: CPT | Performed by: OPHTHALMOLOGY

## 2021-01-22 ASSESSMENT — REFRACTION_AUTOREFRACTION
OD_SPHERE: +2.25
OD_AXIS: 052
OD_CYLINDER: -0.50
OS_SPHERE: +2.00
OS_CYLINDER: -1.50
OS_AXIS: 074

## 2021-01-22 ASSESSMENT — REFRACTION_CURRENTRX
OS_AXIS: 002
OD_AXIS: 129
OD_OVR_VA: 20/
OS_OVR_VA: 20/
OD_CYLINDER: +0.25
OD_ADD: +2.25
OD_SPHERE: +1.00
OD_VPRISM_DIRECTION: PROGS
OS_CYLINDER: +0.50
OS_VPRISM_DIRECTION: PROGS
OS_ADD: +2.25
OS_SPHERE: +1.00

## 2021-01-22 ASSESSMENT — AXIALLENGTH_DERIVED
OS_AL: 23.2032
OD_AL: 23.1589
OD_AL: 22.8807
OS_AL: 23.2032

## 2021-01-22 ASSESSMENT — REFRACTION_MANIFEST
OU_VA: 20/20
OS_CYLINDER: -0.50
OS_ADD: +2.25
OD_VA1: 20/20
OS_AXIS: 090
OS_VA1: 20/20
OD_SPHERE: +1.50
OD_AXIS: 130
OD_CYLINDER: -0.50
OS_SPHERE: +1.50
OD_ADD: +2.25

## 2021-01-22 ASSESSMENT — PACHYMETRY
OS_CT_UM: 531
OD_CT_UM: 538
OD_CT_CORRECTION: 1
OS_CT_CORRECTION: 1

## 2021-01-22 ASSESSMENT — SPHEQUIV_DERIVED
OD_SPHEQUIV: 1.25
OD_SPHEQUIV: 2
OS_SPHEQUIV: 1.25
OS_SPHEQUIV: 1.25

## 2021-01-22 ASSESSMENT — KERATOMETRY
OD_AXISANGLE_DEGREES: 033
OS_AXISANGLE_DEGREES: 075
OS_K2POWER_DIOPTERS: 43.75
OD_K1POWER_DIOPTERS: 43.00
OS_K1POWER_DIOPTERS: 42.75
OD_K2POWER_DIOPTERS: 43.75

## 2021-01-22 ASSESSMENT — CONFRONTATIONAL VISUAL FIELD TEST (CVF)
OS_FINDINGS: FULL
OD_FINDINGS: FULL

## 2021-01-22 ASSESSMENT — VISUAL ACUITY
OD_BCVA: 20/25-1
OS_BCVA: 20/30-2

## 2021-01-22 ASSESSMENT — TONOMETRY: OS_IOP_MMHG: 15

## 2021-03-26 ENCOUNTER — IMMUNIZATIONS (OUTPATIENT)
Dept: FAMILY MEDICINE CLINIC | Facility: HOSPITAL | Age: 71
End: 2021-03-26

## 2021-03-26 DIAGNOSIS — Z23 ENCOUNTER FOR IMMUNIZATION: Primary | ICD-10-CM

## 2021-03-26 PROCEDURE — 0001A SARS-COV-2 / COVID-19 MRNA VACCINE (PFIZER-BIONTECH) 30 MCG: CPT

## 2021-03-26 PROCEDURE — 91300 SARS-COV-2 / COVID-19 MRNA VACCINE (PFIZER-BIONTECH) 30 MCG: CPT

## 2021-03-30 ENCOUNTER — TELEPHONE (OUTPATIENT)
Dept: UROLOGY | Facility: AMBULATORY SURGERY CENTER | Age: 71
End: 2021-03-30

## 2021-03-30 NOTE — TELEPHONE ENCOUNTER
Faxed records to [facility] copy service, on behalf of [facility] (fax# [phone]) on 3-30-21     25 pages

## 2021-04-12 DIAGNOSIS — R35.0 BENIGN PROSTATIC HYPERPLASIA WITH URINARY FREQUENCY: ICD-10-CM

## 2021-04-12 DIAGNOSIS — N40.1 BENIGN PROSTATIC HYPERPLASIA WITH URINARY FREQUENCY: ICD-10-CM

## 2021-04-12 RX ORDER — FINASTERIDE 5 MG/1
TABLET, FILM COATED ORAL
Qty: 30 TABLET | Refills: 11 | Status: SHIPPED | OUTPATIENT
Start: 2021-04-12 | End: 2022-02-21 | Stop reason: SDUPTHER

## 2021-04-19 ENCOUNTER — IMMUNIZATIONS (OUTPATIENT)
Dept: FAMILY MEDICINE CLINIC | Facility: HOSPITAL | Age: 71
End: 2021-04-19

## 2021-04-19 DIAGNOSIS — Z23 ENCOUNTER FOR IMMUNIZATION: Primary | ICD-10-CM

## 2021-04-19 PROCEDURE — 0002A SARS-COV-2 / COVID-19 MRNA VACCINE (PFIZER-BIONTECH) 30 MCG: CPT

## 2021-04-19 PROCEDURE — 91300 SARS-COV-2 / COVID-19 MRNA VACCINE (PFIZER-BIONTECH) 30 MCG: CPT

## 2021-04-23 ENCOUNTER — OFFICE VISIT (OUTPATIENT)
Dept: INTERNAL MEDICINE CLINIC | Facility: CLINIC | Age: 71
End: 2021-04-23
Payer: COMMERCIAL

## 2021-04-23 VITALS
TEMPERATURE: 94.5 F | OXYGEN SATURATION: 98 % | BODY MASS INDEX: 20.41 KG/M2 | HEART RATE: 56 BPM | WEIGHT: 154 LBS | SYSTOLIC BLOOD PRESSURE: 122 MMHG | DIASTOLIC BLOOD PRESSURE: 80 MMHG | HEIGHT: 73 IN

## 2021-04-23 DIAGNOSIS — Z87.19 HISTORY OF INTESTINAL MALABSORPTION: Primary | ICD-10-CM

## 2021-04-23 DIAGNOSIS — K59.09 OTHER CONSTIPATION: ICD-10-CM

## 2021-04-23 PROCEDURE — 99213 OFFICE O/P EST LOW 20 MIN: CPT | Performed by: INTERNAL MEDICINE

## 2021-04-23 PROCEDURE — 3008F BODY MASS INDEX DOCD: CPT | Performed by: INTERNAL MEDICINE

## 2021-04-23 PROCEDURE — 1036F TOBACCO NON-USER: CPT | Performed by: INTERNAL MEDICINE

## 2021-04-23 PROCEDURE — 1160F RVW MEDS BY RX/DR IN RCRD: CPT | Performed by: INTERNAL MEDICINE

## 2021-04-23 PROCEDURE — 3725F SCREEN DEPRESSION PERFORMED: CPT | Performed by: INTERNAL MEDICINE

## 2021-04-23 RX ORDER — DORZOLAMIDE HYDROCHLORIDE AND TIMOLOL MALEATE 20; 5 MG/ML; MG/ML
SOLUTION/ DROPS OPHTHALMIC
COMMUNITY
Start: 2021-04-19

## 2021-04-23 NOTE — ASSESSMENT & PLAN NOTE
Will trial linaclotide 145 mcg daily  Discussed potential side effects  He is to contact our office if he develops any side effects  Discussed continue high-fiber diet, adequate oral hydration, and exercise  Follow-up in 3 months

## 2021-04-23 NOTE — PROGRESS NOTES
Assessment/Plan:    Other constipation  Will trial linaclotide 145 mcg daily  Discussed potential side effects  He is to contact our office if he develops any side effects  Discussed continue high-fiber diet, adequate oral hydration, and exercise  Follow-up in 3 months  Diagnoses and all orders for this visit:    History of intestinal malabsorption  -     linaCLOtide 145 MCG CAPS; Take 1 capsule (145 mcg total) by mouth daily    Other constipation  -     linaCLOtide 145 MCG CAPS; Take 1 capsule (145 mcg total) by mouth daily    Other orders  -     dorzolamide-timolol (COSOPT) 22 3-6 8 MG/ML ophthalmic solution            Depression Screening and Follow-up Plan: Patient's depression screening was positive with a PHQ-2 score of 0  Clincally patient does not have depression  No treatment is required  Patient advised to follow-up with PCP for further management  Falls Plan of Care: balance, strength, and gait training instructions were provided  Medications that increase falls were reviewed  Vitamin D supplementation was recommended  Subjective:      Patient ID: Tejas Bardales is a 69 y o  male  Chief Complaint   Patient presents with    Constipation     C/O continued consitpation wants to discuss medication options        69year old male is seen today and is inquiring to start Linzess/linaclotide  He has chronic constipation to which he requires Colace daily  He follows a healthy diet that is high in fiber  He also drinks an adequate amount of water and exercises regularly  He has a history of Intestinal malrotation and has been battling constipation ever since  He admits to frequent episodes of abdominal cramping and pain  Constipation  This is a chronic problem  The current episode started more than 1 year ago  The problem is unchanged  His stool frequency is 1 time per day  The patient is on a high fiber diet  He exercises regularly  There has been adequate water intake  Pertinent negatives include no abdominal pain, diarrhea, difficulty urinating, fever, nausea or vomiting  He has tried stool softeners for the symptoms  The treatment provided mild relief  The following portions of the patient's history were reviewed and updated as appropriate: allergies, current medications, past family history, past medical history, past social history, past surgical history and problem list     Review of Systems   Constitutional: Negative for activity change, appetite change, chills, diaphoresis, fatigue and fever  HENT: Negative for congestion, postnasal drip, rhinorrhea, sinus pressure, sinus pain, sneezing and sore throat  Eyes: Negative for visual disturbance  Respiratory: Negative for apnea, cough, choking, chest tightness, shortness of breath and wheezing  Cardiovascular: Negative for chest pain, palpitations and leg swelling  Gastrointestinal: Negative for abdominal distention, abdominal pain, anal bleeding, blood in stool, constipation, diarrhea, nausea and vomiting  Endocrine: Negative for cold intolerance and heat intolerance  Genitourinary: Negative for difficulty urinating, dysuria and hematuria  Musculoskeletal: Negative  Skin: Negative  Neurological: Negative for dizziness, weakness, light-headedness, numbness and headaches  Hematological: Negative for adenopathy  Psychiatric/Behavioral: Negative for agitation, sleep disturbance and suicidal ideas  All other systems reviewed and are negative          Past Medical History:   Diagnosis Date    Anemia     B12 def    Bunion of right foot 9/17/2020    Constipation     Glaucoma     suspect    Mixed hyperlipidemia 1/9/2019    Spasm of abdominal muscles of left side 1/29/2020         Current Outpatient Medications:     Cyanocobalamin (VITAMIN B-12) 2500 MCG SUBL, Place 1 tablet under the tongue daily, Disp: , Rfl:     docusate sodium (COLACE) 100 mg capsule, Take 100 mg by mouth as needed , Disp: , Rfl:     dorzolamide-timolol (COSOPT) 22 3-6 8 MG/ML ophthalmic solution, , Disp: , Rfl:     finasteride (PROSCAR) 5 mg tablet, TAKE 1 TABLET BY MOUTH EVERY DAY, Disp: 30 tablet, Rfl: 11    latanoprost (XALATAN) 0 005 % ophthalmic solution, Administer 1 drop to both eyes daily at bedtime , Disp: , Rfl:     Multiple Vitamin (MULTIVITAMIN) tablet, Take 1 tablet by mouth daily, Disp: , Rfl:     Multiple Vitamins-Minerals (PRESERVISION AREDS PO), Take 1 capsule by mouth 2 (two) times a day, Disp: , Rfl:     polyethylene glycol (MIRALAX) 17 g packet, Take 17 g by mouth daily as needed (Constipation), Disp: 14 each, Rfl: 0    senna (SENOKOT) 8 6 mg, Take 1 tablet (8 6 mg total) by mouth daily at bedtime as needed for constipation, Disp: 120 each, Rfl: 0    timolol (TIMOPTIC) 0 5 % ophthalmic solution, Administer 1 drop to both eyes 2 (two) times a day , Disp: , Rfl:     linaCLOtide 145 MCG CAPS, Take 1 capsule (145 mcg total) by mouth daily, Disp: 30 capsule, Rfl: 3    No Known Allergies    Social History   Past Surgical History:   Procedure Laterality Date    ABDOMINAL SURGERY      BUNIONECTOMY      COLON SURGERY      HERNIA REPAIR  2010    HERNIA REPAIR  2016    HERNIA REPAIR      NV CORRJ HALLUX VALGUS W/SESMDC W/DIST METAR OSTEOT Right 10/12/2020    Procedure: BUNIONECTOMY, DOUBLE OSTEOTOMY;  Surgeon: Sheryle Bernard, DPM;  Location: [facility] MAIN OR;  Service: Podiatry     Family History   Problem Relation Age of Onset    Hypertension Mother     Uterine cancer Sister        Objective:  /80 (BP Location: Left arm, Patient Position: Sitting, Cuff Size: Adult)   Pulse 56   Temp (!) 94 5 °F (34 7 °C)   Ht 6' 1" (1 854 m)   Wt 69 9 kg (154 lb)   SpO2 98%   BMI 20 32 kg/m²     No results found for this or any previous visit (from the past 1344 hour(s))  Physical Exam  Vitals signs and nursing note reviewed  Constitutional:       General: He is not in acute distress       Appearance: He is well-developed  He is not diaphoretic  HENT:      Head: Normocephalic and atraumatic  Eyes:      General: No scleral icterus  Right eye: No discharge  Left eye: No discharge  Conjunctiva/sclera: Conjunctivae normal       Pupils: Pupils are equal, round, and reactive to light  Neck:      Musculoskeletal: Normal range of motion and neck supple  Thyroid: No thyromegaly  Vascular: No JVD  Cardiovascular:      Rate and Rhythm: Normal rate and regular rhythm  Heart sounds: Normal heart sounds  No murmur  No friction rub  No gallop  Pulmonary:      Effort: Pulmonary effort is normal  No respiratory distress  Breath sounds: Normal breath sounds  No wheezing or rales  Chest:      Chest wall: No tenderness  Abdominal:      General: Bowel sounds are normal  There is no distension  Palpations: Abdomen is soft  There is no mass  Tenderness: There is no abdominal tenderness  There is no guarding or rebound  Musculoskeletal: Normal range of motion  General: No tenderness or deformity  Lymphadenopathy:      Cervical: No cervical adenopathy  Skin:     General: Skin is warm and dry  Coloration: Skin is not pale  Findings: No erythema or rash  Neurological:      Mental Status: He is alert and oriented to person, place, and time  Cranial Nerves: No cranial nerve deficit  Coordination: Coordination normal       Deep Tendon Reflexes: Reflexes are normal and symmetric  Psychiatric:         Behavior: Behavior normal          Thought Content:  Thought content normal          Judgment: Judgment normal

## 2021-06-25 ENCOUNTER — APPOINTMENT (OUTPATIENT)
Dept: LAB | Facility: CLINIC | Age: 71
End: 2021-06-25
Payer: COMMERCIAL

## 2021-06-25 DIAGNOSIS — E78.2 MODERATE MIXED HYPERLIPIDEMIA NOT REQUIRING STATIN THERAPY: ICD-10-CM

## 2021-06-25 DIAGNOSIS — R73.01 ABNORMAL FASTING GLUCOSE: ICD-10-CM

## 2021-06-25 LAB
ALBUMIN SERPL BCP-MCNC: 3.6 G/DL (ref 3.5–5)
ALP SERPL-CCNC: 59 U/L (ref 46–116)
ALT SERPL W P-5'-P-CCNC: 20 U/L (ref 12–78)
ANION GAP SERPL CALCULATED.3IONS-SCNC: 1 MMOL/L (ref 4–13)
AST SERPL W P-5'-P-CCNC: 13 U/L (ref 5–45)
BILIRUB SERPL-MCNC: 0.43 MG/DL (ref 0.2–1)
BUN SERPL-MCNC: 16 MG/DL (ref 5–25)
CALCIUM SERPL-MCNC: 9 MG/DL (ref 8.3–10.1)
CHLORIDE SERPL-SCNC: 113 MMOL/L (ref 100–108)
CHOLEST SERPL-MCNC: 227 MG/DL (ref 50–200)
CO2 SERPL-SCNC: 29 MMOL/L (ref 21–32)
CREAT SERPL-MCNC: 0.95 MG/DL (ref 0.6–1.3)
ERYTHROCYTE [DISTWIDTH] IN BLOOD BY AUTOMATED COUNT: 17.6 % (ref 11.6–15.1)
EST. AVERAGE GLUCOSE BLD GHB EST-MCNC: 114 MG/DL
GFR SERPL CREATININE-BSD FRML MDRD: 93 ML/MIN/1.73SQ M
GLUCOSE P FAST SERPL-MCNC: 91 MG/DL (ref 65–99)
HBA1C MFR BLD: 5.6 %
HCT VFR BLD AUTO: 44.5 % (ref 36.5–49.3)
HDLC SERPL-MCNC: 98 MG/DL
HGB BLD-MCNC: 13.4 G/DL (ref 12–17)
LDLC SERPL CALC-MCNC: 117 MG/DL (ref 0–100)
MCH RBC QN AUTO: 22 PG (ref 26.8–34.3)
MCHC RBC AUTO-ENTMCNC: 30.1 G/DL (ref 31.4–37.4)
MCV RBC AUTO: 73 FL (ref 82–98)
NONHDLC SERPL-MCNC: 129 MG/DL
PLATELET # BLD AUTO: 246 THOUSANDS/UL (ref 149–390)
PMV BLD AUTO: 10.9 FL (ref 8.9–12.7)
POTASSIUM SERPL-SCNC: 3.8 MMOL/L (ref 3.5–5.3)
PROT SERPL-MCNC: 6.8 G/DL (ref 6.4–8.2)
RBC # BLD AUTO: 6.09 MILLION/UL (ref 3.88–5.62)
SODIUM SERPL-SCNC: 143 MMOL/L (ref 136–145)
TRIGL SERPL-MCNC: 59 MG/DL
WBC # BLD AUTO: 4.21 THOUSAND/UL (ref 4.31–10.16)

## 2021-06-25 PROCEDURE — 36415 COLL VENOUS BLD VENIPUNCTURE: CPT

## 2021-06-25 PROCEDURE — 83036 HEMOGLOBIN GLYCOSYLATED A1C: CPT

## 2021-06-25 PROCEDURE — 80053 COMPREHEN METABOLIC PANEL: CPT

## 2021-06-25 PROCEDURE — 80061 LIPID PANEL: CPT

## 2021-06-25 PROCEDURE — 85027 COMPLETE CBC AUTOMATED: CPT

## 2021-07-06 ENCOUNTER — OFFICE VISIT (OUTPATIENT)
Dept: INTERNAL MEDICINE CLINIC | Facility: CLINIC | Age: 71
End: 2021-07-06
Payer: COMMERCIAL

## 2021-07-06 VITALS
BODY MASS INDEX: 20.46 KG/M2 | RESPIRATION RATE: 18 BRPM | HEIGHT: 73 IN | DIASTOLIC BLOOD PRESSURE: 82 MMHG | OXYGEN SATURATION: 98 % | SYSTOLIC BLOOD PRESSURE: 136 MMHG | WEIGHT: 154.4 LBS | TEMPERATURE: 98.3 F | HEART RATE: 78 BPM

## 2021-07-06 DIAGNOSIS — E78.2 MODERATE MIXED HYPERLIPIDEMIA NOT REQUIRING STATIN THERAPY: ICD-10-CM

## 2021-07-06 DIAGNOSIS — D17.21 LIPOMA OF RIGHT UPPER EXTREMITY: ICD-10-CM

## 2021-07-06 DIAGNOSIS — R73.01 ABNORMAL FASTING GLUCOSE: ICD-10-CM

## 2021-07-06 DIAGNOSIS — E55.9 VITAMIN D DEFICIENCY: ICD-10-CM

## 2021-07-06 DIAGNOSIS — K59.09 OTHER CONSTIPATION: ICD-10-CM

## 2021-07-06 DIAGNOSIS — R97.20 ABNORMAL PROSTATE SPECIFIC ANTIGEN (PSA): Primary | ICD-10-CM

## 2021-07-06 DIAGNOSIS — R71.8 LOW MEAN CORPUSCULAR VOLUME (MCV): ICD-10-CM

## 2021-07-06 DIAGNOSIS — N40.1 BENIGN PROSTATIC HYPERPLASIA WITH URINARY RETENTION: ICD-10-CM

## 2021-07-06 DIAGNOSIS — R33.8 BENIGN PROSTATIC HYPERPLASIA WITH URINARY RETENTION: ICD-10-CM

## 2021-07-06 DIAGNOSIS — R33.9 URINARY RETENTION: ICD-10-CM

## 2021-07-06 DIAGNOSIS — Z83.2 FAMILY HISTORY OF THALASSEMIA: ICD-10-CM

## 2021-07-06 DIAGNOSIS — Z87.19 HISTORY OF INTESTINAL MALABSORPTION: ICD-10-CM

## 2021-07-06 PROCEDURE — 3288F FALL RISK ASSESSMENT DOCD: CPT | Performed by: INTERNAL MEDICINE

## 2021-07-06 PROCEDURE — 1101F PT FALLS ASSESS-DOCD LE1/YR: CPT | Performed by: INTERNAL MEDICINE

## 2021-07-06 PROCEDURE — 1160F RVW MEDS BY RX/DR IN RCRD: CPT | Performed by: INTERNAL MEDICINE

## 2021-07-06 PROCEDURE — 1036F TOBACCO NON-USER: CPT | Performed by: INTERNAL MEDICINE

## 2021-07-06 PROCEDURE — 3008F BODY MASS INDEX DOCD: CPT | Performed by: INTERNAL MEDICINE

## 2021-07-06 PROCEDURE — 99214 OFFICE O/P EST MOD 30 MIN: CPT | Performed by: INTERNAL MEDICINE

## 2021-07-06 NOTE — PROGRESS NOTES
Assessment/Plan:    Lipoma of right upper extremity  Continue to monitor  Other constipation  Continue linaclotide as needed  Low mean corpuscular volume (MCV)  Continue to trend CBC  Vitamin D deficiency  Continue vitamin supplementation  Moderate mixed hyperlipidemia not requiring statin therapy  Continue lifestyle modification with diet and exercise  Abnormal prostate specific antigen (PSA)  Trend PSA  Continue finasteride  Benign prostatic hyperplasia with urinary retention  Continue finasteride  Diagnoses and all orders for this visit:    Abnormal prostate specific antigen (PSA)  -     PSA, Total Screen; Future    Urinary retention  -     CBC; Future    Family history of thalassemia  -     CBC; Future  -     Comprehensive metabolic panel; Future    Abnormal fasting glucose  -     CBC; Future  -     Comprehensive metabolic panel; Future    Low mean corpuscular volume (MCV)  -     CBC; Future  -     Comprehensive metabolic panel; Future    Lipoma of right upper extremity    Other constipation    Vitamin D deficiency    History of intestinal malabsorption    Moderate mixed hyperlipidemia not requiring statin therapy    Benign prostatic hyperplasia with urinary retention            Depression Screening and Follow-up Plan: Clincally patient does not have depression  No treatment is required  Patient advised to follow-up with PCP for further management  Subjective:      Patient ID: Lidya Doe is a 69 y o  male  Chief Complaint   Patient presents with    Follow-up     6 month, labs done 6/25/21  no other issues or concerns    health maintenance     AWV after 12/23/21, fall risk       69year old male is seen today for follow up of chronic conditions  Lab studies reviewed, CBC, CMP, and A1c  Is within acceptable range  Lipid panel shows slightly elevated total cholesterol, LDL within acceptable range  He has been compliant with his medication regimen     Constipation is controlled to which he takes linaclotide as needed  He tajkes finasteride for NPH, symptoms controlled  Hyperlipidemia  This is a chronic problem  The current episode started more than 1 year ago  The problem is controlled  Recent lipid tests were reviewed and are normal  Pertinent negatives include no chest pain or shortness of breath  Current antihyperlipidemic treatment includes diet change and exercise  The current treatment provides moderate improvement of lipids  There are no compliance problems  The following portions of the patient's history were reviewed and updated as appropriate: allergies, current medications, past family history, past medical history, past social history, past surgical history and problem list     Review of Systems   Constitutional: Negative for activity change, appetite change, chills, diaphoresis, fatigue and fever  HENT: Negative for congestion, postnasal drip, rhinorrhea, sinus pressure, sinus pain, sneezing and sore throat  Eyes: Negative for visual disturbance  Respiratory: Negative for apnea, cough, choking, chest tightness, shortness of breath and wheezing  Cardiovascular: Negative for chest pain, palpitations and leg swelling  Gastrointestinal: Negative for abdominal distention, abdominal pain, anal bleeding, blood in stool, constipation, diarrhea, nausea and vomiting  Endocrine: Negative for cold intolerance and heat intolerance  Genitourinary: Negative for difficulty urinating, dysuria and hematuria  Musculoskeletal: Negative  Skin: Negative  Neurological: Negative for dizziness, weakness, light-headedness, numbness and headaches  Hematological: Negative for adenopathy  Psychiatric/Behavioral: Negative for agitation, sleep disturbance and suicidal ideas  All other systems reviewed and are negative          Past Medical History:   Diagnosis Date    Anemia     B12 def    Bunion of right foot 9/17/2020    Constipation     Glaucoma suspect    Mixed hyperlipidemia 1/9/2019    Spasm of abdominal muscles of left side 1/29/2020         Current Outpatient Medications:     linaCLOtide 145 MCG CAPS, Take 1 capsule (145 mcg total) by mouth daily (Patient taking differently: Take 1 capsule by mouth as needed ), Disp: 30 capsule, Rfl: 3    Cyanocobalamin (VITAMIN B-12) 2500 MCG SUBL, Place 1 tablet under the tongue daily, Disp: , Rfl:     docusate sodium (COLACE) 100 mg capsule, Take 100 mg by mouth as needed , Disp: , Rfl:     dorzolamide-timolol (COSOPT) 22 3-6 8 MG/ML ophthalmic solution, , Disp: , Rfl:     finasteride (PROSCAR) 5 mg tablet, TAKE 1 TABLET BY MOUTH EVERY DAY, Disp: 30 tablet, Rfl: 11    latanoprost (XALATAN) 0 005 % ophthalmic solution, Administer 1 drop to both eyes daily at bedtime , Disp: , Rfl:     Multiple Vitamin (MULTIVITAMIN) tablet, Take 1 tablet by mouth daily, Disp: , Rfl:     Multiple Vitamins-Minerals (PRESERVISION AREDS PO), Take 1 capsule by mouth 2 (two) times a day, Disp: , Rfl:     polyethylene glycol (MIRALAX) 17 g packet, Take 17 g by mouth daily as needed (Constipation), Disp: 14 each, Rfl: 0    senna (SENOKOT) 8 6 mg, Take 1 tablet (8 6 mg total) by mouth daily at bedtime as needed for constipation, Disp: 120 each, Rfl: 0    timolol (TIMOPTIC) 0 5 % ophthalmic solution, Administer 1 drop to both eyes 2 (two) times a day , Disp: , Rfl:     No Known Allergies    Social History   Past Surgical History:   Procedure Laterality Date    ABDOMINAL SURGERY      BUNIONECTOMY      COLON SURGERY      HERNIA REPAIR  2010    HERNIA REPAIR  2016    HERNIA REPAIR      SC CORRJ HALLUX VALGUS W/SESMDC W/DIST METAR OSTEOT Right 10/12/2020    Procedure: BUNIONECTOMY, DOUBLE OSTEOTOMY;  Surgeon: Luis Miguel Alberto DPM;  Location: [address] [place] [address];  Service: Podiatry     Family History   Problem Relation Age of Onset    Hypertension Mother     Uterine cancer Sister        Objective:  /82 (BP Location: Left arm, Patient Position: Sitting, Cuff Size: Standard)   Pulse 78   Temp 98 3 °F (36 8 °C) (Temporal)   Resp 18   Ht 6' 1" (1 854 m)   Wt 70 kg (154 lb 6 4 oz)   SpO2 98%   BMI 20 37 kg/m²     Recent Results (from the past 1344 hour(s))   CBC    Collection Time: 06/25/21  9:01 AM   Result Value Ref Range    WBC 4 21 (L) 4 31 - 10 16 Thousand/uL    RBC 6 09 (H) 3 88 - 5 62 Million/uL    Hemoglobin 13 4 12 0 - 17 0 g/dL    Hematocrit 44 5 36 5 - 49 3 %    MCV 73 (L) 82 - 98 fL    MCH 22 0 (L) 26 8 - 34 3 pg    MCHC 30 1 (L) 31 4 - 37 4 g/dL    RDW 17 6 (H) 11 6 - 15 1 %    Platelets 254 860 - 698 Thousands/uL    MPV 10 9 8 9 - 12 7 fL   Comprehensive metabolic panel    Collection Time: 06/25/21  9:01 AM   Result Value Ref Range    Sodium 143 136 - 145 mmol/L    Potassium 3 8 3 5 - 5 3 mmol/L    Chloride 113 (H) 100 - 108 mmol/L    CO2 29 21 - 32 mmol/L    ANION GAP 1 (L) 4 - 13 mmol/L    BUN 16 5 - 25 mg/dL    Creatinine 0 95 0 60 - 1 30 mg/dL    Glucose, Fasting 91 65 - 99 mg/dL    Calcium 9 0 8 3 - 10 1 mg/dL    AST 13 5 - 45 U/L    ALT 20 12 - 78 U/L    Alkaline Phosphatase 59 46 - 116 U/L    Total Protein 6 8 6 4 - 8 2 g/dL    Albumin 3 6 3 5 - 5 0 g/dL    Total Bilirubin 0 43 0 20 - 1 00 mg/dL    eGFR 93 ml/min/1 73sq m   Lipid panel    Collection Time: 06/25/21  9:01 AM   Result Value Ref Range    Cholesterol 227 (H) 50 - 200 mg/dL    Triglycerides 59 <=150 mg/dL    HDL, Direct 98 >=40 mg/dL    LDL Calculated 117 (H) 0 - 100 mg/dL    Non-HDL-Chol (CHOL-HDL) 129 mg/dl   Hemoglobin A1C    Collection Time: 06/25/21  9:01 AM   Result Value Ref Range    Hemoglobin A1C 5 6 Normal 3 8-5 6%; PreDiabetic 5 7-6 4%; Diabetic >=6 5%; Glycemic control for adults with diabetes <7 0% %     mg/dl            Physical Exam  Vitals and nursing note reviewed  Constitutional:       General: He is not in acute distress  Appearance: He is well-developed  He is not diaphoretic     HENT:      Head: Normocephalic and atraumatic  Eyes:      General: No scleral icterus  Right eye: No discharge  Left eye: No discharge  Conjunctiva/sclera: Conjunctivae normal       Pupils: Pupils are equal, round, and reactive to light  Neck:      Thyroid: No thyromegaly  Vascular: No JVD  Cardiovascular:      Rate and Rhythm: Normal rate and regular rhythm  Heart sounds: Normal heart sounds  No murmur heard  No friction rub  No gallop  Pulmonary:      Effort: Pulmonary effort is normal  No respiratory distress  Breath sounds: Normal breath sounds  No wheezing or rales  Chest:      Chest wall: No tenderness  Abdominal:      General: Bowel sounds are normal  There is no distension  Palpations: Abdomen is soft  There is no mass  Tenderness: There is no abdominal tenderness  There is no guarding or rebound  Musculoskeletal:         General: No tenderness or deformity  Normal range of motion  Cervical back: Normal range of motion and neck supple  Lymphadenopathy:      Cervical: No cervical adenopathy  Skin:     General: Skin is warm and dry  Coloration: Skin is not pale  Findings: No erythema or rash  Neurological:      Mental Status: He is alert and oriented to person, place, and time  Cranial Nerves: No cranial nerve deficit  Coordination: Coordination normal       Deep Tendon Reflexes: Reflexes are normal and symmetric  Psychiatric:         Behavior: Behavior normal          Thought Content:  Thought content normal          Judgment: Judgment normal

## 2021-07-22 ENCOUNTER — VBI (OUTPATIENT)
Dept: ADMINISTRATIVE | Facility: OTHER | Age: 71
End: 2021-07-22

## 2021-10-22 ENCOUNTER — TELEPHONE (OUTPATIENT)
Dept: INTERNAL MEDICINE CLINIC | Facility: CLINIC | Age: 71
End: 2021-10-22

## 2021-10-22 DIAGNOSIS — D17.21 LIPOMA OF RIGHT UPPER EXTREMITY: Primary | ICD-10-CM

## 2021-11-02 ENCOUNTER — CONSULT (OUTPATIENT)
Dept: SURGERY | Facility: CLINIC | Age: 71
End: 2021-11-02
Payer: COMMERCIAL

## 2021-11-02 ENCOUNTER — PREP FOR PROCEDURE (OUTPATIENT)
Dept: SURGERY | Facility: CLINIC | Age: 71
End: 2021-11-02

## 2021-11-02 VITALS — HEIGHT: 73 IN | WEIGHT: 150 LBS | BODY MASS INDEX: 19.88 KG/M2

## 2021-11-02 DIAGNOSIS — M79.89 MASS OF SOFT TISSUE OF UPPER ARM: ICD-10-CM

## 2021-11-02 DIAGNOSIS — R22.2 MASS ON BACK: Primary | ICD-10-CM

## 2021-11-02 DIAGNOSIS — D17.21 LIPOMA OF RIGHT UPPER EXTREMITY: ICD-10-CM

## 2021-11-02 PROCEDURE — 99202 OFFICE O/P NEW SF 15 MIN: CPT | Performed by: SURGERY

## 2021-11-08 RX ORDER — CEFAZOLIN SODIUM 1 G/50ML
1000 SOLUTION INTRAVENOUS ONCE
Status: CANCELLED | OUTPATIENT
Start: 2021-11-12

## 2021-11-09 ENCOUNTER — OFFICE VISIT (OUTPATIENT)
Dept: INTERNAL MEDICINE CLINIC | Facility: CLINIC | Age: 71
End: 2021-11-09
Payer: COMMERCIAL

## 2021-11-09 VITALS
BODY MASS INDEX: 20.2 KG/M2 | TEMPERATURE: 98.4 F | HEART RATE: 82 BPM | DIASTOLIC BLOOD PRESSURE: 72 MMHG | HEIGHT: 73 IN | SYSTOLIC BLOOD PRESSURE: 116 MMHG | OXYGEN SATURATION: 98 % | RESPIRATION RATE: 20 BRPM | WEIGHT: 152.4 LBS

## 2021-11-09 DIAGNOSIS — S46.912A LEFT SHOULDER STRAIN, INITIAL ENCOUNTER: Primary | ICD-10-CM

## 2021-11-09 PROCEDURE — 3008F BODY MASS INDEX DOCD: CPT | Performed by: INTERNAL MEDICINE

## 2021-11-09 PROCEDURE — 99213 OFFICE O/P EST LOW 20 MIN: CPT | Performed by: INTERNAL MEDICINE

## 2021-11-09 PROCEDURE — 3725F SCREEN DEPRESSION PERFORMED: CPT | Performed by: INTERNAL MEDICINE

## 2021-11-09 PROCEDURE — 1036F TOBACCO NON-USER: CPT | Performed by: INTERNAL MEDICINE

## 2021-11-09 PROCEDURE — 1160F RVW MEDS BY RX/DR IN RCRD: CPT | Performed by: INTERNAL MEDICINE

## 2021-11-12 ENCOUNTER — HOSPITAL ENCOUNTER (OUTPATIENT)
Facility: AMBULARY SURGERY CENTER | Age: 71
Setting detail: OUTPATIENT SURGERY
Discharge: HOME | End: 2021-11-12
Attending: SURGERY | Admitting: SURGERY
Payer: COMMERCIAL

## 2021-11-12 VITALS
HEIGHT: 73 IN | OXYGEN SATURATION: 98 % | TEMPERATURE: 97.8 F | BODY MASS INDEX: 20.15 KG/M2 | RESPIRATION RATE: 16 BRPM | DIASTOLIC BLOOD PRESSURE: 84 MMHG | SYSTOLIC BLOOD PRESSURE: 142 MMHG | WEIGHT: 152 LBS | HEART RATE: 53 BPM

## 2021-11-12 DIAGNOSIS — R22.2 MASS ON BACK: ICD-10-CM

## 2021-11-12 DIAGNOSIS — S46.912A LEFT SHOULDER STRAIN, INITIAL ENCOUNTER: Primary | ICD-10-CM

## 2021-11-12 DIAGNOSIS — M79.89 MASS OF SOFT TISSUE OF UPPER ARM: ICD-10-CM

## 2021-11-12 DIAGNOSIS — D17.21 LIPOMA OF RIGHT UPPER EXTREMITY: ICD-10-CM

## 2021-11-12 PROCEDURE — 88304 TISSUE EXAM BY PATHOLOGIST: CPT | Performed by: PATHOLOGY

## 2021-11-12 PROCEDURE — 24071 EXC ARM/ELBOW LES SC 3 CM/>: CPT | Performed by: SURGERY

## 2021-11-12 PROCEDURE — 21931 EXC BACK LES SC 3 CM/>: CPT | Performed by: SURGERY

## 2021-11-12 RX ORDER — MAGNESIUM HYDROXIDE 1200 MG/15ML
LIQUID ORAL AS NEEDED
Status: DISCONTINUED | OUTPATIENT
Start: 2021-11-12 | End: 2021-11-12 | Stop reason: HOSPADM

## 2021-11-12 RX ORDER — LIDOCAINE HYDROCHLORIDE 10 MG/ML
INJECTION, SOLUTION EPIDURAL; INFILTRATION; INTRACAUDAL; PERINEURAL AS NEEDED
Status: DISCONTINUED | OUTPATIENT
Start: 2021-11-12 | End: 2021-11-12 | Stop reason: HOSPADM

## 2021-11-12 RX ORDER — HYDROCODONE BITARTRATE AND ACETAMINOPHEN 5; 325 MG/1; MG/1
1 TABLET ORAL EVERY 6 HOURS PRN
Qty: 10 TABLET | Refills: 0 | Status: SHIPPED | OUTPATIENT
Start: 2021-11-12 | End: 2022-02-01

## 2021-11-29 ENCOUNTER — OFFICE VISIT (OUTPATIENT)
Dept: SURGERY | Facility: CLINIC | Age: 71
End: 2021-11-29

## 2021-11-29 DIAGNOSIS — Z48.89 POSTOPERATIVE VISIT: Primary | ICD-10-CM

## 2021-11-29 PROCEDURE — 99024 POSTOP FOLLOW-UP VISIT: CPT | Performed by: SURGERY

## 2021-12-31 ENCOUNTER — APPOINTMENT (OUTPATIENT)
Dept: LAB | Facility: CLINIC | Age: 71
End: 2021-12-31
Payer: COMMERCIAL

## 2021-12-31 DIAGNOSIS — Z83.2 FAMILY HISTORY OF THALASSEMIA: ICD-10-CM

## 2021-12-31 DIAGNOSIS — R33.9 URINARY RETENTION: ICD-10-CM

## 2021-12-31 DIAGNOSIS — R97.20 ABNORMAL PROSTATE SPECIFIC ANTIGEN (PSA): ICD-10-CM

## 2021-12-31 DIAGNOSIS — R73.01 ABNORMAL FASTING GLUCOSE: ICD-10-CM

## 2021-12-31 DIAGNOSIS — R71.8 LOW MEAN CORPUSCULAR VOLUME (MCV): ICD-10-CM

## 2021-12-31 LAB
ALBUMIN SERPL BCP-MCNC: 3.8 G/DL (ref 3.5–5)
ALP SERPL-CCNC: 65 U/L (ref 46–116)
ALT SERPL W P-5'-P-CCNC: 29 U/L (ref 12–78)
ANION GAP SERPL CALCULATED.3IONS-SCNC: 2 MMOL/L (ref 4–13)
AST SERPL W P-5'-P-CCNC: 14 U/L (ref 5–45)
BILIRUB SERPL-MCNC: 0.43 MG/DL (ref 0.2–1)
BUN SERPL-MCNC: 16 MG/DL (ref 5–25)
CALCIUM SERPL-MCNC: 9.1 MG/DL (ref 8.3–10.1)
CHLORIDE SERPL-SCNC: 112 MMOL/L (ref 100–108)
CO2 SERPL-SCNC: 27 MMOL/L (ref 21–32)
CREAT SERPL-MCNC: 1.16 MG/DL (ref 0.6–1.3)
ERYTHROCYTE [DISTWIDTH] IN BLOOD BY AUTOMATED COUNT: 18.3 % (ref 11.6–15.1)
GFR SERPL CREATININE-BSD FRML MDRD: 62 ML/MIN/1.73SQ M
GLUCOSE P FAST SERPL-MCNC: 88 MG/DL (ref 65–99)
HCT VFR BLD AUTO: 44.9 % (ref 36.5–49.3)
HGB BLD-MCNC: 13.6 G/DL (ref 12–17)
MCH RBC QN AUTO: 22 PG (ref 26.8–34.3)
MCHC RBC AUTO-ENTMCNC: 30.3 G/DL (ref 31.4–37.4)
MCV RBC AUTO: 73 FL (ref 82–98)
PLATELET # BLD AUTO: 277 THOUSANDS/UL (ref 149–390)
PMV BLD AUTO: 11 FL (ref 8.9–12.7)
POTASSIUM SERPL-SCNC: 3.9 MMOL/L (ref 3.5–5.3)
PROT SERPL-MCNC: 7 G/DL (ref 6.4–8.2)
PSA SERPL-MCNC: 2.7 NG/ML (ref 0–4)
RBC # BLD AUTO: 6.19 MILLION/UL (ref 3.88–5.62)
SODIUM SERPL-SCNC: 141 MMOL/L (ref 136–145)
WBC # BLD AUTO: 5.15 THOUSAND/UL (ref 4.31–10.16)

## 2021-12-31 PROCEDURE — 85027 COMPLETE CBC AUTOMATED: CPT

## 2021-12-31 PROCEDURE — G0103 PSA SCREENING: HCPCS

## 2021-12-31 PROCEDURE — 36415 COLL VENOUS BLD VENIPUNCTURE: CPT

## 2021-12-31 PROCEDURE — 80053 COMPREHEN METABOLIC PANEL: CPT

## 2022-01-12 ENCOUNTER — OFFICE VISIT (OUTPATIENT)
Dept: UROLOGY | Facility: AMBULATORY SURGERY CENTER | Age: 72
End: 2022-01-12
Payer: COMMERCIAL

## 2022-01-12 VITALS
BODY MASS INDEX: 20.41 KG/M2 | SYSTOLIC BLOOD PRESSURE: 110 MMHG | HEIGHT: 73 IN | DIASTOLIC BLOOD PRESSURE: 64 MMHG | WEIGHT: 154 LBS

## 2022-01-12 DIAGNOSIS — R97.20 ELEVATED PSA: Primary | ICD-10-CM

## 2022-01-12 PROCEDURE — 3008F BODY MASS INDEX DOCD: CPT | Performed by: NURSE PRACTITIONER

## 2022-01-12 PROCEDURE — 99213 OFFICE O/P EST LOW 20 MIN: CPT | Performed by: NURSE PRACTITIONER

## 2022-01-12 PROCEDURE — 1160F RVW MEDS BY RX/DR IN RCRD: CPT | Performed by: NURSE PRACTITIONER

## 2022-01-12 PROCEDURE — 1036F TOBACCO NON-USER: CPT | Performed by: NURSE PRACTITIONER

## 2022-01-12 NOTE — PROGRESS NOTES
1/12/2022      Chief Complaint   Patient presents with    Follow-up     Assessment and Plan    66 y o  male managed by Dr Chantelle Landry    1  Elevated PSA  · Status post negative prostate biopsy 2008  · PSA performed 12/31/2021 resulted 2 7  · YULIA-very large smooth prostate with no nodules  · Repeat PSA and YULIA in 1 year    2  Benign prostatic hyperplasia  · Continue finasteride  · Maintain adequate hydration upwards to 40 oz of water intake per day while avoiding bladder irritating foods beverages  · Continue to monitor for worsening/progression of urinary symptoms        History of Present Illness  Billie Baum is a 66 y o  male here for follow up evaluation of  elevated PSA and urinary symptoms secondary benign prostatic hyperplasia  He is currently managed on finasteride  He has a history of a negative prostate biopsy performed removed Saleem Vásquez 4/2008  At that time he was noted to have a prostate size greater than 110 mL  PSA trend below  Component PSA, Total   Latest Ref Rng & Units 0 0 - 4 0 ng/mL   3/18/2019 5 1 (H)   10/7/2019 5 2 (H)   4/8/2020 3 3   10/2/2020 2 9   12/15/2020 3 0   12/31/2021 2 7       Review of Systems   Constitutional: Negative for chills and fever  Respiratory: Negative for cough and shortness of breath  Cardiovascular: Negative for chest pain  Gastrointestinal: Negative for abdominal distention, abdominal pain, blood in stool, nausea and vomiting  Genitourinary: Negative for difficulty urinating, dysuria, enuresis, flank pain, frequency, hematuria and urgency  Skin: Negative for rash         Past Medical History  Past Medical History:   Diagnosis Date    Anemia     B12 def    Bunion of right foot 9/17/2020    Constipation     Glaucoma     suspect    Mixed hyperlipidemia 1/9/2019    Spasm of abdominal muscles of left side 1/29/2020       Past Social History  Past Surgical History:   Procedure Laterality Date    ABDOMINAL SURGERY      "intestine surgery"     BUNIONECTOMY      COLONOSCOPY      HERNIA REPAIR  2010    HERNIA REPAIR  2016    HERNIA REPAIR      [place] CORRJ HALLUX VALGUS W/SESMDC W/DIST Aleks Kobitti Right 10/12/2020    Procedure: BUNIONECTOMY, DOUBLE OSTEOTOMY;  Surgeon: Joe Mejias DPM;  Location: [address] [place] [address] MAIN OR;  Service: Podiatry    IL EXCISION TUMOR SOFT TISSUE BACK/FLANK SUBQ 3+CM N/A 11/12/2021    Procedure: EXCISION  BIOPSY LESION/MASS BACK;  Surgeon: Mariia Soto MD;  Location: [facility];  Service: General    IL EXCISION TUMOR SOFT TISSUE SHOULDER SUBQ 3+CM Right 11/12/2021    Procedure: EXCISION BIOPSY TISSUE LESION/MASS UPPER EXTREMITY;  Surgeon: Mariia Soto MD;  Location: [facility];  Service: General     Social History     Tobacco Use   Smoking Status Never Smoker   Smokeless Tobacco Never Used       Past Family History  Family History   Problem Relation Age of Onset    Hypertension Mother     Uterine cancer Sister        Past Social history  Social History     Socioeconomic History    Marital status: /Civil Union     Spouse name: Not on file    Number of children: Not on file    Years of education: Not on file    Highest education level: Not on file   Occupational History    Not on file   Tobacco Use    Smoking status: Never Smoker    Smokeless tobacco: Never Used   Vaping Use    Vaping Use: Never used   Substance and Sexual Activity    Alcohol use: Not Currently    Drug use: Never    Sexual activity: Yes     Partners: Female   Other Topics Concern    Not on file   Social History Narrative    Not on file     Social Determinants of Health     Financial Resource Strain: Not on file   Food Insecurity: Not on file   Transportation Needs: Not on file   Physical Activity: Not on file   Stress: Not on file   Social Connections: Not on file   Intimate Partner Violence: Not on file   Housing Stability: Not on file       Current Medications  Current Outpatient Medications   Medication Sig Dispense Refill    Cyanocobalamin (VITAMIN B-12) 2500 MCG SUBL Place 1 tablet under the tongue daily      docusate sodium (COLACE) 100 mg capsule Take 100 mg by mouth as needed       dorzolamide-timolol (COSOPT) 22 3-6 8 MG/ML ophthalmic solution       finasteride (PROSCAR) 5 mg tablet TAKE 1 TABLET BY MOUTH EVERY DAY 30 tablet 11    HYDROcodone-acetaminophen (NORCO) 5-325 mg per tablet Take 1 tablet by mouth every 6 (six) hours as needed for pain for up to 10 doses Max Daily Amount: 4 tablets 10 tablet 0    latanoprost (XALATAN) 0 005 % ophthalmic solution Administer 1 drop to both eyes daily at bedtime       linaCLOtide 145 MCG CAPS Take 1 capsule (145 mcg total) by mouth daily (Patient taking differently: Take 1 capsule by mouth as needed ) 30 capsule 3    Multiple Vitamin (MULTIVITAMIN) tablet Take 1 tablet by mouth daily      Multiple Vitamins-Minerals (PRESERVISION AREDS PO) Take 1 capsule by mouth 2 (two) times a day      polyethylene glycol (MIRALAX) 17 g packet Take 17 g by mouth daily as needed (Constipation) 14 each 0    senna (SENOKOT) 8 6 mg Take 1 tablet (8 6 mg total) by mouth daily at bedtime as needed for constipation 120 each 0    timolol (TIMOPTIC) 0 5 % ophthalmic solution Administer 1 drop to both eyes 2 (two) times a day        No current facility-administered medications for this visit  Allergies  No Known Allergies      The following portions of the patient's history were reviewed and updated as appropriate: allergies, current medications, past medical history, past social history, past surgical history and problem list       Vitals  Vitals:    01/12/22 1255   BP: 110/64   Weight: 69 9 kg (154 lb)   Height: 6' 1" (1 854 m)     Physical Exam  Physical Exam  Vitals reviewed  Constitutional:       General: He is not in acute distress  Appearance: Normal appearance  He is normal weight  HENT:      Head: Normocephalic  Pulmonary:      Effort: No respiratory distress        Breath sounds: Normal breath sounds  Genitourinary:     Comments: Very large smooth prostate with no nodules noted  Skin:     General: Skin is warm and dry  Neurological:      General: No focal deficit present  Mental Status: He is alert and oriented to person, place, and time     Psychiatric:         Mood and Affect: Mood normal          Behavior: Behavior normal        Results  No results found for this or any previous visit (from the past 1 hour(s)) ]  Lab Results   Component Value Date    PSA 2 7 12/31/2021    PSA 3 0 12/15/2020    PSA 2 9 10/02/2020     Lab Results   Component Value Date    CALCIUM 9 1 12/31/2021    K 3 9 12/31/2021    CO2 27 12/31/2021     (H) 12/31/2021    BUN 16 12/31/2021    CREATININE 1 16 12/31/2021     Lab Results   Component Value Date    WBC 5 15 12/31/2021    HGB 13 6 12/31/2021    HCT 44 9 12/31/2021    MCV 73 (L) 12/31/2021     12/31/2021     Orders  Orders Placed This Encounter   Procedures    PSA Total, Diagnostic     Standing Status:   Future     Standing Expiration Date:   1/12/2023       SUE Khalil

## 2022-02-01 ENCOUNTER — OFFICE VISIT (OUTPATIENT)
Dept: INTERNAL MEDICINE CLINIC | Facility: OTHER | Age: 72
End: 2022-02-01
Payer: COMMERCIAL

## 2022-02-01 VITALS
RESPIRATION RATE: 18 BRPM | HEIGHT: 73 IN | BODY MASS INDEX: 20.25 KG/M2 | WEIGHT: 152.8 LBS | SYSTOLIC BLOOD PRESSURE: 114 MMHG | TEMPERATURE: 98.7 F | DIASTOLIC BLOOD PRESSURE: 62 MMHG | HEART RATE: 57 BPM | OXYGEN SATURATION: 99 %

## 2022-02-01 DIAGNOSIS — N40.1 BENIGN PROSTATIC HYPERPLASIA WITH URINARY RETENTION: ICD-10-CM

## 2022-02-01 DIAGNOSIS — R33.8 BENIGN PROSTATIC HYPERPLASIA WITH URINARY RETENTION: ICD-10-CM

## 2022-02-01 DIAGNOSIS — R97.20 ABNORMAL PROSTATE SPECIFIC ANTIGEN (PSA): ICD-10-CM

## 2022-02-01 DIAGNOSIS — Z00.00 MEDICARE ANNUAL WELLNESS VISIT, SUBSEQUENT: ICD-10-CM

## 2022-02-01 DIAGNOSIS — E78.2 MODERATE MIXED HYPERLIPIDEMIA NOT REQUIRING STATIN THERAPY: Primary | ICD-10-CM

## 2022-02-01 DIAGNOSIS — Z12.11 SCREENING FOR COLORECTAL CANCER: ICD-10-CM

## 2022-02-01 DIAGNOSIS — E55.9 VITAMIN D DEFICIENCY: ICD-10-CM

## 2022-02-01 DIAGNOSIS — Z12.5 SCREENING FOR PROSTATE CANCER: ICD-10-CM

## 2022-02-01 DIAGNOSIS — Z13.6 SCREENING FOR CARDIOVASCULAR CONDITION: ICD-10-CM

## 2022-02-01 DIAGNOSIS — K59.09 OTHER CONSTIPATION: ICD-10-CM

## 2022-02-01 DIAGNOSIS — Z12.12 SCREENING FOR COLORECTAL CANCER: ICD-10-CM

## 2022-02-01 DIAGNOSIS — Z13.1 SCREENING FOR DIABETES MELLITUS: ICD-10-CM

## 2022-02-01 PROBLEM — Z48.89 POSTOPERATIVE VISIT: Status: RESOLVED | Noted: 2021-11-29 | Resolved: 2022-02-01

## 2022-02-01 PROCEDURE — 1036F TOBACCO NON-USER: CPT | Performed by: INTERNAL MEDICINE

## 2022-02-01 PROCEDURE — 1160F RVW MEDS BY RX/DR IN RCRD: CPT | Performed by: INTERNAL MEDICINE

## 2022-02-01 PROCEDURE — 99214 OFFICE O/P EST MOD 30 MIN: CPT | Performed by: INTERNAL MEDICINE

## 2022-02-01 PROCEDURE — G0439 PPPS, SUBSEQ VISIT: HCPCS | Performed by: INTERNAL MEDICINE

## 2022-02-01 PROCEDURE — 3288F FALL RISK ASSESSMENT DOCD: CPT | Performed by: INTERNAL MEDICINE

## 2022-02-01 PROCEDURE — 3008F BODY MASS INDEX DOCD: CPT | Performed by: INTERNAL MEDICINE

## 2022-02-01 PROCEDURE — 1125F AMNT PAIN NOTED PAIN PRSNT: CPT | Performed by: INTERNAL MEDICINE

## 2022-02-01 PROCEDURE — 1170F FXNL STATUS ASSESSED: CPT | Performed by: INTERNAL MEDICINE

## 2022-02-01 NOTE — PROGRESS NOTES
Assessment/Plan:    Vitamin D deficiency  Continue vitamin supplementation  Other constipation  Continue current bowel regimen  Currently stable and controlled  Moderate mixed hyperlipidemia not requiring statin therapy  Continue diet and exercise/  Monitor off statin therapy  Benign prostatic hyperplasia with urinary retention  Stable, asymptomatic  Continue follow up with urology and continue finasteride  Diagnoses and all orders for this visit:    Moderate mixed hyperlipidemia not requiring statin therapy  -     Lipid panel; Future    Benign prostatic hyperplasia with urinary retention    Vitamin D deficiency    Other constipation    Medicare annual wellness visit, subsequent    Screening for diabetes mellitus    Screening for cardiovascular condition    Screening for colorectal cancer    Screening for prostate cancer    Abnormal prostate specific antigen (PSA)            Depression Screening and Follow-up Plan: Patient was screened for depression during today's encounter  They screened negative with a PHQ-2 score of 0  Falls Plan of Care: balance, strength, and gait training instructions were provided  Medications that increase falls were reviewed  Vitamin D supplementation was recommended  Subjective:      Patient ID: Wanda Jacques is a 66 y o  male  Chief Complaint   Patient presents with    Follow-up     6 month, labs done 12/31/21   Medicare Wellness Visit    health maintenance     nothing due    Nasal Congestion     when he gets a cold he gets a mucus stuck in the back of his throat and he has trouble breathing just wanted to talk to you about it       78-year-old male seen today for follow-up of chronic conditions  Laboratory studies reviewed today, CBC remained stable with low MCV  CMP is within acceptable range  PSA is stable at 2 7  He follows up with his urologist regularly  He denies any symptomatology at this time    He recently underwent lipoma resection has recovered well  He has no active complaints or concerns at this time  He follows a healthy diet and exercise regimen  Hyperlipidemia  This is a chronic problem  The current episode started more than 1 year ago  The problem is controlled  Recent lipid tests were reviewed and are normal  Pertinent negatives include no chest pain or shortness of breath  Current antihyperlipidemic treatment includes diet change and exercise  The current treatment provides moderate improvement of lipids  There are no compliance problems  The following portions of the patient's history were reviewed and updated as appropriate: allergies, current medications, past family history, past medical history, past social history, past surgical history and problem list     Review of Systems   Constitutional: Negative for activity change, appetite change, chills, diaphoresis, fatigue and fever  HENT: Negative for congestion, postnasal drip, rhinorrhea, sinus pressure, sinus pain, sneezing and sore throat  Eyes: Negative for visual disturbance  Respiratory: Negative for apnea, cough, choking, chest tightness, shortness of breath and wheezing  Cardiovascular: Negative for chest pain, palpitations and leg swelling  Gastrointestinal: Negative for abdominal distention, abdominal pain, anal bleeding, blood in stool, constipation, diarrhea, nausea and vomiting  Endocrine: Negative for cold intolerance and heat intolerance  Genitourinary: Negative for difficulty urinating, dysuria and hematuria  Musculoskeletal: Negative  Skin: Negative  Neurological: Negative for dizziness, weakness, light-headedness, numbness and headaches  Hematological: Negative for adenopathy  Psychiatric/Behavioral: Negative for agitation, sleep disturbance and suicidal ideas  All other systems reviewed and are negative          Past Medical History:   Diagnosis Date    Anemia     B12 def    Bunion of right foot 9/17/2020    Constipation     Glaucoma     suspect    Mixed hyperlipidemia 1/9/2019    Spasm of abdominal muscles of left side 1/29/2020         Current Outpatient Medications:     Cyanocobalamin (VITAMIN B-12) 2500 MCG SUBL, Place 1 tablet under the tongue daily, Disp: , Rfl:     docusate sodium (COLACE) 100 mg capsule, Take 100 mg by mouth as needed , Disp: , Rfl:     dorzolamide-timolol (COSOPT) 22 3-6 8 MG/ML ophthalmic solution, , Disp: , Rfl:     finasteride (PROSCAR) 5 mg tablet, TAKE 1 TABLET BY MOUTH EVERY DAY, Disp: 30 tablet, Rfl: 11    latanoprost (XALATAN) 0 005 % ophthalmic solution, Administer 1 drop to both eyes daily at bedtime , Disp: , Rfl:     linaCLOtide 145 MCG CAPS, Take 1 capsule (145 mcg total) by mouth daily (Patient taking differently: Take 1 capsule by mouth as needed ), Disp: 30 capsule, Rfl: 3    Multiple Vitamin (MULTIVITAMIN) tablet, Take 1 tablet by mouth daily, Disp: , Rfl:     Multiple Vitamins-Minerals (PRESERVISION AREDS PO), Take 1 capsule by mouth 2 (two) times a day, Disp: , Rfl:     polyethylene glycol (MIRALAX) 17 g packet, Take 17 g by mouth daily as needed (Constipation), Disp: 14 each, Rfl: 0    senna (SENOKOT) 8 6 mg, Take 1 tablet (8 6 mg total) by mouth daily at bedtime as needed for constipation, Disp: 120 each, Rfl: 0    timolol (TIMOPTIC) 0 5 % ophthalmic solution, Administer 1 drop to both eyes 2 (two) times a day , Disp: , Rfl:     No Known Allergies    Social History   Past Surgical History:   Procedure Laterality Date    ABDOMINAL SURGERY      "intestine surgery"     BUNIONECTOMY      COLONOSCOPY      HERNIA REPAIR  2010    HERNIA REPAIR  2016    HERNIA REPAIR      [place] Yvonnanthony W/SESMDC W/DIST Nehemiah Si Right 10/12/2020    Procedure: BUNIONECTOMY, DOUBLE OSTEOTOMY;  Surgeon: Mariella Krishnamurthy DPM;  Location: [address] [place] [address];  Service: Podiatry    AK EXCISION TUMOR SOFT TISSUE BACK/FLANK SUBQ 3+CM N/A 11/12/2021    Procedure: EXCISION  BIOPSY LESION/MASS BACK;  Surgeon: Diane Thompson MD;  [facility];  Service: General    [place] EXCISION TUMOR SOFT TISSUE SHOULDER SUBQ 3+CM Right 11/12/2021    Procedure: EXCISION BIOPSY TISSUE LESION/MASS UPPER EXTREMITY;  Surgeon: Diane Thompson MD;  Location: [facility];  Service: General     Family History   Problem Relation Age of Onset    Hypertension Mother     Uterine cancer Sister        Objective:  /62 (BP Location: Left arm, Patient Position: Sitting, Cuff Size: Standard)   Pulse 57   Temp 98 7 °F (37 1 °C) (Temporal)   Resp 18   Ht 6' 1" (1 854 m)   Wt 69 3 kg (152 lb 12 8 oz)   SpO2 99%   BMI 20 16 kg/m²     Recent Results (from the past 1344 hour(s))   PSA, Total Screen    Collection Time: 12/31/21  8:03 AM   Result Value Ref Range    PSA 2 7 0 0 - 4 0 ng/mL   CBC    Collection Time: 12/31/21  8:03 AM   Result Value Ref Range    WBC 5 15 4 31 - 10 16 Thousand/uL    RBC 6 19 (H) 3 88 - 5 62 Million/uL    Hemoglobin 13 6 12 0 - 17 0 g/dL    Hematocrit 44 9 36 5 - 49 3 %    MCV 73 (L) 82 - 98 fL    MCH 22 0 (L) 26 8 - 34 3 pg    MCHC 30 3 (L) 31 4 - 37 4 g/dL    RDW 18 3 (H) 11 6 - 15 1 %    Platelets 803 578 - 544 Thousands/uL    MPV 11 0 8 9 - 12 7 fL   Comprehensive metabolic panel    Collection Time: 12/31/21  8:03 AM   Result Value Ref Range    Sodium 141 136 - 145 mmol/L    Potassium 3 9 3 5 - 5 3 mmol/L    Chloride 112 (H) 100 - 108 mmol/L    CO2 27 21 - 32 mmol/L    ANION GAP 2 (L) 4 - 13 mmol/L    BUN 16 5 - 25 mg/dL    Creatinine 1 16 0 60 - 1 30 mg/dL    Glucose, Fasting 88 65 - 99 mg/dL    Calcium 9 1 8 3 - 10 1 mg/dL    AST 14 5 - 45 U/L    ALT 29 12 - 78 U/L    Alkaline Phosphatase 65 46 - 116 U/L    Total Protein 7 0 6 4 - 8 2 g/dL    Albumin 3 8 3 5 - 5 0 g/dL    Total Bilirubin 0 43 0 20 - 1 00 mg/dL    eGFR 62 ml/min/1 73sq m            Physical Exam  Vitals and nursing note reviewed  Constitutional:       General: He is not in acute distress       Appearance: He is well-developed  He is not diaphoretic  HENT:      Head: Normocephalic and atraumatic  Eyes:      General: No scleral icterus  Right eye: No discharge  Left eye: No discharge  Conjunctiva/sclera: Conjunctivae normal       Pupils: Pupils are equal, round, and reactive to light  Neck:      Thyroid: No thyromegaly  Vascular: No JVD  Cardiovascular:      Rate and Rhythm: Normal rate and regular rhythm  Heart sounds: Normal heart sounds  No murmur heard  No friction rub  No gallop  Pulmonary:      Effort: Pulmonary effort is normal  No respiratory distress  Breath sounds: Normal breath sounds  No wheezing or rales  Chest:      Chest wall: No tenderness  Abdominal:      General: Bowel sounds are normal  There is no distension  Palpations: Abdomen is soft  There is no mass  Tenderness: There is no abdominal tenderness  There is no guarding or rebound  Musculoskeletal:         General: No tenderness or deformity  Normal range of motion  Cervical back: Normal range of motion and neck supple  Lymphadenopathy:      Cervical: No cervical adenopathy  Skin:     General: Skin is warm and dry  Coloration: Skin is not pale  Findings: No erythema or rash  Neurological:      Mental Status: He is alert and oriented to person, place, and time  Cranial Nerves: No cranial nerve deficit  Coordination: Coordination normal       Deep Tendon Reflexes: Reflexes are normal and symmetric  Psychiatric:         Behavior: Behavior normal          Thought Content:  Thought content normal          Judgment: Judgment normal

## 2022-02-01 NOTE — PROGRESS NOTES
Assessment and Plan:     Problem List Items Addressed This Visit        Genitourinary    Benign prostatic hyperplasia with urinary retention     Stable, asymptomatic  Continue follow up with urology and continue finasteride  Other    Moderate mixed hyperlipidemia not requiring statin therapy - Primary     Continue diet and exercise/  Monitor off statin therapy  Relevant Orders    Lipid panel    Vitamin D deficiency     Continue vitamin supplementation  Other constipation     Continue current bowel regimen  Currently stable and controlled  Other Visit Diagnoses     Medicare annual wellness visit, subsequent        Screening for diabetes mellitus        Screening for cardiovascular condition        Screening for colorectal cancer        Screening for prostate cancer              Depression Screening and Follow-up Plan: Patient was screened for depression during today's encounter  They screened negative with a PHQ-2 score of 0  Preventive health issues were discussed with patient, and age appropriate screening tests were ordered as noted in patient's After Visit Summary  Personalized health advice and appropriate referrals for health education or preventive services given if needed, as noted in patient's After Visit Summary       History of Present Illness:     Patient presents for Medicare Annual Wellness visit    Patient Care Team:  Jose Manuel Porras MD as PCP - General (Internal Medicine)  Jose Manuel Porras MD as PCP - [address] [place] [address] (RTE)     Problem List:     Patient Active Problem List   Diagnosis    Benign prostatic hyperplasia with urinary retention    Abnormal prostate specific antigen (PSA)    Moderate mixed hyperlipidemia not requiring statin therapy    Inguinal hernia    History of intestinal malabsorption    Encounter for colorectal cancer screening    Vitamin D deficiency    Low mean corpuscular volume (MCV)    Family history of thalassemia    Other constipation    Lipoma of right upper extremity    Left shoulder strain, initial encounter      Past Medical and Surgical History:     Past Medical History:   Diagnosis Date    Anemia     B12 def    Bunion of right foot 9/17/2020    Constipation     Glaucoma     suspect    Mixed hyperlipidemia 1/9/2019    Spasm of abdominal muscles of left side 1/29/2020     Past Surgical History:   Procedure Laterality Date    ABDOMINAL SURGERY      "intestine surgery"     BUNIONECTOMY      COLONOSCOPY      HERNIA REPAIR  2010    HERNIA REPAIR  2016    HERNIA REPAIR      CT Yvonnadrianaire W/SESMDC W/DIST Aleks Neha Right 10/12/2020    Procedure: BUNIONECTOMY, DOUBLE OSTEOTOMY;  Surgeon: Joe Mejias DPM;  Location: [address] [place] [address] MAIN OR;  Service: Podiatry    CT EXCISION TUMOR SOFT TISSUE BACK/FLANK SUBQ 3+CM N/A 11/12/2021    Procedure: EXCISION  BIOPSY LESION/MASS BACK;  Surgeon: Mariia Soto MD;  [facility]: [facility];  Service: General    CT EXCISION TUMOR SOFT TISSUE SHOULDER SUBQ 3+CM Right 11/12/2021    Procedure: EXCISION BIOPSY TISSUE LESION/MASS UPPER EXTREMITY;  Surgeon: Mariia Soto MD;  [facility];  Service: General      Family History:     Family History   Problem Relation Age of Onset    Hypertension Mother     Uterine cancer Sister       Social History:     Social History     Socioeconomic History    Marital status: /Civil Union     Spouse name: None    Number of children: None    Years of education: None    Highest education level: None   Occupational History    None   Tobacco Use    Smoking status: Never Smoker    Smokeless tobacco: Never Used   Vaping Use    Vaping Use: Never used   Substance and Sexual Activity    Alcohol use: Not Currently    Drug use: Never    Sexual activity: Yes     Partners: Female   Other Topics Concern    None   Social History Narrative    None     Social Determinants of Health     Financial Resource Strain: Not on file   Food Insecurity: Not on file   Transportation Needs: Not on file   Physical Activity: Not on file   Stress: Not on file   Social Connections: Not on file   Intimate Partner Violence: Not on file   Housing Stability: Not on file      Medications and Allergies:     Current Outpatient Medications   Medication Sig Dispense Refill    Cyanocobalamin (VITAMIN B-12) 2500 MCG SUBL Place 1 tablet under the tongue daily      docusate sodium (COLACE) 100 mg capsule Take 100 mg by mouth as needed       dorzolamide-timolol (COSOPT) 22 3-6 8 MG/ML ophthalmic solution       finasteride (PROSCAR) 5 mg tablet TAKE 1 TABLET BY MOUTH EVERY DAY 30 tablet 11    latanoprost (XALATAN) 0 005 % ophthalmic solution Administer 1 drop to both eyes daily at bedtime       linaCLOtide 145 MCG CAPS Take 1 capsule (145 mcg total) by mouth daily (Patient taking differently: Take 1 capsule by mouth as needed ) 30 capsule 3    Multiple Vitamin (MULTIVITAMIN) tablet Take 1 tablet by mouth daily      Multiple Vitamins-Minerals (PRESERVISION AREDS PO) Take 1 capsule by mouth 2 (two) times a day      polyethylene glycol (MIRALAX) 17 g packet Take 17 g by mouth daily as needed (Constipation) 14 each 0    senna (SENOKOT) 8 6 mg Take 1 tablet (8 6 mg total) by mouth daily at bedtime as needed for constipation 120 each 0    timolol (TIMOPTIC) 0 5 % ophthalmic solution Administer 1 drop to both eyes 2 (two) times a day        No current facility-administered medications for this visit       No Known Allergies   Immunizations:     Immunization History   Administered Date(s) Administered    COVID-19 PFIZER VACCINE 0 3 ML IM 03/26/2021, 04/19/2021    INFLUENZA 11/01/2015, 11/29/2017, 10/03/2018, 10/13/2021    Influenza Injectable, MDCK, Preservative Free, Quadrivalent, 0 5 mL 11/29/2017    Influenza Split High Dose Preservative Free IM 09/19/2019    Influenza, high dose seasonal 0 7 mL 11/09/2020    Pneumococcal Conjugate 13-Valent 01/09/2019    Pneumococcal Polysaccharide PPV23 12/23/2020    Zoster Vaccine Recombinant 08/12/2019, 02/12/2020      Health Maintenance:         Topic Date Due    Colorectal Cancer Screening  10/28/2022    Hepatitis C Screening  Completed         Topic Date Due    DTaP,Tdap,and Td Vaccines (1 - Tdap) Never done    COVID-19 Vaccine (3 - Booster for Pfizer series) 09/19/2021      Medicare Health Risk Assessment:     /62 (BP Location: Left arm, Patient Position: Sitting, Cuff Size: Standard)   Pulse 57   Temp 98 7 °F (37 1 °C) (Temporal)   Resp 18   Ht 6' 1" (1 854 m)   Wt 69 3 kg (152 lb 12 8 oz)   SpO2 99%   BMI 20 16 kg/m²      Dylan Santa is here for his Subsequent Wellness visit  Last Medicare Wellness visit information reviewed, patient interviewed and updates made to the record today  Health Risk Assessment:   Patient rates overall health as very good  Patient feels that their physical health rating is much better  Patient is very satisfied with their life  Eyesight was rated as same  Hearing was rated as same  Patient feels that their emotional and mental health rating is slightly better  Patients states they are sometimes angry  Patient states they are sometimes unusually tired/fatigued  Pain experienced in the last 7 days has been some  Patient's pain rating has been 7/10  Patient states that he has experienced no weight loss or gain in last 6 months  Depression Screening:   PHQ-2 Score: 0      Fall Risk Screening: In the past year, patient has experienced: no history of falling in past year      Home Safety:  Patient does not have trouble with stairs inside or outside of their home  Patient has working smoke alarms and has working carbon monoxide detector  Home safety hazards include: none  Nutrition:   Current diet is Regular  Medications:   Patient is currently taking over-the-counter supplements  OTC medications include: see medication list  Patient is able to manage medications  Activities of Daily Living (ADLs)/Instrumental Activities of Daily Living (IADLs):   Walk and transfer into and out of bed and chair?: Yes  Dress and groom yourself?: Yes    Bathe or shower yourself?: Yes    Feed yourself? Yes  Do your laundry/housekeeping?: Yes  Manage your money, pay your bills and track your expenses?: Yes  Make your own meals?: Yes    Do your own shopping?: Yes    Previous Hospitalizations:   Any hospitalizations or ED visits within the last 12 months?: Yes    How many hospitalizations have you had in the last year?: 1-2    Advance Care Planning:   Living will: Yes    Advanced directive: Yes    Advanced directive counseling given: Yes    End of Life Decisions reviewed with patient: Yes    Provider agrees with end of life decisions: Yes      Comments: Discussed with patient: FULL CODE  Cognitive Screening:   Provider or family/friend/caregiver concerned regarding cognition?: No    PREVENTIVE SCREENINGS      Cardiovascular Screening:    General: Screening Not Indicated, History Lipid Disorder, Risks and Benefits Discussed and Screening Current      Diabetes Screening:     General: Screening Current and Risks and Benefits Discussed      Colorectal Cancer Screening:     General: Screening Current      Prostate Cancer Screening:    General: Screening Current and Risks and Benefits Discussed      Osteoporosis Screening:    General: Risks and Benefits Discussed and Screening Not Indicated      Abdominal Aortic Aneurysm (AAA) Screening:    Risk factors include: age between 73-67 yo        General: Screening Not Indicated      Lung Cancer Screening:     General: Screening Not Indicated      Hepatitis C Screening:    General: Screening Current    Screening, Brief Intervention, and Referral to Treatment (SBIRT)    Screening  Typical number of drinks in a day: 0  Typical number of drinks in a week: 0  Interpretation: Low risk drinking behavior      Single Item Drug Screening:  How often have you used an illegal drug (including marijuana) or a prescription medication for non-medical reasons in the past year? never    Single Item Drug Screen Score: 0  Interpretation: Negative screen for possible drug use disorder    Brief Intervention  Alcohol & drug use screenings were reviewed  No concerns regarding substance use disorder identified  Healthy alcohol use/limits discussed  Other Counseling Topics:   Car/seat belt/driving safety, skin self-exam, sunscreen and calcium and vitamin D intake and regular weightbearing exercise         Alicia Baldwin MD

## 2022-02-01 NOTE — PATIENT INSTRUCTIONS
Medicare Preventive Visit Patient Instructions  Thank you for completing your Welcome to Medicare Visit or Medicare Annual Wellness Visit today  Your next wellness visit will be due in one year (2/2/2023)  The screening/preventive services that you may require over the next 5-10 years are detailed below  Some tests may not apply to you based off risk factors and/or age  Screening tests ordered at today's visit but not completed yet may show as past due  Also, please note that scanned in results may not display below  Preventive Screenings:  Service Recommendations Previous Testing/Comments   Colorectal Cancer Screening  · Colonoscopy    · Fecal Occult Blood Test (FOBT)/Fecal Immunochemical Test (FIT)  · Fecal DNA/Cologuard Test  · Flexible Sigmoidoscopy Age: 54-65 years old   Colonoscopy: every 10 years (May be performed more frequently if at higher risk)  OR  FOBT/FIT: every 1 year  OR  Cologuard: every 3 years  OR  Sigmoidoscopy: every 5 years  Screening may be recommended earlier than age 48 if at higher risk for colorectal cancer  Also, an individualized decision between you and your healthcare provider will decide whether screening between the ages of 74-80 would be appropriate   Colonoscopy: 10/28/2019  FOBT/FIT: Not on file  Cologuard: Not on file  Sigmoidoscopy: Not on file    Screening Current     Prostate Cancer Screening Individualized decision between patient and health care provider in men between ages of 53-78   Medicare will cover every 12 months beginning on the day after your 50th birthday PSA: 2 7 ng/mL     Screening Current     Hepatitis C Screening Once for adults born between 1945 and 1965  More frequently in patients at high risk for Hepatitis C Hep C Antibody: 03/30/2016    Screening Current   Diabetes Screening 1-2 times per year if you're at risk for diabetes or have pre-diabetes Fasting glucose: 88 mg/dL   A1C: 5 6 %    Screening Current   Cholesterol Screening Once every 5 years if you don't have a lipid disorder  May order more often based on risk factors  Lipid panel: 06/25/2021    Screening Not Indicated  History Lipid Disorder      Other Preventive Screenings Covered by Medicare:  1  Abdominal Aortic Aneurysm (AAA) Screening: covered once if your at risk  You're considered to be at risk if you have a family history of AAA or a male between the age of 73-68 who smoking at least 100 cigarettes in your lifetime  2  Lung Cancer Screening: covers low dose CT scan once per year if you meet all of the following conditions: (1) Age 50-69; (2) No signs or symptoms of lung cancer; (3) Current smoker or have quit smoking within the last 15 years; (4) You have a tobacco smoking history of at least 30 pack years (packs per day x number of years you smoked); (5) You get a written order from a healthcare provider  3  Glaucoma Screening: covered annually if you're considered high risk: (1) You have diabetes OR (2) Family history of glaucoma OR (3)  aged 48 and older OR (3)  American aged 72 and older  3  Osteoporosis Screening: covered every 2 years if you meet one of the following conditions: (1) Have a vertebral abnormality; (2) On glucocorticoid therapy for more than 3 months; (3) Have primary hyperparathyroidism; (4) On osteoporosis medications and need to assess response to drug therapy  5  HIV Screening: covered annually if you're between the age of 12-76  Also covered annually if you are younger than 13 and older than 72 with risk factors for HIV infection  For pregnant patients, it is covered up to 3 times per pregnancy      Immunizations:  Immunization Recommendations   Influenza Vaccine Annual influenza vaccination during flu season is recommended for all persons aged >= 6 months who do not have contraindications   Pneumococcal Vaccine (Prevnar and Pneumovax)  * Prevnar = PCV13  * Pneumovax = PPSV23 Adults 25-60 years old: 1-3 doses may be recommended based on certain risk factors  Adults 72 years old: Prevnar (PCV13) vaccine recommended followed by Pneumovax (PPSV23) vaccine  If already received PPSV23 since turning 65, then PCV13 recommended at least one year after PPSV23 dose  Hepatitis B Vaccine 3 dose series if at intermediate or high risk (ex: diabetes, end stage renal disease, liver disease)   Tetanus (Td) Vaccine - COST NOT COVERED BY MEDICARE PART B Following completion of primary series, a booster dose should be given every 10 years to maintain immunity against tetanus  Td may also be given as tetanus wound prophylaxis  Tdap Vaccine - COST NOT COVERED BY MEDICARE PART B Recommended at least once for all adults  For pregnant patients, recommended with each pregnancy  Shingles Vaccine (Shingrix) - COST NOT COVERED BY MEDICARE PART B  2 shot series recommended in those aged 48 and above     Health Maintenance Due:      Topic Date Due    Colorectal Cancer Screening  10/28/2022    Hepatitis C Screening  Completed     Immunizations Due:      Topic Date Due    DTaP,Tdap,and Td Vaccines (1 - Tdap) Never done    COVID-19 Vaccine (3 - Booster for Pfizer series) 09/19/2021     Advance Directives   What are advance directives? Advance directives are legal documents that state your wishes and plans for medical care  These plans are made ahead of time in case you lose your ability to make decisions for yourself  Advance directives can apply to any medical decision, such as the treatments you want, and if you want to donate organs  What are the types of advance directives? There are many types of advance directives, and each state has rules about how to use them  You may choose a combination of any of the following:  · Living will: This is a written record of the treatment you want  You can also choose which treatments you do not want, which to limit, and which to stop at a certain time  This includes surgery, medicine, IV fluid, and tube feedings     · Durable power of  for [facility]): This is a written record that states who you want to make healthcare choices for you when you are unable to make them for yourself  This person, called a proxy, is usually a family member or a friend  You may choose more than 1 proxy  · Do not resuscitate (DNR) order:  A DNR order is used in case your heart stops beating or you stop breathing  It is a request not to have certain forms of treatment, such as CPR  A DNR order may be included in other types of advance directives  · Medical directive: This covers the care that you want if you are in a coma, near death, or unable to make decisions for yourself  You can list the treatments you want for each condition  Treatment may include pain medicine, surgery, blood transfusions, dialysis, IV or tube feedings, and a ventilator (breathing machine)  · Values history: This document has questions about your views, beliefs, and how you feel and think about life  This information can help others choose the care that you would choose  Why are advance directives important? An advance directive helps you control your care  Although spoken wishes may be used, it is better to have your wishes written down  Spoken wishes can be misunderstood, or not followed  Treatments may be given even if you do not want them  An advance directive may make it easier for your family to make difficult choices about your care  © Copyright [employer] 2018 Information is for End User's use only and may not be sold, redistributed or otherwise used for commercial purposes  All illustrations and images included in CareNotes® are the copyrighted property of [employer]  or [facility] Preventive Visit Patient Instructions  Thank you for completing your Welcome to Medicare Visit or Medicare Annual Wellness Visit today  Your next wellness visit will be due in one year (2/2/2023)    The screening/preventive services that you may require over the next 5-10 years are detailed below  Some tests may not apply to you based off risk factors and/or age  Screening tests ordered at today's visit but not completed yet may show as past due  Also, please note that scanned in results may not display below  Preventive Screenings:  Service Recommendations Previous Testing/Comments   Colorectal Cancer Screening  · Colonoscopy    · Fecal Occult Blood Test (FOBT)/Fecal Immunochemical Test (FIT)  · Fecal DNA/Cologuard Test  · Flexible Sigmoidoscopy Age: 54-65 years old   Colonoscopy: every 10 years (May be performed more frequently if at higher risk)  OR  FOBT/FIT: every 1 year  OR  Cologuard: every 3 years  OR  Sigmoidoscopy: every 5 years  Screening may be recommended earlier than age 48 if at higher risk for colorectal cancer  Also, an individualized decision between you and your healthcare provider will decide whether screening between the ages of 74-80 would be appropriate  Colonoscopy: 10/28/2019  FOBT/FIT: Not on file  Cologuard: Not on file  Sigmoidoscopy: Not on file    Screening Current     Prostate Cancer Screening Individualized decision between patient and health care provider in men between ages of 53-78   Medicare will cover every 12 months beginning on the day after your 50th birthday PSA: 2 7 ng/mL     Screening Current     Hepatitis C Screening Once for adults born between 1945 and 1965  More frequently in patients at high risk for Hepatitis C Hep C Antibody: 03/30/2016    Screening Current   Diabetes Screening 1-2 times per year if you're at risk for diabetes or have pre-diabetes Fasting glucose: 88 mg/dL   A1C: 5 6 %    Screening Current   Cholesterol Screening Once every 5 years if you don't have a lipid disorder  May order more often based on risk factors  Lipid panel: 06/25/2021    Screening Not Indicated  History Lipid Disorder      Other Preventive Screenings Covered by Medicare:  6   Abdominal Aortic Aneurysm (AAA) Screening: covered once if your at risk  You're considered to be at risk if you have a family history of AAA or a male between the age of 73-68 who smoking at least 100 cigarettes in your lifetime  7  Lung Cancer Screening: covers low dose CT scan once per year if you meet all of the following conditions: (1) Age 50-69; (2) No signs or symptoms of lung cancer; (3) Current smoker or have quit smoking within the last 15 years; (4) You have a tobacco smoking history of at least 30 pack years (packs per day x number of years you smoked); (5) You get a written order from a healthcare provider  8  Glaucoma Screening: covered annually if you're considered high risk: (1) You have diabetes OR (2) Family history of glaucoma OR (3)  aged 48 and older OR (3)  American aged 72 and older  5  Osteoporosis Screening: covered every 2 years if you meet one of the following conditions: (1) Have a vertebral abnormality; (2) On glucocorticoid therapy for more than 3 months; (3) Have primary hyperparathyroidism; (4) On osteoporosis medications and need to assess response to drug therapy  10  HIV Screening: covered annually if you're between the age of 12-76  Also covered annually if you are younger than 13 and older than 72 with risk factors for HIV infection  For pregnant patients, it is covered up to 3 times per pregnancy  Immunizations:  Immunization Recommendations   Influenza Vaccine Annual influenza vaccination during flu season is recommended for all persons aged >= 6 months who do not have contraindications   Pneumococcal Vaccine (Prevnar and Pneumovax)  * Prevnar = PCV13  * Pneumovax = PPSV23 Adults 25-60 years old: 1-3 doses may be recommended based on certain risk factors  Adults 72 years old: Prevnar (PCV13) vaccine recommended followed by Pneumovax (PPSV23) vaccine  If already received PPSV23 since turning 65, then PCV13 recommended at least one year after PPSV23 dose     Hepatitis B Vaccine 3 dose series if at intermediate or high risk (ex: diabetes, end stage renal disease, liver disease)   Tetanus (Td) Vaccine - COST NOT COVERED BY MEDICARE PART B Following completion of primary series, a booster dose should be given every 10 years to maintain immunity against tetanus  Td may also be given as tetanus wound prophylaxis  Tdap Vaccine - COST NOT COVERED BY MEDICARE PART B Recommended at least once for all adults  For pregnant patients, recommended with each pregnancy  Shingles Vaccine (Shingrix) - COST NOT COVERED BY MEDICARE PART B  2 shot series recommended in those aged 48 and above     Health Maintenance Due:      Topic Date Due    Colorectal Cancer Screening  10/28/2022    Hepatitis C Screening  Completed     Immunizations Due:      Topic Date Due    DTaP,Tdap,and Td Vaccines (1 - Tdap) Never done    COVID-19 Vaccine (3 - Booster for Pfizer series) 09/19/2021     Advance Directives   What are advance directives? Advance directives are legal documents that state your wishes and plans for medical care  These plans are made ahead of time in case you lose your ability to make decisions for yourself  Advance directives can apply to any medical decision, such as the treatments you want, and if you want to donate organs  What are the types of advance directives? There are many types of advance directives, and each state has rules about how to use them  You may choose a combination of any of the following:  · Living will: This is a written record of the treatment you want  You can also choose which treatments you do not want, which to limit, and which to stop at a certain time  This includes surgery, medicine, IV fluid, and tube feedings  · Durable power of  for healthcare [place] [facility]): This is a written record that states who you want to make healthcare choices for you when you are unable to make them for yourself  This person, called a proxy, is usually a family member or a friend  You may choose more than 1 proxy    · Do not resuscitate (DNR) order:  A DNR order is used in case your heart stops beating or you stop breathing  It is a request not to have certain forms of treatment, such as CPR  A DNR order may be included in other types of advance directives  · Medical directive: This covers the care that you want if you are in a coma, near death, or unable to make decisions for yourself  You can list the treatments you want for each condition  Treatment may include pain medicine, surgery, blood transfusions, dialysis, IV or tube feedings, and a ventilator (breathing machine)  · Values history: This document has questions about your views, beliefs, and how you feel and think about life  This information can help others choose the care that you would choose  Why are advance directives important? An advance directive helps you control your care  Although spoken wishes may be used, it is better to have your wishes written down  Spoken wishes can be misunderstood, or not followed  Treatments may be given even if you do not want them  An advance directive may make it easier for your family to make difficult choices about your care  © Copyright [employer] 2018 Information is for End User's use only and may not be sold, redistributed or otherwise used for commercial purposes   All illustrations and images included in CareNotes® are the copyrighted property of [employer]  or [address] [place] [address] [employer]

## 2022-02-18 DIAGNOSIS — N13.8 ENLARGED PROSTATE WITH URINARY OBSTRUCTION: Primary | ICD-10-CM

## 2022-02-18 DIAGNOSIS — N40.1 ENLARGED PROSTATE WITH URINARY OBSTRUCTION: Primary | ICD-10-CM

## 2022-02-18 DIAGNOSIS — N40.1 BENIGN PROSTATIC HYPERPLASIA WITH URINARY FREQUENCY: ICD-10-CM

## 2022-02-18 DIAGNOSIS — R35.0 BENIGN PROSTATIC HYPERPLASIA WITH URINARY FREQUENCY: ICD-10-CM

## 2022-02-18 RX ORDER — TAMSULOSIN HYDROCHLORIDE 0.4 MG/1
0.4 CAPSULE ORAL
Qty: 90 CAPSULE | Refills: 3 | Status: SHIPPED | OUTPATIENT
Start: 2022-02-18 | End: 2022-05-28

## 2022-02-18 NOTE — TELEPHONE ENCOUNTER
Patient called in after communication with [facility] to confirm that prescriptions for him should go to [facility] for 90-day supply with 3 refills  Please follow up with patient

## 2022-02-18 NOTE — TELEPHONE ENCOUNTER
An Auto-fax Refill Request for Finasteride 5mg was received from [facility] mail order pharmacy  The patient was last seen on 1/12/22 by SUE Ruiz Aas in the [place] location; continuation of the medication was authorized at that time    Request for same, 90 day supply with 3 refills was queued and forwarded to the Advanced Practitioner covering the [place] location for approval

## 2022-02-18 NOTE — TELEPHONE ENCOUNTER
Patient is changing his pharmacy to [facility] and they are looking for an approval from the doctor for 90-day supply

## 2022-02-21 RX ORDER — FINASTERIDE 5 MG/1
5 TABLET, FILM COATED ORAL DAILY
Qty: 90 TABLET | Refills: 3 | Status: SHIPPED | OUTPATIENT
Start: 2022-02-21

## 2022-02-21 NOTE — TELEPHONE ENCOUNTER
Message was taken but not sent to a nurse, AP or physician to process    Today, I received an Auto-fax Refill Request for Finasteride 5mg was received from [place] [facility] mail order pharmacy  The patient was last seen on 1/12/22 by SUE Prescott in the [facility]; continuation of the medication was authorized at that time    Request for same, 90 day supply with 3 refills was queued and forwarded to the Advanced Practitioner covering the [place] location for approval

## 2022-05-20 ENCOUNTER — NURSE TRIAGE (OUTPATIENT)
Dept: OTHER | Facility: OTHER | Age: 72
End: 2022-05-20

## 2022-05-20 NOTE — TELEPHONE ENCOUNTER
Reason for Disposition   Caller has NON-URGENT medicine question about med that PCP or specialist prescribed and triager unable to answer question    Answer Assessment - Initial Assessment Questions  1  NAME of MEDICATION: "What medicine are you calling about?"      Flomax; patient on finasteride and stated he had never taken flomax but it was mailed to him by [facility] today  Denies any difficulty urinating  Saw in note from Feb that medication was ordered and sent  Patient is worried that he is going to be charged for medication that he hasn't taken before and wanted to know what he should do      Protocols used: MEDICATION QUESTION CALL-ADULT-[place]

## 2022-05-20 NOTE — TELEPHONE ENCOUNTER
Regarding: questioning if he needs to take tamsulosin  ----- Message from Danniel Kocher, RN sent at 5/20/2022  3:35 PM EDT -----  "I want to clarify whether or not I need to be taking this tamsulosin medication?  I just received it today from my mail order pharmacy and I'm puzzled because I have never taken it before?"

## 2022-05-23 NOTE — TELEPHONE ENCOUNTER
Tamsulosin last refilled on 2/18/2022 via request to [address]  He will likely get charged for medication, as pharmacies do not accept returns  If he is not experiencing any lower urinary tract symptoms, ok for him to hold off on taking  Side effects can include lightheaded, dizziness, retrograde ejaculation, otherwise ok for him to take for management of BPH

## 2022-05-23 NOTE — TELEPHONE ENCOUNTER
Spoke to patient and advised of AP's note  Patient states he has no issues with urination  Advised to disregard Flomax medication that was sent  Advised to call the office in the meantime with any questions or concerns

## 2022-05-28 ENCOUNTER — HOSPITAL ENCOUNTER (EMERGENCY)
Facility: HOSPITAL | Age: 72
Discharge: HOME | End: 2022-05-28
Attending: EMERGENCY MEDICINE
Payer: COMMERCIAL

## 2022-05-28 VITALS
SYSTOLIC BLOOD PRESSURE: 165 MMHG | TEMPERATURE: 98.1 F | DIASTOLIC BLOOD PRESSURE: 89 MMHG | OXYGEN SATURATION: 100 % | HEART RATE: 56 BPM | RESPIRATION RATE: 16 BRPM

## 2022-05-28 DIAGNOSIS — R55 PRE-SYNCOPE: Primary | ICD-10-CM

## 2022-05-28 LAB
ALBUMIN SERPL BCP-MCNC: 4.2 G/DL (ref 3.5–5)
ALP SERPL-CCNC: 50 U/L (ref 34–104)
ALT SERPL W P-5'-P-CCNC: 9 U/L (ref 7–52)
ANION GAP SERPL CALCULATED.3IONS-SCNC: 5 MMOL/L (ref 4–13)
AST SERPL W P-5'-P-CCNC: 13 U/L (ref 13–39)
BASOPHILS # BLD AUTO: 0.04 THOUSANDS/ΜL (ref 0–0.1)
BASOPHILS NFR BLD AUTO: 1 % (ref 0–1)
BILIRUB SERPL-MCNC: 0.74 MG/DL (ref 0.2–1)
BUN SERPL-MCNC: 13 MG/DL (ref 5–25)
CALCIUM SERPL-MCNC: 9.5 MG/DL (ref 8.4–10.2)
CARDIAC TROPONIN I PNL SERPL HS: 3 NG/L
CHLORIDE SERPL-SCNC: 108 MMOL/L (ref 96–108)
CO2 SERPL-SCNC: 28 MMOL/L (ref 21–32)
CREAT SERPL-MCNC: 1.07 MG/DL (ref 0.6–1.3)
EOSINOPHIL # BLD AUTO: 0.24 THOUSAND/ΜL (ref 0–0.61)
EOSINOPHIL NFR BLD AUTO: 5 % (ref 0–6)
ERYTHROCYTE [DISTWIDTH] IN BLOOD BY AUTOMATED COUNT: 17.7 % (ref 11.6–15.1)
GFR SERPL CREATININE-BSD FRML MDRD: 68 ML/MIN/1.73SQ M
GLUCOSE SERPL-MCNC: 87 MG/DL (ref 65–140)
HCT VFR BLD AUTO: 45.7 % (ref 36.5–49.3)
HGB BLD-MCNC: 14.2 G/DL (ref 12–17)
IMM GRANULOCYTES # BLD AUTO: 0.01 THOUSAND/UL (ref 0–0.2)
IMM GRANULOCYTES NFR BLD AUTO: 0 % (ref 0–2)
LYMPHOCYTES # BLD AUTO: 0.74 THOUSANDS/ΜL (ref 0.6–4.47)
LYMPHOCYTES NFR BLD AUTO: 14 % (ref 14–44)
MCH RBC QN AUTO: 22 PG (ref 26.8–34.3)
MCHC RBC AUTO-ENTMCNC: 31.1 G/DL (ref 31.4–37.4)
MCV RBC AUTO: 71 FL (ref 82–98)
MONOCYTES # BLD AUTO: 0.51 THOUSAND/ΜL (ref 0.17–1.22)
MONOCYTES NFR BLD AUTO: 10 % (ref 4–12)
NEUTROPHILS # BLD AUTO: 3.76 THOUSANDS/ΜL (ref 1.85–7.62)
NEUTS SEG NFR BLD AUTO: 70 % (ref 43–75)
NRBC BLD AUTO-RTO: 0 /100 WBCS
PLATELET # BLD AUTO: 256 THOUSANDS/UL (ref 149–390)
PMV BLD AUTO: 10.5 FL (ref 8.9–12.7)
POTASSIUM SERPL-SCNC: 4.4 MMOL/L (ref 3.5–5.3)
PROT SERPL-MCNC: 6.9 G/DL (ref 6.4–8.4)
RBC # BLD AUTO: 6.45 MILLION/UL (ref 3.88–5.62)
SODIUM SERPL-SCNC: 141 MMOL/L (ref 135–147)
WBC # BLD AUTO: 5.3 THOUSAND/UL (ref 4.31–10.16)

## 2022-05-28 PROCEDURE — 96360 HYDRATION IV INFUSION INIT: CPT

## 2022-05-28 PROCEDURE — 99285 EMERGENCY DEPT VISIT HI MDM: CPT | Performed by: EMERGENCY MEDICINE

## 2022-05-28 PROCEDURE — 99285 EMERGENCY DEPT VISIT HI MDM: CPT

## 2022-05-28 PROCEDURE — 84484 ASSAY OF TROPONIN QUANT: CPT

## 2022-05-28 PROCEDURE — 93005 ELECTROCARDIOGRAM TRACING: CPT

## 2022-05-28 PROCEDURE — 80053 COMPREHEN METABOLIC PANEL: CPT

## 2022-05-28 PROCEDURE — 85025 COMPLETE CBC W/AUTO DIFF WBC: CPT

## 2022-05-28 PROCEDURE — 36415 COLL VENOUS BLD VENIPUNCTURE: CPT

## 2022-05-28 RX ADMIN — SODIUM CHLORIDE 500 ML: 0.9 INJECTION, SOLUTION INTRAVENOUS at 15:25

## 2022-05-28 NOTE — ED ATTENDING ATTESTATION
5/28/2022  I, Guillermo Yanez MD, saw and evaluated the patient  I have discussed the patient with the resident/non-physician practitioner and agree with the resident's/non-physician practitioner's findings, Plan of Care, and MDM as documented in the resident's/non-physician practitioner's note, except where noted  All available labs and Radiology studies were reviewed  I was present for key portions of any procedure(s) performed by the resident/non-physician practitioner and I was immediately available to provide assistance  At this point I agree with the current assessment done in the Emergency Department  I have conducted an independent evaluation of this patient a history and physical is as follows:    53-year-old male presenting for evaluation after an episode of presyncope that occurred at around 12:00 p m  This afternoon  Patient states that he was outside in his garage working on his car (car was not on) when he began to feel overheated and lightheaded  This was followed by onset of nausea and mild sweatiness  He laid down symptoms completely resolved after about 10 minutes  No chest pain, shortness of breath, or palpitations  He did not actually pass out  ED Course  ED Course as of 05/28/22 2330   Sat May 28, 2022   1629 Troponin 3  Chest pain yesterday was momentary, non-radiating, atypical in description  Troponin 3  Doubt ACS  Will discharge with ambulatory referral to cardiology for EKG change (new Q wave with T wave inversion in leads III and aVF), which was discussed with the pt and his wife at bedside  Doubt cardiogenic syncope  CBC and CMP unremarkable  Will discharge home with return precautions discussed            Critical Care Time  Procedures

## 2022-05-28 NOTE — ED PROVIDER NOTES
History  Chief Complaint   Patient presents with    Weakness - Generalized     Pt arrives c/o near syncopal episode earlier today accompanied by nausea without vomiting  Denies CP or SOB  Denies headache     Joshua Askew is a 69-year-old male presenting to the ED due to presyncopal episode while at home around noon  Patient states he was working on his car in a closed garage, went inside to take a break, became hot, diaphoretic, nauseous and presyncopal   Patient laid down for 10-15 minutes, symptoms resolved  Patient states last night, he had intermittent episodes of left parasternal chest throbbing while in bed which resolved on its own  Patient reports increasing life stressors  Did eat breakfast today  Patient currently denies any symptoms  No history of cardiac problems  No new medications  Denies syncope, LOC, seizures, chest pain, shortness of breath, dizziness prior to or during the event, vomiting, diarrhea, URI symptoms, fevers chills, urinary symptoms  Prior to Admission Medications   Prescriptions Last Dose Informant Patient Reported? Taking?    Cyanocobalamin (VITAMIN B-12) 2500 MCG SUBL  Self Yes Yes   Sig: Place 1 tablet under the tongue daily   Multiple Vitamin (MULTIVITAMIN) tablet  Self Yes No   Sig: Take 1 tablet by mouth daily   Multiple Vitamins-Minerals (PRESERVISION AREDS PO)  Self Yes No   Sig: Take 1 capsule by mouth 2 (two) times a day   docusate sodium (COLACE) 100 mg capsule  Self Yes Yes   Sig: Take 100 mg by mouth as needed    dorzolamide-timolol (COSOPT) 22 3-6 8 MG/ML ophthalmic solution  Self Yes Yes   finasteride (PROSCAR) 5 mg tablet   No Yes   Sig: Take 1 tablet (5 mg total) by mouth daily   latanoprost (XALATAN) 0 005 % ophthalmic solution  Self Yes Yes   Sig: Administer 1 drop to both eyes daily at bedtime    polyethylene glycol (MIRALAX) 17 g packet  Self No No   Sig: Take 17 g by mouth daily as needed (Constipation)      Facility-Administered Medications: None Past Medical History:   Diagnosis Date    Anemia     B12 def    Bunion of right foot 9/17/2020    Constipation     Glaucoma     suspect    Mixed hyperlipidemia 1/9/2019    Spasm of abdominal muscles of left side 1/29/2020       Past Surgical History:   Procedure Laterality Date    ABDOMINAL SURGERY      "intestine surgery"     BUNIONECTOMY      COLONOSCOPY      HERNIA REPAIR  2010    HERNIA REPAIR  2016    HERNIA REPAIR      ME [place] [facility] W/DIST [place] Right 10/12/2020    Procedure: BUNIONECTOMY, DOUBLE OSTEOTOMY;  Surgeon: Leah Doe DPM;  Location: [address] [place] [address] MAIN OR;  Service: Podiatry    ME EXCISION TUMOR SOFT TISSUE BACK/FLANK SUBQ 3+CM N/A 11/12/2021    Procedure: EXCISION  BIOPSY LESION/MASS BACK;  Surgeon: Kelsey Corona MD;  Location: [facility] MAIN [facility];  Service: General    ME EXCISION TUMOR SOFT TISSUE SHOULDER SUBQ 3+CM Right 11/12/2021    Procedure: EXCISION BIOPSY TISSUE LESION/MASS UPPER EXTREMITY;  Surgeon: Kelsey Corona MD;  Location: [facility] MAIN OR;  Service: General       Family History   Problem Relation Age of Onset    Hypertension Mother     Uterine cancer Sister      I have reviewed and agree with the history as documented  E-Cigarette/Vaping    E-Cigarette Use Never User      E-Cigarette/Vaping Substances    Nicotine No     THC No     CBD No     Flavoring No     Other No      Social History     Tobacco Use    Smoking status: Never Smoker    Smokeless tobacco: Never Used   Vaping Use    Vaping Use: Never used   Substance Use Topics    Alcohol use: Not Currently    Drug use: Never        Review of Systems   Constitutional: Positive for diaphoresis  Negative for chills and fever  HENT: Negative for ear pain and sore throat  Eyes: Negative for pain and visual disturbance  Respiratory: Negative for cough and shortness of breath  Cardiovascular: Positive for chest pain  Negative for palpitations  Gastrointestinal: Positive for nausea  Negative for abdominal pain and vomiting  Genitourinary: Negative for dysuria and hematuria  Musculoskeletal: Negative for arthralgias and back pain  Skin: Negative for color change and rash  Neurological: Negative for seizures and syncope  Pre-syncope   All other systems reviewed and are negative  Physical Exam  ED Triage Vitals   Temperature Pulse Respirations Blood Pressure SpO2   05/28/22 1528 05/28/22 1440 05/28/22 1440 05/28/22 1440 05/28/22 1440   98 1 °F (36 7 °C) 59 16 165/89 98 %      Temp Source Heart Rate Source Patient Position - Orthostatic VS BP Location FiO2 (%)   05/28/22 1528 05/28/22 1440 -- -- --   Oral Monitor         Pain Score       05/28/22 1440       No Pain             Orthostatic Vital Signs  Vitals:    05/28/22 1440 05/28/22 1445 05/28/22 1500   BP: 165/89 165/89    Pulse: 59 56 56       Physical Exam  Vitals and nursing note reviewed  Constitutional:       Appearance: He is well-developed  Comments: Patient resting in bed comfortably, speaking full sentence, satting 100% on room air  HENT:      Head: Normocephalic and atraumatic  Nose: Nose normal       Mouth/Throat:      Mouth: Mucous membranes are moist       Pharynx: Oropharynx is clear  Eyes:      General: Lids are normal  Lids are everted, no foreign bodies appreciated  Vision grossly intact  Gaze aligned appropriately  Extraocular Movements: Extraocular movements intact  Conjunctiva/sclera: Conjunctivae normal       Pupils: Pupils are equal, round, and reactive to light  Neck:      Vascular: No JVD  Trachea: Trachea and phonation normal    Cardiovascular:      Rate and Rhythm: Normal rate and regular rhythm  Pulses: Normal pulses  Radial pulses are 2+ on the right side and 2+ on the left side  Dorsalis pedis pulses are 2+ on the right side and 2+ on the left side  Heart sounds: Normal heart sounds  No murmur heard    Pulmonary:      Effort: Pulmonary effort is normal  No respiratory distress  Breath sounds: Normal breath sounds and air entry  Abdominal:      General: Bowel sounds are normal       Palpations: Abdomen is soft  Tenderness: There is no abdominal tenderness  Comments: Soft nontender non peritoneal, no CVA tenderness bilaterally   Musculoskeletal:      Cervical back: Full passive range of motion without pain, normal range of motion and neck supple  Right lower leg: No edema  Left lower leg: No edema  Skin:     General: Skin is warm and dry  Capillary Refill: Capillary refill takes less than 2 seconds  Comments: No diaphoresis, cyanosis, pallor, jaundice  Neurological:      General: No focal deficit present  Mental Status: He is alert           ED Medications  Medications   sodium chloride 0 9 % bolus 500 mL (0 mL Intravenous Stopped 5/28/22 1625)       Diagnostic Studies  Results Reviewed     Procedure Component Value Units Date/Time    HS Troponin 0hr (reflex protocol) [[id number]]  (Normal) Collected: 05/28/22 1536    Lab Status: Final result Specimen: Blood from Arm, Right Updated: 05/28/22 1620     hs TnI 0hr 3 ng/L     Comprehensive metabolic panel [[id number]] Collected: 05/28/22 1536    Lab Status: Final result Specimen: Blood from Arm, Right Updated: 05/28/22 1614     Sodium 141 mmol/L      Potassium 4 4 mmol/L      Chloride 108 mmol/L      CO2 28 mmol/L      ANION GAP 5 mmol/L      BUN 13 mg/dL      Creatinine 1 07 mg/dL      Glucose 87 mg/dL      Calcium 9 5 mg/dL      AST 13 U/L      ALT 9 U/L      Alkaline Phosphatase 50 U/L      Total Protein 6 9 g/dL      Albumin 4 2 g/dL      Total Bilirubin 0 74 mg/dL      eGFR 68 ml/min/1 73sq m     Narrative:      [place] guidelines for Chronic Kidney Disease (CKD):     Stage 1 with normal or high GFR (GFR > 90 mL/min/1 73 square meters)    Stage 2 Mild CKD (GFR = 60-89 mL/min/1 73 square meters)    Stage 3A Moderate CKD (GFR = 45-59 mL/min/1 73 square meters)    Stage 3B Moderate CKD (GFR = 30-44 mL/min/1 73 square meters)    Stage 4 Severe CKD (GFR = 15-29 mL/min/1 73 square meters)    Stage 5 End Stage CKD (GFR <15 mL/min/1 73 square meters)  Note: GFR calculation is accurate only with a steady state creatinine    CBC and differential [[id number]]  (Abnormal) Collected: 05/28/22 1536    Lab Status: Final result Specimen: Blood from Arm, Right Updated: 05/28/22 1548     WBC 5 30 Thousand/uL      RBC 6 45 Million/uL      Hemoglobin 14 2 g/dL      Hematocrit 45 7 %      MCV 71 fL      MCH 22 0 pg      MCHC 31 1 g/dL      RDW 17 7 %      MPV 10 5 fL      Platelets 545 Thousands/uL      nRBC 0 /100 WBCs      Neutrophils Relative 70 %      Immat GRANS % 0 %      Lymphocytes Relative 14 %      Monocytes Relative 10 %      Eosinophils Relative 5 %      Basophils Relative 1 %      Neutrophils Absolute 3 76 Thousands/µL      Immature Grans Absolute 0 01 Thousand/uL      Lymphocytes Absolute 0 74 Thousands/µL      Monocytes Absolute 0 51 Thousand/µL      Eosinophils Absolute 0 24 Thousand/µL      Basophils Absolute 0 04 Thousands/µL                  No orders to display         Procedures  ECG 12 Lead Documentation Only    Date/Time: 5/28/2022 3:21 PM  Performed by: Angelic Cortez MD  Authorized by: Angelic Cortez MD     Indications / Diagnosis:  Pre-syncope  Patient location:  ED  Previous ECG:     Previous ECG:  Compared to current    Comparison ECG info:  9/15/2020    Similarity:  Changes noted (Criteria for septal infarct no longer present, nonspecific change in ST segment in inferior leads, T-wave inversion in inferior leads )  Interpretation:     Interpretation: normal    Rate:     ECG rate:  49    ECG rate assessment: bradycardic    Rhythm:     Rhythm: sinus rhythm    Ectopy:     Ectopy: none    QRS:     QRS axis:  Left    QRS intervals:  Normal  Conduction:     Conduction: normal    ST segments:     ST segments:  Normal  T waves: T waves: inverted      Inverted:  III  Other findings:     Other findings: LVH    Comments:      QRS now inverted in lead 3, and AVF which is new compared to prior EKG  T-wave inversion now evident in lead III  No ST elevations or depressions, normal UT interval, normal QRS, normal QTC  No signs of Brugada  No delta wave  No signs of heart block  No prolonged QTc  ED Course  ED Course as of 05/28/22 2303   Sat May 28, 2022   1601 CBC within pt's baseline   1627 hs TnI 0hr: 3  CP>3 hours, pt currently denies CP  No ischemia on EKG  According to ACS protocol, ACS ruled out  HEART score=4  Safe for discharge with cardio follow up             [address]6 [employer] Most Recent Value   Heart Score Risk Calculator    History 1 Filed at: 05/28/2022 1628   ECG 1 Filed at: 05/28/2022 1628   Age 2 Filed at: 05/28/2022 1628   Risk Factors 0 Filed at: 05/28/2022 1628   Troponin 0 Filed at: 05/28/2022 1628   HEART Score 4 Filed at: 05/28/2022 1628                                MDM  Number of Diagnoses or Management Options  Pre-syncope  Diagnosis management comments: 76-year-old male presenting the ED due to presyncopal episode after working on his car  Symptoms most likely vasovagal given prodrome  Will obtain EKG to rule out cardiogenic syncope  Troponin negative  The EKG did not reveal any signs of ischemia  Did reveal sinus bradycardia which was seen on prior EKG  Heart score = 4, patient did not have any chest pain during ED stay, ACS ruled out, safe for discharge with cardio f/u  Patient given strict return precautions         Amount and/or Complexity of Data Reviewed  Clinical lab tests: ordered and reviewed  Tests in the radiology section of CPT®: ordered and reviewed  Review and summarize past medical records: yes    Risk of Complications, Morbidity, and/or Mortality  Presenting problems: low  Diagnostic procedures: low  Management options: low    Patient Progress  Patient progress: resolved      Disposition  Final diagnoses:   Pre-syncope - vasovagal     Time reflects when diagnosis was documented in both MDM as applicable and the Disposition within this note     Time User Action Codes Description Comment    5/28/2022  4:29 PM Fauzia Hoango Add [R55] Pre-syncope     5/28/2022  4:29 PM Fauzia Hoango Modify [R55] Pre-syncope vasovagal      ED Disposition     ED Disposition   Discharge    Condition   Stable    Date/Time   Sat May 28, 2022  4:29 PM    Comment   Marilyn Moyer discharge to home/self care                 Follow-up Information     Follow up With Specialties Details Why Contact Info Additional Information    Armando Miramontes MD Internal Medicine Call  As needed [address] [facility]  [phone]       [facility] Schedule an appointment as soon as possible for a visit today schedule appointment with cardiology for EKG changes [address] [facility] [address] [facility] [address] [place], [place], [facility] [address]          Discharge Medication List as of 5/28/2022  4:30 PM      CONTINUE these medications which have NOT CHANGED    Details   Cyanocobalamin (VITAMIN B-12) 2500 MCG SUBL Place 1 tablet under the tongue daily, Historical Med      docusate sodium (COLACE) 100 mg capsule Take 100 mg by mouth as needed , Historical Med      dorzolamide-timolol (COSOPT) 22 3-6 8 MG/ML ophthalmic solution Starting Mon 4/19/2021, Historical Med      finasteride (PROSCAR) 5 mg tablet Take 1 tablet (5 mg total) by mouth daily, Starting Mon 2/21/2022, Normal      latanoprost (XALATAN) 0 005 % ophthalmic solution Administer 1 drop to both eyes daily at bedtime , Starting Mon 6/22/2020, Historical Med      Multiple Vitamin (MULTIVITAMIN) tablet Take 1 tablet by mouth daily, Historical Med      Multiple Vitamins-Minerals (PRESERVISION AREDS PO) Take 1 capsule by mouth 2 (two) times a day, Historical Med      polyethylene glycol (MIRALAX) 17 g packet Take 17 g by mouth daily as needed (Constipation), Starting Mon 6/3/2019, No Print               PDMP Review     None           ED Provider  Attending physically available and evaluated Cyn Sharif I managed the patient along with the ED Attending      Electronically Signed by         Mckenna Mulligan MD  05/28/22 6871

## 2022-05-30 ENCOUNTER — TELEPHONE (OUTPATIENT)
Dept: OTHER | Facility: OTHER | Age: 72
End: 2022-05-30

## 2022-05-31 ENCOUNTER — OFFICE VISIT (OUTPATIENT)
Dept: INTERNAL MEDICINE CLINIC | Facility: OTHER | Age: 72
End: 2022-05-31
Payer: COMMERCIAL

## 2022-05-31 VITALS
BODY MASS INDEX: 20.3 KG/M2 | TEMPERATURE: 98.9 F | HEIGHT: 73 IN | HEART RATE: 69 BPM | SYSTOLIC BLOOD PRESSURE: 128 MMHG | RESPIRATION RATE: 20 BRPM | DIASTOLIC BLOOD PRESSURE: 74 MMHG | WEIGHT: 153.2 LBS | OXYGEN SATURATION: 99 %

## 2022-05-31 DIAGNOSIS — F32.A DEPRESSION, UNSPECIFIED DEPRESSION TYPE: Primary | ICD-10-CM

## 2022-05-31 DIAGNOSIS — R55 PRE-SYNCOPE: ICD-10-CM

## 2022-05-31 PROCEDURE — 99214 OFFICE O/P EST MOD 30 MIN: CPT | Performed by: NURSE PRACTITIONER

## 2022-05-31 RX ORDER — TRAZODONE HYDROCHLORIDE 50 MG/1
50 TABLET ORAL
Qty: 30 TABLET | Refills: 0 | Status: SHIPPED | OUTPATIENT
Start: 2022-05-31 | End: 2022-06-08

## 2022-05-31 NOTE — PROGRESS NOTES
Assessment/Plan:    Pre-syncope  Encouraged to continue with good oral hydration, avoid extreme temperatures  Depression  Will start Trazadone due to depression and Insomnia  Keep follow up with PCP, follow up sooner if needed  Diagnoses and all orders for this visit:    Depression, unspecified depression type  -     traZODone (DESYREL) 50 mg tablet; Take 1 tablet (50 mg total) by mouth daily at bedtime    Pre-syncope          Subjective:      Patient ID: Jamal Brooks is a 66 y o  male  Pt presents for ER f/u for presyncopal episode  Had some EKG changes from previous one and referred to cardiology- pt has appt for July    Note from ER attending-  "Feliz Aguilar is a 76-year-old male presenting to the ED due to presyncopal episode while at home around noon  Patient states he was working on his car in a closed garage, went inside to take a break, became hot, diaphoretic, nauseous and presyncopal   Patient laid down for 10-15 minutes, symptoms resolved  Patient states last night, he had intermittent episodes of left parasternal chest throbbing while in bed which resolved on its own  Patient reports increasing life stressors  Did eat breakfast today  Patient currently denies any symptoms  No history of cardiac problems  No new medications  Denies syncope, LOC, seizures, chest pain, shortness of breath, dizziness prior to or during the event, vomiting, diarrhea, URI symptoms, fevers chills, urinary symptoms  "     Pt also reports trouble sleeping and increased stress and mind racing  Melatonin not helpful      The following portions of the patient's history were reviewed and updated as appropriate: allergies, current medications, past family history, past medical history, past social history, past surgical history and problem list     Review of Systems   Constitutional: Negative for activity change, appetite change, chills, diaphoresis and fever     HENT: Negative for congestion, ear discharge, ear pain, postnasal drip, rhinorrhea, sinus pressure, sinus pain and sore throat  Eyes: Negative for pain, discharge, itching and visual disturbance  Respiratory: Negative for cough, chest tightness, shortness of breath and wheezing  Cardiovascular: Negative for chest pain, palpitations and leg swelling  Gastrointestinal: Negative for abdominal pain, constipation, diarrhea, nausea and vomiting  Endocrine: Negative for polydipsia, polyphagia and polyuria  Genitourinary: Negative for difficulty urinating, dysuria and urgency  Musculoskeletal: Negative for arthralgias, back pain and neck pain  Skin: Negative for rash and wound  Neurological: Negative for dizziness, weakness, numbness and headaches  Psychiatric/Behavioral: Positive for sleep disturbance  The patient is nervous/anxious  Objective:      /74 (BP Location: Left arm, Patient Position: Sitting, Cuff Size: Standard)   Pulse 69   Temp 98 9 °F (37 2 °C) (Temporal)   Resp 20   Ht 6' 1" (1 854 m)   Wt 69 5 kg (153 lb 3 2 oz)   SpO2 99%   BMI 20 21 kg/m²          Physical Exam  Constitutional:       General: He is not in acute distress  Appearance: He is well-developed  He is not diaphoretic  HENT:      Head: Normocephalic and atraumatic  Right Ear: External ear normal       Left Ear: External ear normal       Nose: Nose normal       Mouth/Throat:      Pharynx: No oropharyngeal exudate  Eyes:      General:         Right eye: No discharge  Left eye: No discharge  Conjunctiva/sclera: Conjunctivae normal       Pupils: Pupils are equal, round, and reactive to light  Neck:      Thyroid: No thyromegaly  Cardiovascular:      Rate and Rhythm: Normal rate and regular rhythm  Heart sounds: Normal heart sounds  No murmur heard  No friction rub  No gallop  Pulmonary:      Effort: Pulmonary effort is normal  No respiratory distress  Breath sounds: Normal breath sounds  No stridor   No wheezing or rales    Abdominal:      General: Bowel sounds are normal  There is no distension  Palpations: Abdomen is soft  Tenderness: There is no abdominal tenderness  Musculoskeletal:      Cervical back: Normal range of motion and neck supple  Lymphadenopathy:      Cervical: No cervical adenopathy  Skin:     General: Skin is warm and dry  Findings: No erythema or rash  Neurological:      Mental Status: He is alert and oriented to person, place, and time  Psychiatric:         Behavior: Behavior normal          Thought Content:  Thought content normal          Judgment: Judgment normal

## 2022-05-31 NOTE — ASSESSMENT & PLAN NOTE
Will start Trazadone due to depression and Insomnia  Keep follow up with PCP, follow up sooner if needed

## 2022-06-01 LAB
ATRIAL RATE: 49 BPM
P AXIS: 38 DEGREES
PR INTERVAL: 166 MS
QRS AXIS: -16 DEGREES
QRSD INTERVAL: 92 MS
QT INTERVAL: 470 MS
QTC INTERVAL: 424 MS
T WAVE AXIS: -6 DEGREES
VENTRICULAR RATE: 49 BPM

## 2022-06-01 PROCEDURE — 93010 ELECTROCARDIOGRAM REPORT: CPT | Performed by: INTERNAL MEDICINE

## 2022-06-03 ENCOUNTER — TELEPHONE (OUTPATIENT)
Dept: INTERNAL MEDICINE CLINIC | Facility: OTHER | Age: 72
End: 2022-06-03

## 2022-06-03 DIAGNOSIS — F51.01 PRIMARY INSOMNIA: Primary | ICD-10-CM
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
## 2022-06-03 NOTE — TELEPHONE ENCOUNTER
Patient was wondering if instead of the trazadone he could have lunesta or some kind of sleeping pill to help him sleep he does not need the trazadone

## 2022-06-03 NOTE — TELEPHONE ENCOUNTER
Pt has been taking new medication for 4 days and has not had much sleep he is saying in those 4 days he didn't even get 18 hours of sleep  He also said he was reading about the medication and its for depression which he said he never mentioned at his visit  He just cant sleep  He wants to speak to kristyn   Call back # [phone]

## 2022-06-05 ENCOUNTER — NURSE TRIAGE (OUTPATIENT)
Dept: OTHER | Facility: OTHER | Age: 72
End: 2022-06-05

## 2022-06-05 NOTE — TELEPHONE ENCOUNTER
Reason for Disposition   [1] COVID-19 diagnosed by positive lab test (e g , PCR, rapid self-test kit) AND [2] mild symptoms (e g , cough, fever, others) AND [0] no complications or SOB    Answer Assessment - Initial Assessment Questions  1  COVID-19 DIAGNOSIS: "Who made your COVID-19 diagnosis?" "Was it confirmed by a positive lab test or self-test?" If not diagnosed by a doctor (or NP/PA), ask "Are there lots of cases (community spread) where you live?" Note: See public health department website, if unsure  Positive home test today   2  COVID-19 EXPOSURE: "Was there any known exposure to COVID before the symptoms began?" CDC Definition of close contact: within 6 feet (2 meters) for a total of 15 minutes or more over a 24-hour period  unsure  3  ONSET: "When did the COVID-19 symptoms start?"       Yesterday morning  4  WORST SYMPTOM: "What is your worst symptom?" (e g , cough, fever, shortness of breath, muscle aches)      cough  5  COUGH: "Do you have a cough?" If Yes, ask: "How bad is the cough?"        Yes "real bad"  6  FEVER: "Do you have a fever?" If Yes, ask: "What is your temperature, how was it measured, and when did it start?"      denies  7  RESPIRATORY STATUS: "Describe your breathing?" (e g , shortness of breath, wheezing, unable to speak)       denies  8  BETTER-SAME-WORSE: "Are you getting better, staying the same or getting worse compared to yesterday?"  If getting worse, ask, "In what way?"      same  9  HIGH RISK DISEASE: "Do you have any chronic medical problems?" (e g , asthma, heart or lung disease, weak immune system, obesity, etc )      denies  10  VACCINE: "Have you had the COVID-19 vaccine?" If Yes, ask: "Which one, how many shots, when did you get it?"        yes  11  BOOSTER: "Have you received your COVID-19 booster?" If Yes, ask: "Which one and when did you get it?"        1 booster  12   PREGNANCY: "Is there any chance you are pregnant?" "When was your last menstrual period?"        n/a  13   OTHER SYMPTOMS: "Do you have any other symptoms?"  (e g , chills, fatigue, headache, loss of smell or taste, muscle pain, sore throat)        headaches    Protocols used: CORONAVIRUS (COVID-19) DIAGNOSED OR SUSPECTED-ADULT-

## 2022-06-05 NOTE — TELEPHONE ENCOUNTER
Regarding: [address]  ----- Message from Sally Montoya sent at 6/5/2022  1:47 PM EDT -----  Patient took a at home test and tested positive for COVID  Symptoms of persistent cough, mucus come up, sore throat

## 2022-06-06 ENCOUNTER — TELEMEDICINE (OUTPATIENT)
Dept: INTERNAL MEDICINE CLINIC | Facility: CLINIC | Age: 72
End: 2022-06-06
Payer: COMMERCIAL

## 2022-06-06 VITALS — TEMPERATURE: 98.7 F

## 2022-06-06 DIAGNOSIS — Z12.11 ENCOUNTER FOR COLORECTAL CANCER SCREENING: Primary | ICD-10-CM

## 2022-06-06 DIAGNOSIS — Z12.12 ENCOUNTER FOR COLORECTAL CANCER SCREENING: Primary | ICD-10-CM

## 2022-06-06 DIAGNOSIS — U07.1 COVID-19: ICD-10-CM

## 2022-06-06 PROCEDURE — 99442 PR PHYS/QHP TELEPHONE EVALUATION 11-20 MIN: CPT | Performed by: NURSE PRACTITIONER

## 2022-06-06 RX ORDER — GUAIFENESIN 600 MG
1200 TABLET, EXTENDED RELEASE 12 HR ORAL EVERY 12 HOURS SCHEDULED
Qty: 28 TABLET | Refills: 0 | Status: SHIPPED | OUTPATIENT
Start: 2022-06-06 | End: 2022-08-03

## 2022-06-06 RX ORDER — FLUTICASONE PROPIONATE 50 MCG
2 SPRAY, SUSPENSION (ML) NASAL DAILY
Qty: 16 G | Refills: 0 | Status: SHIPPED | OUTPATIENT
Start: 2022-06-06 | End: 2022-08-03 | Stop reason: ALTCHOICE

## 2022-06-06 NOTE — PROGRESS NOTES
COVID-19 Outpatient Progress Note    Assessment/Plan:    Problem List Items Addressed This Visit        Other    Encounter for colorectal cancer screening - Primary    Relevant Orders    Ambulatory referral for colonoscopy    COVID-19    Relevant Medications    guaiFENesin (MUCINEX) 600 mg 12 hr tablet    fluticasone (FLONASE) 50 mcg/act nasal spray         Disposition:     Patient has COVID-19 infection  Based off CDC guidelines, they were recommended to isolate for 5 days from the date of the positive test  If they remain asymptomatic, isolation may be ended followed by 5 days of wearing a mask when around othes to minimize risk of infecting others  If they have a fever, continue to stay home until fever resolves for at least 24 hours  Discussed symptom directed medication options with patient  Discussed vitamin D, vitamin C, and/or zinc supplementation with patient  Rest and fluids advised  Educated that the course of this illness could be 2-4 weeks  Discussed symptomatic relief, such as warm steam inhalations, tylenol/ibuprofen for fevers and body aches, rest, and drink plenty of fluids  Warm salt gargles for sore throat  Discussed red flag signs to go to the ER, such as chest pain or shortness of breath  Return to the office for reevaluation if symptoms worsen or do not improve in 1-2 weeks       I have spent 15 minutes directly with the patient        Encounter provider SUE Mcintosh    Provider located at [address] [id number]    Recent Visits  Date Type Provider Dept   06/03/22 Telephone Maude Giron MD [facility] - [place]   05/31/22 Office Visit SUE Mcintosh [facility] - [place]   Showing recent visits within past 7 days and meeting all other requirements  Today's Visits  Date Type Provider Dept   06/06/22 SUE Alonzo [facility]   Showing today's visits and meeting all other requirements  Future Appointments  No visits were found meeting these conditions  Showing future appointments within next 150 days and meeting all other requirements     This virtual check-in was done via telephone and he agrees to proceed  Patient agrees to participate in a virtual check in via telephone or video visit instead of presenting to the office to address urgent/immediate medical needs  Patient is aware this is a billable service  After connecting through Telephone, the patient was identified by name and date of birth  Joycelyn Argueta was informed that this was a telemedicine visit and that the exam was being conducted confidentially over secure lines  My office door was closed  No one else was in the room  Joycelyn Argueta acknowledged consent and understanding of privacy and security of the telemedicine visit  I informed the patient that I have reviewed his record in Epic and presented the opportunity for him to ask any questions regarding the visit today  The patient agreed to participate  Verification of patient location:  Patient is located in the following state in which I hold an active license: [place]    Subjective: Joycelyn Argueta is a 66 y o  male who has been screened for COVID-19  Symptom change since last report: improving  Patient's symptoms include fatigue, nasal congestion, sore throat, cough (productive) and headache  Patient denies fever, chills, malaise, rhinorrhea, anosmia, loss of taste, shortness of breath, chest tightness, abdominal pain, nausea, vomiting, diarrhea and myalgias  - Date of symptom onset: 6/4/2022  - Date of positive COVID-19 test: 6/5/2022  Type of test: Home antigen  COVID-19 vaccination status: Fully vaccinated (primary series)    Niecy Jaimes has been staying home and has isolated themselves in his home   He is taking care to not share personal items and is cleaning all surfaces that are touched often, like counters, tabletops, and doorknobs using household cleaning sprays or wipes  He is wearing a mask when he leaves his room       No results found for: [address] [facility] 98, 185 [facility], [address] [facility], [facility] 116, 350 [facility], 700 [facility]  Past Medical History:   Diagnosis Date    Anemia     B12 def    Bunion of right foot 9/17/2020    Constipation     Glaucoma     suspect    Mixed hyperlipidemia 1/9/2019    Spasm of abdominal muscles of left side 1/29/2020     Past Surgical History:   Procedure Laterality Date    ABDOMINAL SURGERY      "intestine surgery"     BUNIONECTOMY      COLONOSCOPY      HERNIA REPAIR  2010    HERNIA REPAIR  2016    HERNIA REPAIR      CT [place] W/SESMDC W/DIST Rey Boeck Right 10/12/2020    Procedure: BUNIONECTOMY, DOUBLE OSTEOTOMY;  Surgeon: Diana Madrid DPM;  Location: [facility] MAIN OR;  Service: Podiatry    CT EXCISION TUMOR SOFT TISSUE BACK/FLANK SUBQ 3+CM N/A 11/12/2021    Procedure: EXCISION  BIOPSY LESION/MASS BACK;  Surgeon: Iman Vanessa MD;  [facility];  Service: General    CT EXCISION TUMOR SOFT TISSUE SHOULDER SUBQ 3+CM Right 11/12/2021    Procedure: EXCISION BIOPSY TISSUE LESION/MASS UPPER EXTREMITY;  Surgeon: Iman Vanessa MD;  [facility];  Service: General     Current Outpatient Medications   Medication Sig Dispense Refill    Cyanocobalamin (VITAMIN B-12) 2500 MCG SUBL Place 1 tablet under the tongue daily      docusate sodium (COLACE) 100 mg capsule Take 100 mg by mouth as needed       dorzolamide-timolol (COSOPT) 22 3-6 8 MG/ML ophthalmic solution       finasteride (PROSCAR) 5 mg tablet Take 1 tablet (5 mg total) by mouth daily 90 tablet 3    fluticasone (FLONASE) 50 mcg/act nasal spray 2 sprays into each nostril daily 16 g 0    guaiFENesin (MUCINEX) 600 mg 12 hr tablet Take 2 tablets (1,200 mg total) by mouth every 12 (twelve) hours 28 tablet 0    latanoprost (XALATAN) 0 005 % ophthalmic solution Administer 1 drop to both eyes daily at bedtime       Multiple Vitamin (MULTIVITAMIN) tablet Take 1 tablet by mouth daily      Multiple Vitamins-Minerals (PRESERVISION AREDS PO) Take 1 capsule by mouth 2 (two) times a day      polyethylene glycol (MIRALAX) 17 g packet Take 17 g by mouth daily as needed (Constipation) 14 each 0    traZODone (DESYREL) 50 mg tablet Take 1 tablet (50 mg total) by mouth daily at bedtime (Patient not taking: No sig reported) 30 tablet 0     No current facility-administered medications for this visit  No Known Allergies    Review of Systems   Constitutional: Positive for fatigue  Negative for activity change, appetite change, chills, diaphoresis and fever  HENT: Positive for congestion and sore throat  Negative for ear discharge, ear pain, postnasal drip, rhinorrhea, sinus pressure and sinus pain  Eyes: Negative for pain, discharge, itching and visual disturbance  Respiratory: Positive for cough (productive)  Negative for chest tightness, shortness of breath and wheezing  Cardiovascular: Negative for chest pain, palpitations and leg swelling  Gastrointestinal: Negative for abdominal pain, constipation, diarrhea, nausea and vomiting  Endocrine: Negative for polydipsia, polyphagia and polyuria  Genitourinary: Negative for difficulty urinating, dysuria and urgency  Musculoskeletal: Negative for arthralgias, back pain, myalgias and neck pain  Skin: Negative for rash and wound  Neurological: Positive for headaches  Negative for dizziness, weakness and numbness  Objective:    Vitals:    06/06/22 0910   Temp: 98 7 °F (37 1 °C)       Physical Exam  Neurological:      Mental Status: He is alert and oriented to person, place, and time  VIRTUAL VISIT DISCLAIMER    Tati Morse verbally agrees to participate in [employer]   Pt is aware that Virtual Care Services could be limited without vital signs or the ability to perform a full hands-on physical Jadon Collins understands he or the provider may request at any time to terminate the video visit and request the patient to seek care or treatment in person

## 2022-06-07 ENCOUNTER — TELEPHONE (OUTPATIENT)
Dept: INTERNAL MEDICINE CLINIC | Facility: OTHER | Age: 72
End: 2022-06-07

## 2022-06-07 DIAGNOSIS — U07.1 COVID-19: Primary | ICD-10-CM

## 2022-06-07 NOTE — TELEPHONE ENCOUNTER
Pt was seen by Danie Barnett 6/6/2022 at a virtual appt via telephone  Pt was told to take mucinex and flonase  Pt has covid and wife and pt are very worried about pt   He is 65 y/o and stated he is a risk  Date of sxs onset 6/4/2022  Pt is seeking oral medication for covid paxlovid  Please advise  Pharmacy [facility]

## 2022-06-08 RX ORDER — ESZOPICLONE 1 MG/1
1 TABLET, FILM COATED ORAL
Qty: 10 TABLET | Refills: 0 | Status: SHIPPED | OUTPATIENT
Start: 2022-06-08 | End: 2022-06-08

## 2022-06-15 ENCOUNTER — TELEPHONE (OUTPATIENT)
Dept: INTERNAL MEDICINE CLINIC | Facility: OTHER | Age: 72
End: 2022-06-15

## 2022-06-15 NOTE — TELEPHONE ENCOUNTER
Patient tested positive for covid last week and had a visit with Dr Candance Kitchens  Patient finished his paxlovid script and patient still not feeling better  Still coughing and still has a sore throat and congestion  Wondering if there is anything else that can be ordered for him?

## 2022-06-16 ENCOUNTER — OFFICE VISIT (OUTPATIENT)
Dept: INTERNAL MEDICINE CLINIC | Facility: OTHER | Age: 72
End: 2022-06-16
Payer: COMMERCIAL

## 2022-06-16 VITALS
BODY MASS INDEX: 20.15 KG/M2 | HEART RATE: 80 BPM | DIASTOLIC BLOOD PRESSURE: 68 MMHG | HEIGHT: 73 IN | SYSTOLIC BLOOD PRESSURE: 120 MMHG | OXYGEN SATURATION: 99 % | WEIGHT: 152 LBS | TEMPERATURE: 98.1 F

## 2022-06-16 DIAGNOSIS — U07.1 COVID-19: Primary | ICD-10-CM

## 2022-06-16 PROCEDURE — 1036F TOBACCO NON-USER: CPT | Performed by: NURSE PRACTITIONER

## 2022-06-16 PROCEDURE — 3008F BODY MASS INDEX DOCD: CPT | Performed by: NURSE PRACTITIONER

## 2022-06-16 PROCEDURE — 1160F RVW MEDS BY RX/DR IN RCRD: CPT | Performed by: NURSE PRACTITIONER

## 2022-06-16 PROCEDURE — 99213 OFFICE O/P EST LOW 20 MIN: CPT | Performed by: NURSE PRACTITIONER

## 2022-06-16 RX ORDER — BENZONATATE 100 MG/1
100 CAPSULE ORAL 3 TIMES DAILY PRN
Qty: 20 CAPSULE | Refills: 0 | Status: SHIPPED | OUTPATIENT
Start: 2022-06-16 | End: 2022-08-03 | Stop reason: ALTCHOICE

## 2022-06-16 NOTE — ASSESSMENT & PLAN NOTE
- improving s/p treatment with Paxlovid  - continue Flonase 2 sprays each nostril daily  - start Allegra once daily  - start Tessalon Perles p r n , and continue Mucinex b i d

## 2022-06-16 NOTE — PROGRESS NOTES
Assessment/Plan:    Problem List Items Addressed This Visit        Other    COVID-19 - Primary     - improving s/p treatment with Paxlovid  - continue Flonase 2 sprays each nostril daily  - start Allegra once daily  - start Tessalon Perles p r n , and continue Mucinex b i d  Relevant Medications    benzonatate (TESSALON PERLES) 100 mg capsule          BMI Counseling: Body mass index is 20 05 kg/m²  M*Modal software was used to dictate this note  It may contain errors with dictating incorrect words or incorrect spelling  Please contact the provider directly with any questions  Subjective:      Patient ID: Rosalba Pena is a 66 y o  male  HPI     Patient presents today with ongoing upper respiratory symptoms  He started with URI symptoms on 06/04/2022 and was diagnosed COVID on 06/05/2022  At the time his symptoms included fatigue, nasal congestion, sore throat and productive cough as well as headache  He was started on Paxlovid on 06/07 and completed his course treatment  Today he is concern for ongoing productive cough  He also has post nasal drip which he thinks is contributing to his mucous  He is currently taking robitussin and mucinex  He states that he does notice his mucinex is lightening and thinning  Overall, he is feeling better  He is using flonase  The following portions of the patient's history were reviewed and updated as appropriate: allergies, current medications, past family history, past medical history, past social history, past surgical history and problem list     Review of Systems   Constitutional: Negative for chills and fever  HENT: Positive for congestion, postnasal drip and rhinorrhea  Negative for ear discharge, ear pain, sinus pressure, sinus pain and sore throat  Respiratory: Positive for cough  Negative for chest tightness, shortness of breath and wheezing  Gastrointestinal: Negative for diarrhea, nausea and vomiting     Musculoskeletal: Negative for myalgias  Neurological: Negative for headaches           Past Medical History:   Diagnosis Date    Anemia     B12 def    Bunion of right foot 9/17/2020    Constipation     Glaucoma     suspect    Mixed hyperlipidemia 1/9/2019    Spasm of abdominal muscles of left side 1/29/2020         Current Outpatient Medications:     benzonatate (TESSALON PERLES) 100 mg capsule, Take 1 capsule (100 mg total) by mouth 3 (three) times a day as needed for cough, Disp: 20 capsule, Rfl: 0    Cyanocobalamin (VITAMIN B-12) 2500 MCG SUBL, Place 1 tablet under the tongue daily, Disp: , Rfl:     docusate sodium (COLACE) 100 mg capsule, Take 100 mg by mouth as needed , Disp: , Rfl:     dorzolamide-timolol (COSOPT) 22 3-6 8 MG/ML ophthalmic solution, , Disp: , Rfl:     finasteride (PROSCAR) 5 mg tablet, Take 1 tablet (5 mg total) by mouth daily, Disp: 90 tablet, Rfl: 3    fluticasone (FLONASE) 50 mcg/act nasal spray, 2 sprays into each nostril daily, Disp: 16 g, Rfl: 0    guaiFENesin (MUCINEX) 600 mg 12 hr tablet, Take 2 tablets (1,200 mg total) by mouth every 12 (twelve) hours, Disp: 28 tablet, Rfl: 0    latanoprost (XALATAN) 0 005 % ophthalmic solution, Administer 1 drop to both eyes daily at bedtime , Disp: , Rfl:     Multiple Vitamin (MULTIVITAMIN) tablet, Take 1 tablet by mouth daily, Disp: , Rfl:     Multiple Vitamins-Minerals (PRESERVISION AREDS PO), Take 1 capsule by mouth 2 (two) times a day, Disp: , Rfl:     polyethylene glycol (MIRALAX) 17 g packet, Take 17 g by mouth daily as needed (Constipation), Disp: 14 each, Rfl: 0    Suvorexant 5 MG TABS, Take 1 tablet (5 mg total) by mouth daily at bedtime as needed (insomnia), Disp: 10 tablet, Rfl: 0    No Known Allergies    Social History   Past Surgical History:   Procedure Laterality Date    ABDOMINAL SURGERY      "intestine surgery"     BUNIONECTOMY      COLONOSCOPY      HERNIA REPAIR  2010    HERNIA REPAIR  2016   [address] HALLUX VALGUS W/SESMDC W/DIST METAR OSTEOT Right 10/12/2020    Procedure: BUNIONECTOMY, DOUBLE OSTEOTOMY;  Surgeon: Roxy Stokes DPM;  Location: [address] [place] [address] MAIN OR;  Service: Podiatry    AL EXCISION TUMOR SOFT TISSUE BACK/FLANK SUBQ 3+CM N/A 11/12/2021    Procedure: EXCISION  BIOPSY LESION/MASS BACK;  Surgeon: Xenia Springer MD;  [facility];  Service: General    AL EXCISION TUMOR SOFT TISSUE SHOULDER SUBQ 3+CM Right 11/12/2021    Procedure: EXCISION BIOPSY TISSUE LESION/MASS UPPER EXTREMITY;  Surgeon: Xenia Springer MD;  [facility];  Service: General     Family History   Problem Relation Age of Onset    Hypertension Mother     Uterine cancer Sister        Objective:  /68 (BP Location: Left arm, Patient Position: Sitting, Cuff Size: Adult)   Pulse 80   Temp 98 1 °F (36 7 °C) (Temporal)   Ht 6' 1" (1 854 m)   Wt 68 9 kg (152 lb)   SpO2 99%   BMI 20 05 kg/m²      Physical Exam  Vitals reviewed  Constitutional:       General: He is not in acute distress  Appearance: Normal appearance  He is not diaphoretic  HENT:      Head: Normocephalic and atraumatic  Right Ear: Tympanic membrane and external ear normal       Left Ear: Tympanic membrane and external ear normal       Mouth/Throat:      Mouth: Mucous membranes are moist       Pharynx: Oropharynx is clear  No oropharyngeal exudate  Eyes:      Extraocular Movements: Extraocular movements intact  Conjunctiva/sclera: Conjunctivae normal       Pupils: Pupils are equal, round, and reactive to light  Cardiovascular:      Rate and Rhythm: Normal rate and regular rhythm  Heart sounds: Normal heart sounds  No murmur heard  Pulmonary:      Effort: Pulmonary effort is normal  No respiratory distress  Breath sounds: Normal breath sounds  No wheezing, rhonchi or rales  Comments: No cough during exam  Musculoskeletal:      Right lower leg: No edema  Left lower leg: No edema     Skin:     General: Skin is warm and dry  Neurological:      Mental Status: He is alert and oriented to person, place, and time  Mental status is at baseline     Psychiatric:         Mood and Affect: Mood normal          Behavior: Behavior normal

## 2022-06-16 NOTE — PATIENT INSTRUCTIONS
- start tessalon perles every 8 hours as needed for cough  - continue mucinex twice daily  - start allegra once daily x 1-2 weeks   - continue flonase 2 sprays in each nostril once daily, look down and spray up

## 2022-07-08 ENCOUNTER — OFFICE VISIT (OUTPATIENT)
Dept: CARDIOLOGY CLINIC | Facility: CLINIC | Age: 72
End: 2022-07-08
Payer: COMMERCIAL

## 2022-07-08 VITALS
DIASTOLIC BLOOD PRESSURE: 80 MMHG | BODY MASS INDEX: 19.88 KG/M2 | OXYGEN SATURATION: 98 % | HEIGHT: 73 IN | WEIGHT: 150 LBS | HEART RATE: 65 BPM | SYSTOLIC BLOOD PRESSURE: 126 MMHG

## 2022-07-08 DIAGNOSIS — R55 PRE-SYNCOPE: ICD-10-CM

## 2022-07-08 DIAGNOSIS — R94.31 ABNORMAL EKG: Primary | ICD-10-CM

## 2022-07-08 PROCEDURE — 99204 OFFICE O/P NEW MOD 45 MIN: CPT | Performed by: INTERNAL MEDICINE

## 2022-07-08 PROCEDURE — 93000 ELECTROCARDIOGRAM COMPLETE: CPT | Performed by: INTERNAL MEDICINE

## 2022-07-08 PROCEDURE — 1160F RVW MEDS BY RX/DR IN RCRD: CPT | Performed by: INTERNAL MEDICINE

## 2022-07-08 NOTE — PROGRESS NOTES
Consultation - Cardiology   Shirin Hough 66 y o  male MRN: [id number]    Encounter: [id number]    5  Abnormal EKG     2  Pre-syncope  Ambulatory Referral to Cardiology    POCT ECG    vasovagal       Assessment/Plan     Assessment:     Near Syncope  Abnormal EKG    Plan:    Cleveland Brannon had one episode of near syncope that was most likely related to dehydration  His EKG in the ER showed NSR with inferior Q waves  His EKG today is completely normal and he is asymptomatic  His physical exam is normal  At this point no further CV testing is needed at this time and followup can be prn  Importance of hydration was discussed as well  History of Present Illness   Physician Requesting Consult: No att  providers found  Reason for Consult / Principal Problem: Abnormal EKG  HPI: Shirin Hough is a 66y o  year old male who presents with abnormal EKG  Patient was working on his car and while underneath and getting up he had an episode of lightheadedness and near syncope  He did not drink much water during that day  He went to the ER  EKG showed NSR with inferior q waves and was recommended to see cardiology  He has had no recurrence of those symptoms  He has no chest pain, dyspnea or palpitations  He has no prior history of CAD, CHF or arrhythmia  He is otherwise healthy and very active  He exercises regularly  Family hx was significant for his mother having some sort of valvular heart disease  Nonsmoker  He is a retired   Review of Systems   Constitutional: Negative  HENT: Negative  Eyes: Negative  Cardiovascular: Negative  Respiratory: Negative  Endocrine: Negative  Hematologic/Lymphatic: Negative  Skin: Negative  Musculoskeletal: Negative  Gastrointestinal: Negative  Genitourinary: Negative  Neurological: Negative  Psychiatric/Behavioral: Negative  Allergic/Immunologic: Negative          Historical Information   Past Medical History:   Diagnosis Date    Anemia     B12 def    Bunion of right foot 09/17/2020    Constipation     COVID-19 06/2022    Glaucoma     suspect    Mixed hyperlipidemia 01/09/2019    Spasm of abdominal muscles of left side 01/29/2020     Past Surgical History:   Procedure Laterality Date    ABDOMINAL SURGERY      "intestine surgery"     BUNIONECTOMY      COLONOSCOPY      HERNIA REPAIR  2010    HERNIA REPAIR  2016    HERNIA REPAIR      [facility] HALLUX VALGUS W/SESMDC W/DIST Felicia Rudder Right 10/12/2020    Procedure: BUNIONECTOMY, DOUBLE OSTEOTOMY;  Surgeon: Vijaya Ortiz DPM;  [facility];  Service: Podiatry    MO EXCISION TUMOR SOFT TISSUE BACK/FLANK SUBQ 3+CM N/A 11/12/2021    Procedure: EXCISION  BIOPSY LESION/MASS BACK;  Surgeon: Hao Montanez MD;  [facility];  Service: General    MO EXCISION TUMOR SOFT TISSUE SHOULDER SUBQ 3+CM Right 11/12/2021    Procedure: EXCISION BIOPSY TISSUE LESION/MASS UPPER EXTREMITY;  Surgeon: Hao Montanez MD;  [facility];  Service: General       Social History:  Social History     Substance and Sexual Activity   Alcohol Use Not Currently     Social History     Substance and Sexual Activity   Drug Use Never     Social History     Tobacco Use   Smoking Status Never Smoker   Smokeless Tobacco Never Used       Family History:   Family History   Problem Relation Age of Onset    Hypertension Mother     Uterine cancer Sister        Meds/Allergies   No Known Allergies    Current Outpatient Medications:     Ascorbic Acid (VITAMIN C PO), Take by mouth, Disp: , Rfl:     docusate sodium (COLACE) 100 mg capsule, Take 100 mg by mouth as needed , Disp: , Rfl:     dorzolamide-timolol (COSOPT) 22 3-6 8 MG/ML ophthalmic solution, , Disp: , Rfl:     finasteride (PROSCAR) 5 mg tablet, Take 1 tablet (5 mg total) by mouth daily, Disp: 90 tablet, Rfl: 3    latanoprost (XALATAN) 0 005 % ophthalmic solution, Administer 1 drop to both eyes daily at bedtime , Disp: , Rfl:     Multiple Vitamin (MULTIVITAMIN) tablet, Take 1 tablet by mouth daily, Disp: , Rfl:     Multiple Vitamins-Minerals (PRESERVISION AREDS PO), Take 1 capsule by mouth 2 (two) times a day, Disp: , Rfl:     Multiple Vitamins-Minerals (ZINC PO), Take by mouth, Disp: , Rfl:     polyethylene glycol (MIRALAX) 17 g packet, Take 17 g by mouth daily as needed (Constipation), Disp: 14 each, Rfl: 0    VITAMIN D PO, Take by mouth, Disp: , Rfl:     benzonatate (TESSALON PERLES) 100 mg capsule, Take 1 capsule (100 mg total) by mouth 3 (three) times a day as needed for cough (Patient not taking: No sig reported), Disp: 20 capsule, Rfl: 0    Cyanocobalamin (VITAMIN B-12) 2500 MCG SUBL, Place 1 tablet under the tongue daily (Patient not taking: Reported on 7/8/2022), Disp: , Rfl:     fluticasone (FLONASE) 50 mcg/act nasal spray, 2 sprays into each nostril daily (Patient not taking: Reported on 7/8/2022), Disp: 16 g, Rfl: 0    guaiFENesin (MUCINEX) 600 mg 12 hr tablet, Take 2 tablets (1,200 mg total) by mouth every 12 (twelve) hours (Patient not taking: Reported on 7/8/2022), Disp: 28 tablet, Rfl: 0    Suvorexant 5 MG TABS, Take 1 tablet (5 mg total) by mouth daily at bedtime as needed (insomnia) (Patient not taking: Reported on 7/8/2022), Disp: 10 tablet, Rfl: 0    Vitals:   Pulse: 65  Blood Pressue: 126/80  Weight: 68 kg (150 lb)      Physical Exam  Constitutional:       General: He is not in acute distress  Appearance: He is well-developed  He is not diaphoretic  HENT:      Head: Normocephalic  Eyes:      General: No scleral icterus  Right eye: No discharge  Conjunctiva/sclera: Conjunctivae normal    Neck:      Vascular: No JVD  Cardiovascular:      Rate and Rhythm: Normal rate and regular rhythm  Heart sounds: No murmur heard  No friction rub  No gallop  Pulmonary:      Effort: Pulmonary effort is normal  No respiratory distress  Breath sounds: Normal breath sounds  No wheezing or rales     Abdominal: General: Bowel sounds are normal  There is no distension  Palpations: Abdomen is soft  Tenderness: There is no abdominal tenderness  There is no rebound  Musculoskeletal:         General: No tenderness or deformity  Cervical back: Normal range of motion  Skin:     General: Skin is warm and dry  Neurological:      Mental Status: He is alert and oriented to person, place, and time  [unfilled]    Invasive Devices  Report    None                 Lab Results   Component Value Date     05/28/2022    CO2 28 05/28/2022    BUN 13 05/28/2022    CREATININE 1 07 05/28/2022    EGFR 68 05/28/2022    CALCIUM 9 5 05/28/2022    AST 13 05/28/2022    ALT 9 05/28/2022    ALKPHOS 50 05/28/2022     Lab Results   Component Value Date    WBC 5 30 05/28/2022    HGB 14 2 05/28/2022     05/28/2022     No components found for: TROP    Imaging:     EKG: NSR Normal ECG     Counseling / Coordination of Care  Total floor / unit time spent today 45 minutes  Greater than 50% of total time was spent with the patient and / or family counseling and / or coordination of care  A description of the counseling / coordination of care

## 2022-07-25 ENCOUNTER — APPOINTMENT (OUTPATIENT)
Dept: LAB | Facility: CLINIC | Age: 72
End: 2022-07-25
Payer: COMMERCIAL

## 2022-07-25 ENCOUNTER — VBI (OUTPATIENT)
Dept: ADMINISTRATIVE | Facility: OTHER | Age: 72
End: 2022-07-25

## 2022-07-25 DIAGNOSIS — E78.2 MODERATE MIXED HYPERLIPIDEMIA NOT REQUIRING STATIN THERAPY: ICD-10-CM

## 2022-07-25 LAB
CHOLEST SERPL-MCNC: 219 MG/DL
HDLC SERPL-MCNC: 83 MG/DL
LDLC SERPL CALC-MCNC: 123 MG/DL (ref 0–100)
NONHDLC SERPL-MCNC: 136 MG/DL
TRIGL SERPL-MCNC: 63 MG/DL

## 2022-07-25 PROCEDURE — 80061 LIPID PANEL: CPT

## 2022-07-25 PROCEDURE — 36415 COLL VENOUS BLD VENIPUNCTURE: CPT

## 2022-08-03 ENCOUNTER — OFFICE VISIT (OUTPATIENT)
Dept: INTERNAL MEDICINE CLINIC | Facility: OTHER | Age: 72
End: 2022-08-03
Payer: COMMERCIAL

## 2022-08-03 VITALS
SYSTOLIC BLOOD PRESSURE: 122 MMHG | BODY MASS INDEX: 20.2 KG/M2 | HEART RATE: 60 BPM | HEIGHT: 73 IN | TEMPERATURE: 97.1 F | OXYGEN SATURATION: 98 % | DIASTOLIC BLOOD PRESSURE: 68 MMHG | WEIGHT: 152.4 LBS

## 2022-08-03 DIAGNOSIS — E55.9 VITAMIN D DEFICIENCY: ICD-10-CM

## 2022-08-03 DIAGNOSIS — E78.2 MODERATE MIXED HYPERLIPIDEMIA NOT REQUIRING STATIN THERAPY: ICD-10-CM

## 2022-08-03 DIAGNOSIS — N40.1 BENIGN PROSTATIC HYPERPLASIA WITH URINARY RETENTION: ICD-10-CM

## 2022-08-03 DIAGNOSIS — E44.1 MILD PROTEIN-CALORIE MALNUTRITION (HCC): ICD-10-CM

## 2022-08-03 DIAGNOSIS — F32.A DEPRESSION, UNSPECIFIED DEPRESSION TYPE: ICD-10-CM

## 2022-08-03 DIAGNOSIS — R33.8 BENIGN PROSTATIC HYPERPLASIA WITH URINARY RETENTION: ICD-10-CM

## 2022-08-03 DIAGNOSIS — K59.09 OTHER CONSTIPATION: ICD-10-CM

## 2022-08-03 DIAGNOSIS — Z86.010 HISTORY OF COLON POLYPS: Primary | ICD-10-CM

## 2022-08-03 PROBLEM — U07.1 COVID-19: Status: RESOLVED | Noted: 2022-06-06 | Resolved: 2022-08-03

## 2022-08-03 PROBLEM — R55 PRE-SYNCOPE: Status: RESOLVED | Noted: 2022-05-31 | Resolved: 2022-08-03

## 2022-08-03 PROCEDURE — 99214 OFFICE O/P EST MOD 30 MIN: CPT | Performed by: INTERNAL MEDICINE

## 2022-08-03 PROCEDURE — 1160F RVW MEDS BY RX/DR IN RCRD: CPT | Performed by: INTERNAL MEDICINE

## 2022-08-03 NOTE — PROGRESS NOTES
Assessment/Plan:    Benign prostatic hyperplasia with urinary retention  Controlled with finasteride  Continue follow up with urology  Vitamin D deficiency  Continue vitamin supplementation  Moderate mixed hyperlipidemia not requiring statin therapy  Discussed diet and exercise  He continues to refuse statin therapy  Other constipation  continue dietary modifications, adequate hydration, and bowel regimen  Depression  Stable, continue current medication regimen  Diagnoses and all orders for this visit:    History of colon polyps  -     Ambulatory Referral to Gastroenterology; Future    Benign prostatic hyperplasia with urinary retention    Vitamin D deficiency    Moderate mixed hyperlipidemia not requiring statin therapy    Other constipation    Depression, unspecified depression type    Mild protein-calorie malnutrition ([address] )            Depression Screening and Follow-up Plan: Patient advised to follow-up with PCP for further management  Subjective:      Patient ID: Sebastián Parra is a 66 y o  male  Chief Complaint   Patient presents with    Follow-up     6 month, labs done 7/25    hm     Due for colonoscopy in October  Referral placed  Dx hx of polyps  66year old male is seen today for follow up of chronic conditions  Lab studies review today, lipid panel shows T  Cholesterol of 219 and LDL of 123  He is now feeling better since having COVID in June  He reports he has never been as sick  He has been compliant with his medication regimen  BPH: symptoms controlled with finasteride  He follows up with his urologist regularly  He has not experienced any recurrence of syncope  Hyperlipidemia  This is a chronic problem  The current episode started more than 1 year ago  The problem is controlled  Recent lipid tests were reviewed and are variable  Pertinent negatives include no chest pain or shortness of breath   Current antihyperlipidemic treatment includes diet change and exercise  There are no compliance problems  The following portions of the patient's history were reviewed and updated as appropriate: allergies, current medications, past family history, past medical history, past social history, past surgical history and problem list     Review of Systems   Constitutional: Negative for activity change, appetite change, chills, diaphoresis, fatigue and fever  HENT: Negative for congestion, postnasal drip, rhinorrhea, sinus pressure, sinus pain, sneezing and sore throat  Eyes: Negative for visual disturbance  Respiratory: Negative for apnea, cough, choking, chest tightness, shortness of breath and wheezing  Cardiovascular: Negative for chest pain, palpitations and leg swelling  Gastrointestinal: Negative for abdominal distention, abdominal pain, anal bleeding, blood in stool, constipation, diarrhea, nausea and vomiting  Endocrine: Negative for cold intolerance and heat intolerance  Genitourinary: Negative for difficulty urinating, dysuria and hematuria  Musculoskeletal: Negative  Skin: Negative  Neurological: Negative for dizziness, weakness, light-headedness, numbness and headaches  Hematological: Negative for adenopathy  Psychiatric/Behavioral: Negative for agitation, sleep disturbance and suicidal ideas  All other systems reviewed and are negative          Past Medical History:   Diagnosis Date    Anemia     B12 def    Bunion of right foot 09/17/2020    Constipation     COVID-19 06/2022    Glaucoma     suspect    Mixed hyperlipidemia 01/09/2019    Pre-syncope 5/31/2022    Spasm of abdominal muscles of left side 01/29/2020         Current Outpatient Medications:     Ascorbic Acid (VITAMIN C PO), Take by mouth, Disp: , Rfl:     Cyanocobalamin (VITAMIN B-12) 2500 MCG SUBL, Place 1 tablet under the tongue daily, Disp: , Rfl:     docusate sodium (COLACE) 100 mg capsule, Take 100 mg by mouth as needed , Disp: , Rfl:   dorzolamide-timolol (COSOPT) 22 3-6 8 MG/ML ophthalmic solution, , Disp: , Rfl:     finasteride (PROSCAR) 5 mg tablet, Take 1 tablet (5 mg total) by mouth daily, Disp: 90 tablet, Rfl: 3    latanoprost (XALATAN) 0 005 % ophthalmic solution, Administer 1 drop to both eyes daily at bedtime , Disp: , Rfl:     Multiple Vitamin (MULTIVITAMIN) tablet, Take 1 tablet by mouth daily, Disp: , Rfl:     Multiple Vitamins-Minerals (PRESERVISION AREDS PO), Take 1 capsule by mouth 2 (two) times a day, Disp: , Rfl:     Multiple Vitamins-Minerals (ZINC PO), Take by mouth, Disp: , Rfl:     polyethylene glycol (MIRALAX) 17 g packet, Take 17 g by mouth daily as needed (Constipation), Disp: 14 each, Rfl: 0    VITAMIN D PO, Take by mouth, Disp: , Rfl:     No Known Allergies    Social History   Past Surgical History:   Procedure Laterality Date    ABDOMINAL SURGERY      "intestine surgery"     BUNIONECTOMY      COLONOSCOPY      HERNIA REPAIR  2010    HERNIA REPAIR  2016   [facility] W/SESMDC W/DIST METAR OSTEOT Right 10/12/2020    Procedure: BUNIONECTOMY, DOUBLE OSTEOTOMY;  Surgeon: Nik Ward DPM;  Location: [address] [place] [address] MAIN OR;  Service: Podiatry    NE EXCISION TUMOR SOFT TISSUE BACK/FLANK SUBQ 3+CM N/A 11/12/2021    Procedure: EXCISION  BIOPSY LESION/MASS BACK;  Surgeon: Payton Juarez MD;  [facility] MA[facility];  Service: General    NE EXCISION TUMOR SOFT TISSUE SHOULDER SUBQ 3+CM Right 11/12/2021    Procedure: EXCISION BIOPSY TISSUE LESION/MASS UPPER EXTREMITY;  Surgeon: Payton Juarez MD;  [facility]: [facility] MA[facility];  Service: General     Family History   Problem Relation Age of Onset    Hypertension Mother     Uterine cancer Sister        Objective:  /68 (BP Location: Left arm, Patient Position: Sitting, Cuff Size: Adult)   Pulse 60   Temp (!) 97 1 °F (36 2 °C) (Tympanic)   Ht 6' 1" (1 854 m)   Wt 69 1 kg (152 lb 6 4 oz)   SpO2 98% Comment: ra  BMI 20 11 kg/m²     Recent Results (from the past 1344 hour(s))   Lipid panel    Collection Time: 07/25/22  8:09 AM   Result Value Ref Range    Cholesterol 219 (H) See Comment mg/dL    Triglycerides 63 See Comment mg/dL    HDL, Direct 83 >=40 mg/dL    LDL Calculated 123 (H) 0 - 100 mg/dL    Non-HDL-Chol (CHOL-HDL) 136 mg/dl            Physical Exam  Vitals and nursing note reviewed  Constitutional:       General: He is not in acute distress  Appearance: He is well-developed  He is not diaphoretic  HENT:      Head: Normocephalic and atraumatic  Eyes:      General: No scleral icterus  Right eye: No discharge  Left eye: No discharge  Conjunctiva/sclera: Conjunctivae normal       Pupils: Pupils are equal, round, and reactive to light  Neck:      Thyroid: No thyromegaly  Vascular: No JVD  Cardiovascular:      Rate and Rhythm: Normal rate and regular rhythm  Heart sounds: Normal heart sounds  No murmur heard  No friction rub  No gallop  Pulmonary:      Effort: Pulmonary effort is normal  No respiratory distress  Breath sounds: Normal breath sounds  No wheezing or rales  Chest:      Chest wall: No tenderness  Abdominal:      General: Bowel sounds are normal  There is no distension  Palpations: Abdomen is soft  There is no mass  Tenderness: There is no abdominal tenderness  There is no guarding or rebound  Musculoskeletal:         General: No tenderness or deformity  Normal range of motion  Cervical back: Normal range of motion and neck supple  Lymphadenopathy:      Cervical: No cervical adenopathy  Skin:     General: Skin is warm and dry  Coloration: Skin is not pale  Findings: No erythema or rash  Neurological:      Mental Status: He is alert and oriented to person, place, and time  Cranial Nerves: No cranial nerve deficit  Coordination: Coordination normal       Deep Tendon Reflexes: Reflexes are normal and symmetric     Psychiatric: Behavior: Behavior normal          Thought Content:  Thought content normal          Judgment: Judgment normal

## 2022-08-23 ENCOUNTER — PREP FOR PROCEDURE (OUTPATIENT)
Dept: GASTROENTEROLOGY | Facility: AMBULARY SURGERY CENTER | Age: 72
End: 2022-08-23

## 2022-08-23 ENCOUNTER — TELEPHONE (OUTPATIENT)
Dept: GASTROENTEROLOGY | Facility: AMBULARY SURGERY CENTER | Age: 72
End: 2022-08-23

## 2022-08-23 DIAGNOSIS — Z12.12 ENCOUNTER FOR COLORECTAL CANCER SCREENING: Primary | ICD-10-CM

## 2022-08-23 DIAGNOSIS — Z12.11 ENCOUNTER FOR COLORECTAL CANCER SCREENING: Primary | ICD-10-CM

## 2022-08-23 NOTE — TELEPHONE ENCOUNTER
Patient is scheduled for colonoscopy on November 9 , 2022 at [facility]  with Slime Leggett MD  Patient is aware of pre-procedure prep of Miralax/Dulcolax and they will be called the day prior between 2 and 6 pm for time to report for procedure  Pre-procedure prep has been given to the patient  via [address] [facility] [address] mail and e-mail on August 23 , 2022

## 2022-10-26 ENCOUNTER — ANESTHESIA EVENT (OUTPATIENT)
Dept: ANESTHESIOLOGY | Facility: HOSPITAL | Age: 72
End: 2022-10-26

## 2022-10-26 ENCOUNTER — ANESTHESIA (OUTPATIENT)
Dept: ANESTHESIOLOGY | Facility: HOSPITAL | Age: 72
End: 2022-10-26

## 2022-11-03 ENCOUNTER — IMMUNIZATIONS (OUTPATIENT)
Dept: FAMILY MEDICINE CLINIC | Facility: CLINIC | Age: 72
End: 2022-11-03

## 2022-11-03 DIAGNOSIS — Z23 COVID-19 VACCINE ADMINISTERED: Primary | ICD-10-CM

## 2022-11-08 RX ORDER — SODIUM CHLORIDE, SODIUM LACTATE, POTASSIUM CHLORIDE, CALCIUM CHLORIDE 600; 310; 30; 20 MG/100ML; MG/100ML; MG/100ML; MG/100ML
125 INJECTION, SOLUTION INTRAVENOUS CONTINUOUS
Status: CANCELLED | OUTPATIENT
Start: 2022-11-08

## 2022-11-08 RX ORDER — LIDOCAINE HYDROCHLORIDE 10 MG/ML
0.5 INJECTION, SOLUTION EPIDURAL; INFILTRATION; INTRACAUDAL; PERINEURAL ONCE AS NEEDED
Status: CANCELLED | OUTPATIENT
Start: 2022-11-08

## 2022-11-09 ENCOUNTER — HOSPITAL ENCOUNTER (OUTPATIENT)
Dept: CT IMAGING | Facility: HOSPITAL | Age: 72
Discharge: HOME | End: 2022-11-09
Attending: INTERNAL MEDICINE

## 2022-11-09 ENCOUNTER — ANESTHESIA EVENT (OUTPATIENT)
Dept: GASTROENTEROLOGY | Facility: AMBULARY SURGERY CENTER | Age: 72
End: 2022-11-09

## 2022-11-09 ENCOUNTER — HOSPITAL ENCOUNTER (OUTPATIENT)
Dept: GASTROENTEROLOGY | Facility: AMBULARY SURGERY CENTER | Age: 72
Setting detail: OUTPATIENT SURGERY
Discharge: HOME | End: 2022-11-09
Attending: INTERNAL MEDICINE

## 2022-11-09 ENCOUNTER — ANESTHESIA (OUTPATIENT)
Dept: GASTROENTEROLOGY | Facility: AMBULARY SURGERY CENTER | Age: 72
End: 2022-11-09

## 2022-11-09 VITALS
WEIGHT: 150 LBS | TEMPERATURE: 96.8 F | BODY MASS INDEX: 19.88 KG/M2 | OXYGEN SATURATION: 100 % | RESPIRATION RATE: 18 BRPM | HEIGHT: 73 IN | HEART RATE: 61 BPM | SYSTOLIC BLOOD PRESSURE: 126 MMHG | DIASTOLIC BLOOD PRESSURE: 78 MMHG

## 2022-11-09 DIAGNOSIS — Z12.12 ENCOUNTER FOR COLORECTAL CANCER SCREENING: ICD-10-CM

## 2022-11-09 DIAGNOSIS — Z12.11 COLON CANCER SCREENING: ICD-10-CM

## 2022-11-09 DIAGNOSIS — Z12.11 ENCOUNTER FOR COLORECTAL CANCER SCREENING: ICD-10-CM

## 2022-11-09 RX ORDER — LIDOCAINE HYDROCHLORIDE 10 MG/ML
0.5 INJECTION, SOLUTION EPIDURAL; INFILTRATION; INTRACAUDAL; PERINEURAL ONCE AS NEEDED
Status: DISCONTINUED | OUTPATIENT
Start: 2022-11-09 | End: 2022-11-13 | Stop reason: HOSPADM

## 2022-11-09 RX ORDER — SODIUM CHLORIDE, SODIUM LACTATE, POTASSIUM CHLORIDE, CALCIUM CHLORIDE 600; 310; 30; 20 MG/100ML; MG/100ML; MG/100ML; MG/100ML
125 INJECTION, SOLUTION INTRAVENOUS CONTINUOUS
Status: DISCONTINUED | OUTPATIENT
Start: 2022-11-09 | End: 2022-11-13 | Stop reason: HOSPADM

## 2022-11-09 RX ORDER — PROPOFOL 10 MG/ML
INJECTION, EMULSION INTRAVENOUS CONTINUOUS PRN
Status: DISCONTINUED | OUTPATIENT
Start: 2022-11-09 | End: 2022-11-09

## 2022-11-09 RX ORDER — LIDOCAINE HYDROCHLORIDE 10 MG/ML
INJECTION, SOLUTION EPIDURAL; INFILTRATION; INTRACAUDAL; PERINEURAL AS NEEDED
Status: DISCONTINUED | OUTPATIENT
Start: 2022-11-09 | End: 2022-11-09

## 2022-11-09 RX ORDER — PROPOFOL 10 MG/ML
INJECTION, EMULSION INTRAVENOUS AS NEEDED
Status: DISCONTINUED | OUTPATIENT
Start: 2022-11-09 | End: 2022-11-09

## 2022-11-09 RX ORDER — SODIUM CHLORIDE, SODIUM LACTATE, POTASSIUM CHLORIDE, CALCIUM CHLORIDE 600; 310; 30; 20 MG/100ML; MG/100ML; MG/100ML; MG/100ML
INJECTION, SOLUTION INTRAVENOUS CONTINUOUS PRN
Status: DISCONTINUED | OUTPATIENT
Start: 2022-11-09 | End: 2022-11-09

## 2022-11-09 RX ADMIN — PROPOFOL 100 MG: 10 INJECTION, EMULSION INTRAVENOUS at 09:00

## 2022-11-09 RX ADMIN — SODIUM CHLORIDE, SODIUM LACTATE, POTASSIUM CHLORIDE, AND CALCIUM CHLORIDE: .6; .31; .03; .02 INJECTION, SOLUTION INTRAVENOUS at 08:53

## 2022-11-09 RX ADMIN — PROPOFOL 120 MCG/KG/MIN: 10 INJECTION, EMULSION INTRAVENOUS at 09:00

## 2022-11-09 RX ADMIN — LIDOCAINE HYDROCHLORIDE 50 MG: 10 INJECTION, SOLUTION EPIDURAL; INFILTRATION; INTRACAUDAL at 09:00

## 2022-11-09 RX ADMIN — SODIUM CHLORIDE, SODIUM LACTATE, POTASSIUM CHLORIDE, AND CALCIUM CHLORIDE: .6; .31; .03; .02 INJECTION, SOLUTION INTRAVENOUS at 09:25

## 2022-11-09 NOTE — H&P
History and Physical - [facility]  Regino Ceballos 67 y o  male MRN: [id number]    HPI: Regino Ceballos is a 67y o  year old male who presents for evaluation of colon cancer screening  History of colon polyps        Review of Systems    Historical Information   Past Medical History:   Diagnosis Date   • Anemia     B12 def   • Bunion of right foot 09/17/2020   • Colon polyp    • Constipation    • COVID-19 06/2022   • Glaucoma     suspect   • Mixed hyperlipidemia 01/09/2019   • Pre-syncope 05/31/2022   • Spasm of abdominal muscles of left side 01/29/2020     Past Surgical History:   Procedure Laterality Date   • ABDOMINAL SURGERY      "intestine surgery"    • BUNIONECTOMY     • COLONOSCOPY     • HERNIA REPAIR  2010   • HERNIA REPAIR  2016   • HERNIA REPAIR     • [place] CORRJ HALLUX VALGUS W/SESMDC W/DIST METAR OSTEOT Right 10/12/2020    Procedure: BUNIONECTOMY, DOUBLE OSTEOTOMY;  Surgeon: Jt Mccall DPM;  Location: [address] [place] [address] MAIN OR;  Service: Podiatry   • [place] EXCISION TUMOR SOFT TISSUE BACK/FLANK SUBQ 3+CM N/A 11/12/2021    Procedure: EXCISION  BIOPSY LESION/MASS BACK;  Surgeon: Saleem Smith MD;  Location: [facility] MAIN OR;  Service: General   • [place] EXCISION TUMOR SOFT TISSUE SHOULDER SUBQ 3+CM Right 11/12/2021    Procedure: EXCISION BIOPSY TISSUE LESION/MASS UPPER EXTREMITY;  Surgeon: Saleem Smith MD;  [facility]: [facility] MA[facility];  Service: General     Social History   Social History     Substance and Sexual Activity   Alcohol Use Not Currently     Social History     Substance and Sexual Activity   Drug Use Never     Social History     Tobacco Use   Smoking Status Never Smoker   Smokeless Tobacco Never Used     Family History   Problem Relation Age of Onset   • Hypertension Mother    • Uterine cancer Sister        Meds/Allergies     (Not in a hospital admission)      No Known Allergies    Objective     /78   Pulse 69   Temp 97 5 °F (36 4 °C) (Temporal)   Resp 18   Ht 6' 1" (1 854 m)   Wt 68 kg (150 lb)   SpO2 100%   BMI 19 79 kg/m²       PHYSICAL EXAM    Gen: NAD  CV: RRR  CHEST: Clear  ABD: soft, NT/ND  EXT: no edema  Neuro: AAO      ASSESSMENT/PLAN:  This is a 67y o  year old male here for colon cancer screening, has history of colon polyps  PLAN:   Procedure:  Colonoscopy

## 2022-11-09 NOTE — ANESTHESIA PREPROCEDURE EVALUATION
Procedure:  COLONOSCOPY    Relevant Problems   CARDIO   (+) Moderate mixed hyperlipidemia not requiring statin therapy      /RENAL   (+) Benign prostatic hyperplasia with urinary retention      NEURO/PSYCH   (+) Depression   (+) History of intestinal malabsorption        Physical Exam    Airway    Mallampati score: II  TM Distance: >3 FB  Neck ROM: full     Dental   No notable dental hx     Cardiovascular  Rhythm: regular, Rate: normal, Cardiovascular exam normal    Pulmonary  Pulmonary exam normal Breath sounds clear to auscultation,     Other Findings        Anesthesia Plan  ASA Score- 2     Anesthesia Type- IV sedation with anesthesia with ASA Monitors  Additional Monitors:   Airway Plan:     Comment: Discussed risks/benefits, including medication reactions, awareness, aspiration, and serious/life threatening complications  Plan to maintain native airway with IVGA, monitored with EtCO2  Plan Factors-Exercise tolerance (METS): >4 METS  Patient summary reviewed  Patient instructed to abstain from smoking on day of procedure  Patient did not smoke on day of surgery  Induction- intravenous  Postoperative Plan-     Informed Consent- Anesthetic plan and risks discussed with patient  I personally reviewed this patient with the CRNA  Discussed and agreed on the Anesthesia Plan with the CRNA  Cecelia Perez

## 2022-11-09 NOTE — ANESTHESIA POSTPROCEDURE EVALUATION
Post-Op Assessment Note    CV Status:  Stable  Pain Score: 0    Pain management: adequate     Mental Status:  Sleepy   Hydration Status:  Euvolemic   PONV Controlled:  Controlled   Airway Patency:  Patent      Post Op Vitals Reviewed: Yes      Staff: Anesthesiologist, CRNA         No complications documented      /56 (11/09/22 0933)    Temp      Pulse 73 (11/09/22 0933)   Resp 12 (11/09/22 0933)    SpO2 100 % (11/09/22 0933)

## 2022-11-15 ENCOUNTER — TELEPHONE (OUTPATIENT)
Dept: GASTROENTEROLOGY | Facility: AMBULARY SURGERY CENTER | Age: 72
End: 2022-11-15

## 2022-11-15 NOTE — TELEPHONE ENCOUNTER
----- Message from Mark Begum sent at 11/10/2022 10:17 AM EST -----    ----- Message -----  From: Lilliam Trujillo MD  Sent: 11/10/2022   9:33 AM EST  To: Hiral Paz MD, #    Dr Porter [place],   Our common patient has enlarged prostate on CT     Thank you

## 2022-11-15 NOTE — TELEPHONE ENCOUNTER
LVM for pt re: scheduling repeat colonoscopy w/ dr Sarbjit Leger (per dr ayala) due to size of polyps/provided direct phone # in scheduling on pt's phone message

## 2022-11-17 ENCOUNTER — TELEPHONE (OUTPATIENT)
Dept: GASTROENTEROLOGY | Facility: AMBULARY SURGERY CENTER | Age: 72
End: 2022-11-17

## 2022-11-17 NOTE — TELEPHONE ENCOUNTER
Pt returned my call/pt scheduled for repeat colonoscopy (due to Dr Josesito Buenrostro note re: size of polyp) w/ Dr Melissa Franks at [facility] 1/9/2023/bowel prep instructions mailed to pt

## 2023-01-05 DIAGNOSIS — N40.1 BENIGN PROSTATIC HYPERPLASIA WITH URINARY FREQUENCY: ICD-10-CM

## 2023-01-05 DIAGNOSIS — R35.0 BENIGN PROSTATIC HYPERPLASIA WITH URINARY FREQUENCY: ICD-10-CM

## 2023-01-05 RX ORDER — FINASTERIDE 5 MG/1
TABLET, FILM COATED ORAL
Qty: 90 TABLET | Refills: 3 | Status: SHIPPED | OUTPATIENT
Start: 2023-01-05

## 2023-01-06 ENCOUNTER — APPOINTMENT (OUTPATIENT)
Dept: LAB | Facility: CLINIC | Age: 73
End: 2023-01-06

## 2023-01-06 DIAGNOSIS — R97.20 ELEVATED PSA: ICD-10-CM

## 2023-01-06 LAB — PSA SERPL-MCNC: 3 NG/ML (ref 0–4)

## 2023-01-09 ENCOUNTER — ANESTHESIA EVENT (OUTPATIENT)
Dept: GASTROENTEROLOGY | Facility: HOSPITAL | Age: 73
End: 2023-01-09

## 2023-01-09 ENCOUNTER — HOSPITAL ENCOUNTER (OUTPATIENT)
Dept: GASTROENTEROLOGY | Facility: HOSPITAL | Age: 73
Setting detail: OUTPATIENT SURGERY
Discharge: HOME | End: 2023-01-09
Attending: INTERNAL MEDICINE

## 2023-01-09 ENCOUNTER — ANESTHESIA (OUTPATIENT)
Dept: GASTROENTEROLOGY | Facility: HOSPITAL | Age: 73
End: 2023-01-09

## 2023-01-09 VITALS
TEMPERATURE: 97.5 F | WEIGHT: 150 LBS | SYSTOLIC BLOOD PRESSURE: 133 MMHG | HEART RATE: 78 BPM | HEIGHT: 73 IN | DIASTOLIC BLOOD PRESSURE: 93 MMHG | OXYGEN SATURATION: 99 % | BODY MASS INDEX: 19.88 KG/M2 | RESPIRATION RATE: 17 BRPM

## 2023-01-09 DIAGNOSIS — Z86.010 HX OF COLONIC POLYPS: ICD-10-CM

## 2023-01-09 RX ORDER — PROPOFOL 10 MG/ML
INJECTION, EMULSION INTRAVENOUS AS NEEDED
Status: DISCONTINUED | OUTPATIENT
Start: 2023-01-09 | End: 2023-01-09

## 2023-01-09 RX ORDER — PROPOFOL 10 MG/ML
INJECTION, EMULSION INTRAVENOUS CONTINUOUS PRN
Status: DISCONTINUED | OUTPATIENT
Start: 2023-01-09 | End: 2023-01-09

## 2023-01-09 RX ORDER — LIDOCAINE HYDROCHLORIDE 10 MG/ML
INJECTION, SOLUTION EPIDURAL; INFILTRATION; INTRACAUDAL; PERINEURAL AS NEEDED
Status: DISCONTINUED | OUTPATIENT
Start: 2023-01-09 | End: 2023-01-09

## 2023-01-09 RX ORDER — SODIUM CHLORIDE, SODIUM LACTATE, POTASSIUM CHLORIDE, CALCIUM CHLORIDE 600; 310; 30; 20 MG/100ML; MG/100ML; MG/100ML; MG/100ML
INJECTION, SOLUTION INTRAVENOUS CONTINUOUS PRN
Status: DISCONTINUED | OUTPATIENT
Start: 2023-01-09 | End: 2023-01-09

## 2023-01-09 RX ADMIN — PROPOFOL 100 MG: 10 INJECTION, EMULSION INTRAVENOUS at 08:46

## 2023-01-09 RX ADMIN — PROPOFOL 40 MG: 10 INJECTION, EMULSION INTRAVENOUS at 08:49

## 2023-01-09 RX ADMIN — Medication 40 MG: at 08:48

## 2023-01-09 RX ADMIN — PROPOFOL 80 MCG/KG/MIN: 10 INJECTION, EMULSION INTRAVENOUS at 08:46

## 2023-01-09 RX ADMIN — PROPOFOL 20 MG: 10 INJECTION, EMULSION INTRAVENOUS at 09:13

## 2023-01-09 RX ADMIN — LIDOCAINE HYDROCHLORIDE 50 MG: 10 INJECTION, SOLUTION EPIDURAL; INFILTRATION; INTRACAUDAL at 08:46

## 2023-01-09 RX ADMIN — SODIUM CHLORIDE, SODIUM LACTATE, POTASSIUM CHLORIDE, AND CALCIUM CHLORIDE: .6; .31; .03; .02 INJECTION, SOLUTION INTRAVENOUS at 08:41

## 2023-01-09 NOTE — ANESTHESIA PREPROCEDURE EVALUATION
Procedure:  COLONOSCOPY    Relevant Problems   ANESTHESIA (within normal limits)      CARDIO   (+) Moderate mixed hyperlipidemia not requiring statin therapy      /RENAL   (+) Benign prostatic hyperplasia with urinary retention      NEURO/PSYCH   (+) Depression      Other   (+) Encounter for colorectal cancer screening        Physical Exam    Airway    Mallampati score: II  TM Distance: >3 FB  Neck ROM: full     Dental       Cardiovascular  Rhythm: regular, Rate: normal, Cardiovascular exam normal    Pulmonary  Pulmonary exam normal Breath sounds clear to auscultation,     Other Findings        Anesthesia Plan  ASA Score- 2     Anesthesia Type- IV sedation with anesthesia with ASA Monitors  Additional Monitors:   Airway Plan:           Plan Factors-Exercise tolerance (METS): >4 METS  Chart reviewed  Existing labs reviewed  Patient summary reviewed  Patient is not a current smoker  Induction-     Postoperative Plan-     Informed Consent- Anesthetic plan and risks discussed with patient  I personally reviewed this patient with the CRNA  Discussed and agreed on the Anesthesia Plan with the DAVEY Garay

## 2023-01-09 NOTE — ANESTHESIA POSTPROCEDURE EVALUATION
Post-Op Assessment Note    CV Status:  Stable  Pain Score: 0    Pain management: adequate     Mental Status:  Sleepy   Hydration Status:  Stable and euvolemic   PONV Controlled:  None   Airway Patency:  Patent       Staff: CRNA         No notable events documented      BP      Temp     Pulse     Resp      SpO2

## 2023-01-09 NOTE — H&P
History and Physical - [facility]  Franck Caldera 67 y o  male MRN: [id number]        HPI: 70-year-old male with history of colon polyps had colonoscopy couple of months ago but it was incomplete due to redundant tortuous colon    CT colonography showed polyp in the redundant sigmoid    Historical Information   Past Medical History:   Diagnosis Date   • Anemia     B12 def   • Bunion of right foot 09/17/2020   • Colon polyp    • Constipation    • COVID-19 06/2022   • Glaucoma     suspect   • Mixed hyperlipidemia 01/09/2019   • Pre-syncope 05/31/2022   • Spasm of abdominal muscles of left side 01/29/2020     Past Surgical History:   Procedure Laterality Date   • ABDOMINAL SURGERY      "intestine surgery"    • BUNIONECTOMY     • COLONOSCOPY     • HERNIA REPAIR  2010   • HERNIA REPAIR  2016   • HERNIA REPAIR     • [place] CORRJ HALLUX VALGUS W/SESMDC W/DIST METAR OSTEOT Right 10/12/2020    Procedure: BUNIONECTOMY, DOUBLE OSTEOTOMY;  Surgeon: Sherri Laughlin DPM;  Location: [address] [place] [address] MAIN OR;  Service: Podiatry   • [place] EXCISION TUMOR SOFT TIS BACK/FLANK SUBQ 3 CM/> N/A 11/12/2021    Procedure: EXCISION  BIOPSY LESION/MASS BACK;  Surgeon: Jered Henriquez MD;  [facility] MAIN [facility];  Service: General   • [place] EXCISION TUMOR SOFT TISSUE SHOULDER SUBQ 3 CM/> Right 11/12/2021    Procedure: EXCISION BIOPSY TISSUE LESION/MASS UPPER EXTREMITY;  Surgeon: Jered Henriquez MD;  [facility] MAIN [facility];  Service: General     Social History   Social History     Substance and Sexual Activity   Alcohol Use Not Currently     Social History     Substance and Sexual Activity   Drug Use Never     Social History     Tobacco Use   Smoking Status Never   Smokeless Tobacco Never     Family History   Problem Relation Age of Onset   • Hypertension Mother    • Uterine cancer Sister        Meds/Allergies     (Not in a hospital admission)      No Known Allergies    Objective     Blood pressure 140/75, pulse 76, temperature (!) 97 °F (36 1 °C), temperature source Temporal, resp  rate 18, height 6' 1" (1 854 m), weight 68 kg (150 lb), SpO2 99 %      PHYSICAL EXAM:    Gen: NAD  CV: S1 & S2 normal, RRR  CHEST: Clear to auscultate  ABD: soft, NT/ND, good bowel sounds  EXT: no edema    ASSESSMENT:     History of colon polyps    PLAN:    Colonoscopy

## 2023-01-19 ENCOUNTER — APPOINTMENT (EMERGENCY)
Dept: ULTRASOUND IMAGING | Facility: HOSPITAL | Age: 73
End: 2023-01-19

## 2023-01-19 ENCOUNTER — HOSPITAL ENCOUNTER (EMERGENCY)
Facility: HOSPITAL | Age: 73
Discharge: HOME | End: 2023-01-19
Attending: EMERGENCY MEDICINE

## 2023-01-19 ENCOUNTER — TELEPHONE (OUTPATIENT)
Dept: UROLOGY | Facility: AMBULATORY SURGERY CENTER | Age: 73
End: 2023-01-19

## 2023-01-19 ENCOUNTER — APPOINTMENT (EMERGENCY)
Dept: CT IMAGING | Facility: HOSPITAL | Age: 73
End: 2023-01-19

## 2023-01-19 VITALS
HEIGHT: 78 IN | SYSTOLIC BLOOD PRESSURE: 133 MMHG | OXYGEN SATURATION: 100 % | HEART RATE: 79 BPM | WEIGHT: 152.12 LBS | TEMPERATURE: 99.9 F | RESPIRATION RATE: 16 BRPM | DIASTOLIC BLOOD PRESSURE: 65 MMHG | BODY MASS INDEX: 17.6 KG/M2

## 2023-01-19 DIAGNOSIS — N45.1 ACUTE EPIDIDYMITIS: ICD-10-CM

## 2023-01-19 DIAGNOSIS — R31.0 GROSS HEMATURIA: ICD-10-CM

## 2023-01-19 DIAGNOSIS — N39.0 UTI (URINARY TRACT INFECTION): Primary | ICD-10-CM

## 2023-01-19 LAB
ALBUMIN SERPL BCP-MCNC: 3.9 G/DL (ref 3.5–5)
ALP SERPL-CCNC: 58 U/L (ref 34–104)
ALT SERPL W P-5'-P-CCNC: 10 U/L (ref 7–52)
ANION GAP SERPL CALCULATED.3IONS-SCNC: 7 MMOL/L (ref 4–13)
APTT PPP: 28 SECONDS (ref 23–37)
AST SERPL W P-5'-P-CCNC: 17 U/L (ref 13–39)
BACTERIA UR QL AUTO: ABNORMAL /HPF
BASOPHILS # BLD AUTO: 0.03 THOUSANDS/ÂΜL (ref 0–0.1)
BASOPHILS NFR BLD AUTO: 0 % (ref 0–1)
BILIRUB SERPL-MCNC: 0.7 MG/DL (ref 0.2–1)
BILIRUB UR QL STRIP: NEGATIVE
BUN SERPL-MCNC: 17 MG/DL (ref 5–25)
CALCIUM SERPL-MCNC: 9.2 MG/DL (ref 8.4–10.2)
CHLORIDE SERPL-SCNC: 108 MMOL/L (ref 96–108)
CK SERPL-CCNC: 68 U/L (ref 39–308)
CLARITY UR: ABNORMAL
CO2 SERPL-SCNC: 24 MMOL/L (ref 21–32)
COLOR UR: YELLOW
CREAT SERPL-MCNC: 0.94 MG/DL (ref 0.6–1.3)
EOSINOPHIL # BLD AUTO: 0.05 THOUSAND/ÂΜL (ref 0–0.61)
EOSINOPHIL NFR BLD AUTO: 0 % (ref 0–6)
ERYTHROCYTE [DISTWIDTH] IN BLOOD BY AUTOMATED COUNT: 16.8 % (ref 11.6–15.1)
GFR SERPL CREATININE-BSD FRML MDRD: 80 ML/MIN/1.73SQ M
GLUCOSE SERPL-MCNC: 117 MG/DL (ref 65–140)
GLUCOSE UR STRIP-MCNC: NEGATIVE MG/DL
HCT VFR BLD AUTO: 41.7 % (ref 36.5–49.3)
HGB BLD-MCNC: 12.9 G/DL (ref 12–17)
HGB UR QL STRIP.AUTO: ABNORMAL
IMM GRANULOCYTES # BLD AUTO: 0.05 THOUSAND/UL (ref 0–0.2)
IMM GRANULOCYTES NFR BLD AUTO: 0 % (ref 0–2)
INR PPP: 1.02 (ref 0.84–1.19)
KETONES UR STRIP-MCNC: NEGATIVE MG/DL
LEUKOCYTE ESTERASE UR QL STRIP: ABNORMAL
LIPASE SERPL-CCNC: 20 U/L (ref 11–82)
LYMPHOCYTES # BLD AUTO: 0.44 THOUSANDS/ÂΜL (ref 0.6–4.47)
LYMPHOCYTES NFR BLD AUTO: 4 % (ref 14–44)
MCH RBC QN AUTO: 21.9 PG (ref 26.8–34.3)
MCHC RBC AUTO-ENTMCNC: 30.9 G/DL (ref 31.4–37.4)
MCV RBC AUTO: 71 FL (ref 82–98)
MONOCYTES # BLD AUTO: 0.78 THOUSAND/ÂΜL (ref 0.17–1.22)
MONOCYTES NFR BLD AUTO: 6 % (ref 4–12)
MUCOUS THREADS UR QL AUTO: ABNORMAL
NEUTROPHILS # BLD AUTO: 11.07 THOUSANDS/ÂΜL (ref 1.85–7.62)
NEUTS SEG NFR BLD AUTO: 90 % (ref 43–75)
NITRITE UR QL STRIP: POSITIVE
NON-SQ EPI CELLS URNS QL MICRO: ABNORMAL /HPF
NRBC BLD AUTO-RTO: 0 /100 WBCS
PH UR STRIP.AUTO: 5.5 [PH]
PLATELET # BLD AUTO: 237 THOUSANDS/UL (ref 149–390)
PMV BLD AUTO: 9.9 FL (ref 8.9–12.7)
POTASSIUM SERPL-SCNC: 3.6 MMOL/L (ref 3.5–5.3)
PROT SERPL-MCNC: 6.8 G/DL (ref 6.4–8.4)
PROT UR STRIP-MCNC: ABNORMAL MG/DL
PROTHROMBIN TIME: 13.6 SECONDS (ref 11.6–14.5)
RBC # BLD AUTO: 5.9 MILLION/UL (ref 3.88–5.62)
RBC #/AREA URNS AUTO: ABNORMAL /HPF
SODIUM SERPL-SCNC: 139 MMOL/L (ref 135–147)
SP GR UR STRIP.AUTO: 1.03 (ref 1–1.03)
UROBILINOGEN UR STRIP-ACNC: <2 MG/DL
WBC # BLD AUTO: 12.42 THOUSAND/UL (ref 4.31–10.16)
WBC #/AREA URNS AUTO: ABNORMAL /HPF

## 2023-01-19 RX ORDER — LEVOFLOXACIN 750 MG/1
750 TABLET ORAL DAILY
Qty: 10 TABLET | Refills: 0 | Status: SHIPPED | OUTPATIENT
Start: 2023-01-19 | End: 2023-01-29

## 2023-01-19 RX ORDER — MORPHINE SULFATE 4 MG/ML
4 INJECTION, SOLUTION INTRAMUSCULAR; INTRAVENOUS ONCE
Status: COMPLETED | OUTPATIENT
Start: 2023-01-19 | End: 2023-01-19

## 2023-01-19 RX ORDER — ONDANSETRON 2 MG/ML
4 INJECTION INTRAMUSCULAR; INTRAVENOUS ONCE
Status: COMPLETED | OUTPATIENT
Start: 2023-01-19 | End: 2023-01-19

## 2023-01-19 RX ADMIN — CEFTRIAXONE SODIUM 1000 MG: 10 INJECTION, POWDER, FOR SOLUTION INTRAVENOUS at 05:00

## 2023-01-19 RX ADMIN — ONDANSETRON 4 MG: 2 INJECTION INTRAMUSCULAR; INTRAVENOUS at 04:02

## 2023-01-19 RX ADMIN — MORPHINE SULFATE 4 MG: 4 INJECTION INTRAVENOUS at 04:02

## 2023-01-19 RX ADMIN — IOHEXOL 100 ML: 350 INJECTION, SOLUTION INTRAVENOUS at 05:18

## 2023-01-19 NOTE — ED PROVIDER NOTES
History  Chief Complaint   Patient presents with   • Blood in Urine     Had pain in right abdomen and right sided testicular pain and swelling yesterday  Noticed blood in urine today     76-year-old male history of BPH, multiple inguinal hernia surgeries with mesh removal presenting with right testicle pain and hematuria  Patient states that yesterday he started noticing some pain in his right testicle that started at rest   Pain increased throughout the day  Also noted some lower abdominal and right lower back pain associated with this  A few hours prior to presentation he went to use the bathroom and noticed blood in his urine which prompted him to come to the ED  Did have a bit of dysuria with this  Has never had the symptoms before  Denies trauma to the area  No history of vasectomy  Last hernia related surgery was many years ago  No fevers, chills, nausea, vomiting, diarrhea, constipation, blood in stool  Prior to Admission Medications   Prescriptions Last Dose Informant Patient Reported? Taking?    Ascorbic Acid (VITAMIN C PO)   Yes No   Sig: Take by mouth   Cyanocobalamin (VITAMIN B-12) 2500 MCG SUBL   Yes No   Sig: Place 1 tablet under the tongue daily   Multiple Vitamin (MULTIVITAMIN) tablet   Yes No   Sig: Take 1 tablet by mouth daily   Multiple Vitamins-Minerals (PRESERVISION AREDS PO)   Yes No   Sig: Take 1 capsule by mouth 2 (two) times a day   VITAMIN D PO   Yes No   Sig: Take by mouth   docusate sodium (COLACE) 100 mg capsule   Yes No   Sig: Take 100 mg by mouth as needed    dorzolamide-timolol (COSOPT) 22 3-6 8 MG/ML ophthalmic solution   Yes No   finasteride (PROSCAR) 5 mg tablet   No No   Sig: TAKE 1 TABLET BY MOUTH  DAILY   latanoprost (XALATAN) 0 005 % ophthalmic solution   Yes No   Sig: Administer 1 drop to both eyes daily at bedtime       Facility-Administered Medications: None       Past Medical History:   Diagnosis Date   • Anemia     B12 def   • Bunion of right foot 09/17/2020   • Colon polyp    • Constipation    • COVID-19 06/2022   • Glaucoma     suspect   • Mixed hyperlipidemia 01/09/2019   • Pre-syncope 05/31/2022   • Spasm of abdominal muscles of left side 01/29/2020       Past Surgical History:   Procedure Laterality Date   • ABDOMINAL SURGERY      "intestine surgery"    • BUNIONECTOMY     • COLONOSCOPY     • HERNIA REPAIR  2010   • HERNIA REPAIR  2016   • HERNIA REPAIR     • MA CORRJ [address] W/SESMDC W/DIST METAR OSTEOT Right 10/12/2020    Procedure: BUNIONECTOMY, DOUBLE OSTEOTOMY;  Surgeon: Aixa Sanchez DPM;  Location: [facility] MAIN OR;  Service: Podiatry   • MA EXCISION TUMOR SOFT TIS BACK/FLANK SUBQ 3 CM/> N/A 11/12/2021    Procedure: EXCISION  BIOPSY LESION/MASS BACK;  Surgeon: Susana Lees MD;  Location: [facility] MAIN OR;  Service: General   • MA EXCISION TUMOR SOFT TISSUE SHOULDER SUBQ 3 CM/> Right 11/12/2021    Procedure: EXCISION BIOPSY TISSUE LESION/MASS UPPER EXTREMITY;  Surgeon: Susana Lees MD;  Location: [facility] MAIN OR;  Service: General       Family History   Problem Relation Age of Onset   • Hypertension Mother    • Uterine cancer Sister      I have reviewed and agree with the history as documented  E-Cigarette/Vaping   • E-Cigarette Use Never User      E-Cigarette/Vaping Substances   • Nicotine No    • THC No    • CBD No    • Flavoring No    • Other No      Social History     Tobacco Use   • Smoking status: Never   • Smokeless tobacco: Never   Vaping Use   • Vaping Use: Never used   Substance Use Topics   • Alcohol use: Not Currently   • Drug use: Never        Review of Systems   Constitutional: Negative for activity change, fever and unexpected weight change  HENT: Negative for postnasal drip and rhinorrhea  Eyes: Negative for visual disturbance  Respiratory: Negative for cough, chest tightness and shortness of breath  Cardiovascular: Negative for chest pain and palpitations  Gastrointestinal: Positive for abdominal pain   Negative for blood in stool, constipation, diarrhea, nausea and vomiting  Genitourinary: Positive for hematuria, scrotal swelling and testicular pain  Negative for dysuria  Musculoskeletal: Positive for back pain  Skin: Negative for color change and wound  Allergic/Immunologic: Negative for immunocompromised state  Neurological: Negative for dizziness and syncope  Hematological: Does not bruise/bleed easily  Psychiatric/Behavioral: Negative for dysphoric mood  The patient is not nervous/anxious  All other systems reviewed and are negative  Physical Exam  ED Triage Vitals   Temperature Pulse Respirations Blood Pressure SpO2   01/19/23 0326 01/19/23 0322 01/19/23 0322 01/19/23 0322 01/19/23 0322   99 9 °F (37 7 °C) 89 20 154/82 99 %      Temp Source Heart Rate Source Patient Position - Orthostatic VS BP Location FiO2 (%)   01/19/23 0326 01/19/23 0322 01/19/23 0322 01/19/23 0322 --   Oral Monitor Sitting Right arm       Pain Score       01/19/23 0322       6             Orthostatic Vital Signs  Vitals:    01/19/23 0322 01/19/23 0614   BP: 154/82 133/65   Pulse: 89 79   Patient Position - Orthostatic VS: Sitting Lying       Physical Exam  Vitals and nursing note reviewed  Exam conducted with a chaperone present (Wife at bedside  Declined offer of male chaperone for  exam)  Constitutional:       General: He is in acute distress  Appearance: He is not toxic-appearing or diaphoretic  HENT:      Head: Normocephalic and atraumatic  Right Ear: External ear normal       Left Ear: External ear normal       Nose: Nose normal       Mouth/Throat:      Mouth: Mucous membranes are moist    Eyes:      General: No scleral icterus  Extraocular Movements: Extraocular movements intact  Conjunctiva/sclera: Conjunctivae normal    Cardiovascular:      Rate and Rhythm: Normal rate and regular rhythm  Pulses: Normal pulses  Radial pulses are 2+ on the right side          Dorsalis pedis pulses are 2+ on the right side and 2+ on the left side  Heart sounds: Normal heart sounds, S1 normal and S2 normal  No murmur heard  Pulmonary:      Effort: Pulmonary effort is normal  No respiratory distress  Breath sounds: Normal breath sounds  No stridor  No wheezing  Abdominal:      General: Abdomen is flat  A surgical scar is present  Bowel sounds are normal       Palpations: Abdomen is soft  Tenderness: There is abdominal tenderness in the suprapubic area  There is no right CVA tenderness, left CVA tenderness, guarding or rebound  Genitourinary:     Penis: Normal and uncircumcised  Testes:         Right: Tenderness and swelling present  Epididymis:      Right: Tenderness present  Sourav stage (genital): 5  Comments: Right testicle riding higher, oblique lay  Musculoskeletal:         General: Normal range of motion  Cervical back: Normal range of motion  Skin:     General: Skin is warm and dry  Coloration: Skin is not jaundiced  Neurological:      General: No focal deficit present  Mental Status: He is alert and oriented to person, place, and time     Psychiatric:         Mood and Affect: Mood normal          ED Medications  Medications   ondansetron (ZOFRAN) injection 4 mg (4 mg Intravenous Given 1/19/23 0402)   morphine injection 4 mg (4 mg Intravenous Given 1/19/23 0402)   ceftriaxone (ROCEPHIN) 1 g/50 mL in dextrose IVPB (0 mg Intravenous Stopped 1/19/23 0528)   iohexol (OMNIPAQUE) 350 MG/ML injection (SINGLE-DOSE) 100 mL (100 mL Intravenous Given 1/19/23 0518)       Diagnostic Studies  Results Reviewed     Procedure Component Value Units Date/Time    Urine Microscopic [[id number]]  (Abnormal) Collected: 01/19/23 0418    Lab Status: Final result Specimen: Urine, Clean Catch Updated: 01/19/23 0535     RBC, UA 30-50 /hpf      WBC, UA Innumerable /hpf      Epithelial Cells None Seen /hpf      Bacteria, UA Innumerable /hpf      MUCUS THREADS Occasional Urine culture [[id number]] Collected: 01/19/23 0418    Lab Status: In process Specimen: Urine, Clean Catch Updated: 01/19/23 0535    CBC and differential [[id number]]  (Abnormal) Collected: 01/19/23 0401    Lab Status: Final result Specimen: Blood from Arm, Right Updated: 01/19/23 0531     WBC 12 42 Thousand/uL      RBC 5 90 Million/uL      Hemoglobin 12 9 g/dL      Hematocrit 41 7 %      MCV 71 fL      MCH 21 9 pg      MCHC 30 9 g/dL      RDW 16 8 %      MPV 9 9 fL      Platelets 375 Thousands/uL      nRBC 0 /100 WBCs      Neutrophils Relative 90 %      Immat GRANS % 0 %      Lymphocytes Relative 4 %      Monocytes Relative 6 %      Eosinophils Relative 0 %      Basophils Relative 0 %      Neutrophils Absolute 11 07 Thousands/µL      Immature Grans Absolute 0 05 Thousand/uL      Lymphocytes Absolute 0 44 Thousands/µL      Monocytes Absolute 0 78 Thousand/µL      Eosinophils Absolute 0 05 Thousand/µL      Basophils Absolute 0 03 Thousands/µL     Narrative: This is an appended report  These results have been appended to a previously verified report      Comprehensive metabolic panel [[id number]] Collected: 01/19/23 0401    Lab Status: Final result Specimen: Blood from Arm, Right Updated: 01/19/23 0445     Sodium 139 mmol/L      Potassium 3 6 mmol/L      Chloride 108 mmol/L      CO2 24 mmol/L      ANION GAP 7 mmol/L      BUN 17 mg/dL      Creatinine 0 94 mg/dL      Glucose 117 mg/dL      Calcium 9 2 mg/dL      AST 17 U/L      ALT 10 U/L      Alkaline Phosphatase 58 U/L      Total Protein 6 8 g/dL      Albumin 3 9 g/dL      Total Bilirubin 0 70 mg/dL      eGFR 80 ml/min/1 73sq m     Narrative:      [place] guidelines for Chronic Kidney Disease (CKD):   •  Stage 1 with normal or high GFR (GFR > 90 mL/min/1 73 square meters)  •  Stage 2 Mild CKD (GFR = 60-89 mL/min/1 73 square meters)  •  Stage 3A Moderate CKD (GFR = 45-59 mL/min/1 73 square meters)  •  Stage 3B Moderate CKD (GFR = 30-44 mL/min/1 73 square meters)  •  Stage 4 Severe CKD (GFR = 15-29 mL/min/1 73 square meters)  •  Stage 5 End Stage CKD (GFR <15 mL/min/1 73 square meters)  Note: GFR calculation is accurate only with a steady state creatinine    Lipase [[id number]]  (Normal) Collected: 01/19/23 0401    Lab Status: Final result Specimen: Blood from Arm, Right Updated: 01/19/23 0445     Lipase 20 u/L     CK Total with Reflex CKMB [[id number]]  (Normal) Collected: 01/19/23 0401    Lab Status: Final result Specimen: Blood from Arm, Right Updated: 01/19/23 0443     Total CK 68 U/L     Protime-INR [[id number]]  (Normal) Collected: 01/19/23 0401    Lab Status: Final result Specimen: Blood from Arm, Right Updated: 01/19/23 0435     Protime 13 6 seconds      INR 1 02    APTT [[id number]]  (Normal) Collected: 01/19/23 0401    Lab Status: Final result Specimen: Blood from Arm, Right Updated: 01/19/23 0435     PTT 28 seconds     UA w Reflex to Microscopic w Reflex to Culture [[id number]]  (Abnormal) Collected: 01/19/23 0418    Lab Status: Final result Specimen: Urine, Clean Catch Updated: 01/19/23 0427     Color, UA Yellow     Clarity, UA Turbid     Specific [place], UA 1 027     pH, UA 5 5     Leukocytes, UA Moderate     Nitrite, UA Positive     Protein, UA Trace mg/dl      Glucose, UA Negative mg/dl      Ketones, UA Negative mg/dl      Urobilinogen, UA <2 0 mg/dl      Bilirubin, UA Negative     Occult Blood, UA Moderate                 CT renal protocol   Final Result by Dmitry Dominguez MD (01/19 0406)      No CT abnormality identified to account for hematuria  Severe prostatomegaly  Workstation performed: [id number]         US scrotum and testicles   Final Result by Kathryn Dominguez DO (01/19 6139)      Hyperemia in the right epididymis suspicious for epididymitis  Follow-up with urology is recommended  Bilateral complex hydroceles  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 or other worrisome findings  In the absence of any other risk factors for testicular cancer (e g , personal history of testicular cancer, father or brother with testicular cancer, history    of cryptorchidism for maldescent, testicular atrophy, or other risk factors), no further imaging or biochemical follow-up is necessary; all that is recommended is routine monthly testicular self-examination  However if the patient has risk factors for    testicular cancer, evaluation and determination of an optimal follow-up strategy is deferred to urologist  (Reference: AJR 2016: [id number] )      The study was marked in EPIC for immediate notification  Workstation performed: [id number]               Procedures  Procedures      ED Course  ED Course as of 01/19/23 0700   Thu Jan 19, 2023   0421 WBC(!): 12 42   0444 Nitrite, UA(!): Positive   0444 Blood, UA(!): Moderate   0446 Comprehensive metabolic panel  wnl   6512 US scrotum and testicles  Hyperemia in the right epididymis suspicious for epididymitis  Follow-up with urology is recommended      Bilateral complex hydroceles  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 or other worrisome findings  In the absence of any other risk factors for testicular cancer (e g , personal history of testicular cancer, father or brother with testicular cancer, history  of cryptorchidism for maldescent, testicular atrophy, or other risk factors), no further imaging or biochemical follow-up is necessary; all that is recommended is routine monthly testicular self-examination  However if the patient has risk factors for testicular cancer, evaluation and determination of an optimal follow-up strategy is deferred to urologist    2939 CT renal protocol  No CT abnormality identified to account for hematuria        Severe prostatomegaly                               SBIRT 22yo+    Flowsheet Row Most Recent Value   SBIRT (25 yo +)    In order to provide better care to our patients, we are screening all of our patients for alcohol and drug use  Would it be okay to ask you these screening questions? Yes Filed at: 01/19/2023 0430   Initial Alcohol Screen: US AUDIT-C     1  How often do you have a drink containing alcohol? 0 Filed at: 01/19/2023 0430   2  How many drinks containing alcohol do you have on a typical day you are drinking? 0 Filed at: 01/19/2023 0430   3a  Male UNDER 65: How often do you have five or more drinks on one occasion? 0 Filed at: 01/19/2023 0430   3b  FEMALE Any Age, or MALE 65+: How often do you have 4 or more drinks on one occassion? 0 Filed at: 01/19/2023 0430   Audit-C Score 0 Filed at: 01/19/2023 0430   BRYAN: How many times in the past year have you    Used an illegal drug or used a prescription medication for non-medical reasons? Never Filed at: 01/19/2023 0430                Medical Decision Making  Initial Impression: Patient on patient's complaints and physical exam worried about testicular torsion however somewhat less likely given that he was not very acute pain and he is not exquisitely tender on exam   Could also be epididymitis since his epididymis is tender  Hematuria and lower abdominal pain could easily be from a kidney stone though this would not explain the testicular findings  Also possible that he is having some complication from his prior hernia surgeries  Initial Work Up: Ultrasound scrotum, CT renal protocol, lipase, CBC, CMP, coags    Final Impression: Ultrasound did not show torsion but did show evidence of epididymitis  CT scan did not show any acute pathology  UA was positive for UTI  Based on patient's age and sex and underlying BPH, likely patient has a UTI that due to the BPH backed up and spread to now cause epididymitis which is the cause of patient's right testicle pain  Give the patient a dose of ceftriaxone while he was here but will transition him to oral levofloxacin and have him follow-up with urology        Acute epididymitis: complicated acute illness or injury  Gross hematuria: acute illness or injury  UTI (urinary tract infection): acute illness or injury  Amount and/or Complexity of Data Reviewed  Independent Historian: spouse  Labs: ordered  Decision-making details documented in ED Course  Radiology: ordered  Decision-making details documented in ED Course  Risk  Prescription drug management  Risk Details: Evaluated patient for testicular torsion, infected renal stone, complicated UTI          Disposition  Final diagnoses:   Acute epididymitis   Gross hematuria   UTI (urinary tract infection)     Time reflects when diagnosis was documented in both MDM as applicable and the Disposition within this note     Time User Action Codes Description Comment    1/19/2023  6:19 AM Cyrena Spruce Add [N45 1] Acute epididymitis     1/19/2023  6:19 AM Cyrena Spruce Add [R31 0] Gross hematuria     1/19/2023  6:20 AM Cyrena Spruce Add [N39 0] UTI (urinary tract infection)     1/19/2023  6:20 AM Cyrena Spruce Modify [N45 1] Acute epididymitis     1/19/2023  6:20 AM Cyrena Spruce Modify [N39 0] UTI (urinary tract infection)       ED Disposition     ED Disposition   Discharge    Condition   Stable    Date/Time   Thu Jan 19, 2023  6:21 AM    Comment   Ramone Graham discharge to home/self care                 Follow-up Information     Follow up With Specialties Details Why Contact Info Additional [address] [facility] Schedule an appointment as soon as possible for a visit  To have your prostate evaluated and to discuss the findings of UTI with epididymitis [facility] [address] [facility] [id number]  [address] [place] [address] For Urology [place], [facility], [place], [address]   [phone]    Janet Bella MD Internal Medicine Call  Speak with your PCP to discuss today's episode see if there is any need for further care and evaluation [address] [place]  Emergency Department Emergency Medicine Go to  As needed, If symptoms worsen [address] [facility] [address] [facility], [address], [place], [place], [address]          Discharge Medication List as of 1/19/2023  6:22 AM      START taking these medications    Details   levofloxacin (Levaquin) 750 mg tablet Take 1 tablet (750 mg total) by mouth daily for 10 days, Starting Thu 1/19/2023, Until Sun 1/29/2023, Normal         CONTINUE these medications which have NOT CHANGED    Details   Ascorbic Acid (VITAMIN C PO) Take by mouth, Historical Med      Cyanocobalamin (VITAMIN B-12) 2500 MCG SUBL Place 1 tablet under the tongue daily, Historical Med      docusate sodium (COLACE) 100 mg capsule Take 100 mg by mouth as needed , Historical Med      dorzolamide-timolol (COSOPT) 22 3-6 8 MG/ML ophthalmic solution Starting Mon 4/19/2021, Historical Med      finasteride (PROSCAR) 5 mg tablet TAKE 1 TABLET BY MOUTH  DAILY, Normal      latanoprost (XALATAN) 0 005 % ophthalmic solution Administer 1 drop to both eyes daily at bedtime , Starting Mon 6/22/2020, Historical Med      Multiple Vitamin (MULTIVITAMIN) tablet Take 1 tablet by mouth daily, Historical Med      Multiple Vitamins-Minerals (PRESERVISION AREDS PO) Take 1 capsule by mouth 2 (two) times a day, Historical Med      VITAMIN D PO Take by mouth, Historical Med           No discharge procedures on file  PDMP Review       Value Time User    PDMP Reviewed  Yes 1/19/2023  3:07 Briana Mahajan MD           ED Provider  Attending physically available and evaluated Mirtha Ramirez  KEAGAN managed the patient along with the ED Attending      Electronically Signed by         Rody Johnson MD  01/19/23 0700

## 2023-01-19 NOTE — TELEPHONE ENCOUNTER
Pt is under the care of Paulette Gonsales and was last seen 01/12/23    Pt was seen in the ER for a UTI and testicle pain  Pt states that he is he is feeling better since being on the medication but was told to make an appointment as soon as possible       Pt can be reached at [phone]

## 2023-01-19 NOTE — ED ATTENDING ATTESTATION
1/19/2023  IDonovan MD, saw and evaluated the patient  I have discussed the patient with the resident/non-physician practitioner and agree with the resident's/non-physician practitioner's findings, Plan of Care, and MDM as documented in the resident's/non-physician practitioner's note, except where noted  All available labs and Radiology studies were reviewed  I was present for key portions of any procedure(s) performed by the resident/non-physician practitioner and I was immediately available to provide assistance  At this point I agree with the current assessment done in the Emergency Department  I have conducted an independent evaluation of this patient a history and physical is as follows:  Patient is a 67year old male with onset of RLQ pain with radiation to R flank and testicle since yesterday AM with hematuria  No N/v/D  No GI bleeding  Pain worse with urination  Has had prior hernia surgery in the past  Was last seen in this ED on 5/28/22 for pre-syncope  [facility] [place] [facility] website checked on this patient and last Rx filled was on 6/9/22 for belsomra for 10 day supply  NCAT  Lungs clear  Heart regular without murmur  Abdomen soft with ?RLQ tenderness and R CVAT  (+) R testicular tenderness  ?cremasteric reflex  No rash noted  No edema  DDx including but not limited to: appendicitis, gastroenteritis, gastritis, PUD, GERD, gastroparesis, hepatitis, pancreatitis, colitis, enteritis, diverticulitis, food poisoning, epiploic appendagitis, mesenteric adenitis, mesenteric panniculitis, mesenteric ischemia, IBD, IBS, ileus, bowel obstruction, volvulus, internal hernia, cholecystitis, biliary colic, choledocholithiasis, perforated viscus, tumor, splenic etiology, constipation, AAA, testicular torsion, renal colic, pyelonephritis, UTI, rhabdomyolysis, epididymitis  Will check labs and CT and US         ED Course         Critical Care Time  Procedures

## 2023-01-20 NOTE — TELEPHONE ENCOUNTER
Recommend patient follow up after completion of antibiotics in the next 10-14 days  Results of final culture pending

## 2023-01-21 LAB — BACTERIA UR CULT: ABNORMAL

## 2023-01-26 NOTE — PROGRESS NOTES
Office Visit- Urology  Cristina Raines 1950 MRN: [id number]      Assessment/Discussion/Plan    67 y o  male managed by     1  Recent UTI and orchialgia  -Presented to the emergency room on 1/19 evaluation of right-sided abdominal pain with right-sided orchialgia associated with episode of hematuria and dysuria  -An ultrasound of the scrotum and testes revealed right-sided epididymitis, bilateral mild complex hydroceles, and testicular microlithiasis  -Urine culture from 1/19 revealed > 100K E  Coli  -Was given a dose of ceftriaxone in the emergency room and sent home with a 10-day course of Levaquin which he has completed  -UA today is negative    -Patient's symptoms have resolved/mproved while on course of antibiotics  Patient no longer has pain  He states the swelling has significantly reduced   -We will order an ultrasound of the scrotum and testes to reevaluate hydroceles before follow up in one month to ensure improvement/resolution  I suspect that these hydroceles were reactive in nature to the epididymitis/UTI        2   Episode of gross hematuria  -On 1/19/2023  In context of urinary tract infection and epididymitis  -A CT renal protocol was also obtained which identified no CT abnormality to account for gross hematuria but did demonstrate prostamegaly  -Tobacco/Smoking history: NO  -Occupational history: NO  -I think it was very likely that patient's episode of gross hematuria was from recent UTI/epididymitis  However in the context of prostamegaly and consideration of surgical management, a cystoscopy would be indicated to both ensure no bladder lesion as well as to assess surgical options for prostamegaly  -will plan to have patient follow-up with an MD for a cystoscopy for the listed reasons above    3    Prostamegaly  -Recent CT with contrast obtained on 1/19 revealed massively enlarged prostate gland measuring approximately 8 6 x 7 6 x 8 1 cm in AP, transverse and longitudinal dimensions   -Using measurements from above prostate volume measured to be at 277 mL  -It is likely that patient's recent UTI/epididymitis could be related to prostamegaly due to mechanism of reflux of urine into ejaculatory ducts secondary to bladder outlet obstruction  -PVR: 119 ml   -Most recent PSA was obtained on 1/6/2023 and resulted at 3 0   -Given the size of patient's prostate -likely surgical options would either be simple prostatectomy versus HoLEP  -Given that these are the 2 most likely options for surgical intervention, I would recommend patient follow-up with Dr Jody Villasenor as he can discuss/provide either of these  surgical interventions if he deems them necessary    4  Prostate Cancer Screening/Elevated PSA  -Patient considered high risk due his ethnicity  No known family history  -S/p negative biopsy in 2018  -See PSA trend below  Note that patient is on finasteride which was initiated in 2019  -PSA was most recently measured to be at 3 0 which corrected for finasteride to be considered 6 0  -YULIA today demonstrated an enlarged prostate with no concerning signs for cancer   -I believe Patient's elevate PSA (when corrected for for finasteride use) is likely due to his prostatamegaly  However, due to potential for consideration of  surgical intervention of his prostate will order an MRI of the prostate to determine if there is a need for another prostate biopsy prior to any potential surgical intervention for prostamegaly  Chief Complaint:   Lai Parra is a 67 y o  male presenting to the office for a new evaluation of recently diagnosed epididymitis  Penny Ortiz is a 29-year-old male with a past urologic history significant for an elevated PSA with his PSA measuring at 5 1 and 5 2 in 2019, negative prostate biopsy in 2018, known history of very large prostate  He was last seen in the office in January 2022    At last visit his PSA was noted to be 2 7 while his prostate on digital rectal examination was noted to be very enlarged  He was initiated on finasteride in 2019  Per prior documentation in the notes his PSA has ranged from range is 2 6-9 03  See PSA trend below  Since initiation of finasteride, PSA has been below 4 0 and remained relatively stable  Patient was previously on an alpha 1 blocker but on documentation stated was not able to tolerate it well  Any questions today about prior therapies for BPH he states that he was prescribed Flomax but he never took it  At baseline patient has frequency, urgency, nocturia x1-2 that is worse only when patient increases his water intake  He does endorse some straining to urinate, hesitancy, intermittency at baseline  On 1/19/2023, the patient presented to the emergency room for evaluation of right testicular pain and episode of gross hematuria  A urine culture demonstrated> 100,000 CFU/milliliter of E  Coli  An ultrasound of the scrotum and testes was obtained and demonstrated hyperemia in the right epididymis with mildly complex bilateral hydroceles  A CT renal protocol was also obtained during the emergency room which demonstrated 2 simple cyst in the right kidney  Prostamegaly was also noted with radiology measuring the dimensions of his prostate as 8 6 x 7 6 x 8 1 cm in AP, transverse and longitudinal dimensions  No complex cyst, renal nodules/masses, nephrolithiasis or other reasons for gross hematuria noted  Patient was diagnosed with right-sided epididymitis and given a dose of ceftriaxone  He was sent home with a 10-day course of Levaquin and was told to follow-up with urology  Today at his appointment, he notes that his orchalgia has resolved  He also notes that his swelling has significantly reduced  ROS:   Review of Systems   Constitutional: Negative  Negative for chills, fatigue and fever  HENT: Negative  Respiratory: Negative  Negative for shortness of breath  Cardiovascular: Negative for chest pain  Gastrointestinal: Negative  Endocrine: Negative  Genitourinary: Positive for difficulty urinating, frequency and urgency  Negative for decreased urine volume, dysuria, flank pain and hematuria  Musculoskeletal: Negative  Allergic/Immunologic: Negative  Neurological: Negative  Psychiatric/Behavioral: Negative            Past Medical History  Past Medical History:   Diagnosis Date   • Anemia     B12 def   • Bunion of right foot 09/17/2020   • Colon polyp    • Constipation    • COVID-19 06/2022   • Glaucoma     suspect   • Mixed hyperlipidemia 01/09/2019   • Pre-syncope 05/31/2022   • Spasm of abdominal muscles of left side 01/29/2020       Past Surgical History  Past Surgical History:   Procedure Laterality Date   • ABDOMINAL SURGERY      "intestine surgery"    • BUNIONECTOMY     • COLONOSCOPY     • HERNIA REPAIR  2010   • HERNIA REPAIR  2016   • HERNIA REPAIR     • [place] [facility] [address] W/SESMDC W/DIST Arthea Milad OSTEOT Right 10/12/2020    Procedure: BUNIONECTOMY, DOUBLE OSTEOTOMY;  Surgeon: Terrell Salazar DPM;  Location: [address] [place] [address] MAIN OR;  Service: Podiatry   • [place] EXCISION TUMOR SOFT TIS BACK/FLANK SUBQ 3 CM/> N/A 11/12/2021    Procedure: EXCISION  BIOPSY LESION/MASS BACK;  Surgeon: Phillip Plata MD;  Location: [facility] MAIN OR;  Service: General   • [place] EXCISION TUMOR SOFT TISSUE SHOULDER SUBQ 3 CM/> Right 11/12/2021    Procedure: EXCISION BIOPSY TISSUE LESION/MASS UPPER EXTREMITY;  Surgeon: Phillip Plata MD;  Location: [facility] MAIN OR;  Service: General       Past Family History  Family History   Problem Relation Age of Onset   • Hypertension Mother    • Uterine cancer Sister        Past Social history  Social History     Socioeconomic History   • Marital status: /Civil Union     Spouse name: Not on file   • Number of children: Not on file   • Years of education: Not on file   • Highest education level: Not on file   Occupational History   • Not on file   Tobacco Use   • Smoking status: Never   • Smokeless tobacco: Never   Vaping Use   • Vaping Use: Never used   Substance and Sexual Activity   • Alcohol use: Not Currently   • Drug use: Never   • Sexual activity: Yes     Partners: Female   Other Topics Concern   • Not on file   Social History Narrative   • Not on file     Social Determinants of Health     Financial Resource Strain: Not on file   Food Insecurity: Not on file   Transportation Needs: Not on file   Physical Activity: Not on file   Stress: Not on file   Social Connections: Not on file   Intimate Partner Violence: Not on file   Housing Stability: Not on file       Current Medications  Current Outpatient Medications   Medication Sig Dispense Refill   • Ascorbic Acid (VITAMIN C PO) Take by mouth     • Cyanocobalamin (VITAMIN B-12) 2500 MCG SUBL Place 1 tablet under the tongue daily     • docusate sodium (COLACE) 100 mg capsule Take 100 mg by mouth as needed      • dorzolamide-timolol (COSOPT) 22 3-6 8 MG/ML ophthalmic solution      • finasteride (PROSCAR) 5 mg tablet TAKE 1 TABLET BY MOUTH  DAILY 90 tablet 3   • latanoprost (XALATAN) 0 005 % ophthalmic solution Administer 1 drop to both eyes daily at bedtime      • Multiple Vitamin (MULTIVITAMIN) tablet Take 1 tablet by mouth daily     • Multiple Vitamins-Minerals (PRESERVISION AREDS PO) Take 1 capsule by mouth 2 (two) times a day     • VITAMIN D PO Take by mouth       No current facility-administered medications for this visit  Allergies  No Known Allergies    OBJECTIVE    Vitals   Vitals:    01/30/23 0755   BP: 132/82   BP Location: Right arm   Patient Position: Sitting   Cuff Size: Standard   Pulse: 63   Resp: 18   SpO2: 98%   Weight: 68 9 kg (152 lb)   Height: 6' 11" (2 108 m)       PVR:119 ml     Physical Exam  Constitutional:       Appearance: Normal appearance  He is normal weight  HENT:      Head: Normocephalic and atraumatic  Cardiovascular:      Rate and Rhythm: Normal rate  Pulmonary:      Effort: No respiratory distress  Abdominal:      Tenderness: There is no right CVA tenderness or left CVA tenderness  Genitourinary:     Comments: No erythema or overt swelling noted on scrotal exam  Right-sided hydrocele noted on exam making it difficult to appreciate epididymis  Minimal hydrocele noted on left side  YULIA today revealed a very large prostate without concern for prostate cancer including nodules, induration, asymmetry  Nontender  Musculoskeletal:         General: Normal range of motion  Cervical back: Normal range of motion and neck supple  Skin:     General: Skin is warm and dry  Neurological:      General: No focal deficit present  Mental Status: He is alert  Cranial Nerves: No cranial nerve deficit  Psychiatric:         Mood and Affect: Mood normal          Behavior: Behavior normal          Thought Content: Thought content normal           Labs:     Component 1/30/23  7:59 AM    LEUKOCYTE ESTERASE,UA -    NITRITE,UA -    SL AMB POCT UROBILINOGEN 0 2    POCT URINE PROTEIN -     PH,UA 5 0    BLOOD,UA -    SPECIFIC GRAVITY,UA 1 010    KETONES,UA -    BILIRUBIN,UA -    GLUCOSE, UA -     COLOR,UA yellow    CLARITY,UA clear          Lab Results   Component Value Date    PSA 3 0 01/06/2023    PSA 2 7 12/31/2021    PSA 3 0 12/15/2020    PSA 2 9 10/02/2020    PSA 3 3 04/08/2020    PSA 5 2 (H) 10/07/2019    PSA 5 1 (H) 03/18/2019       Lab Results   Component Value Date    CREATININE 0 94 01/19/2023      Lab Results   Component Value Date    HGBA1C 5 6 06/25/2021     Lab Results   Component Value Date    CALCIUM 9 2 01/19/2023    K 3 6 01/19/2023    CO2 24 01/19/2023     01/19/2023    BUN 17 01/19/2023    CREATININE 0 94 01/19/2023       I have personally reviewed all pertinent lab results and reviewed with patient    Imaging     CT Renal Protocol 1/19    RIGHT KIDNEY AND URETER:  No solid renal mass    Exophytic simple cyst at the upper pole measuring 3 5 x 2 4 cm, and 1 4 x 0 6 cm simple cyst in the posterior lip of the interpolar kidney  No detectable urothelial mass  No hydronephrosis or hydroureter  No urinary tract calculi  No perinephric collection      LEFT KIDNEY AND URETER:  No solid renal mass  No detectable urothelial mass  No hydronephrosis or hydroureter  No urinary tract calculi  No perinephric collection      URINARY BLADDER:  No bladder wall mass  No calculi  Markedly enlarged prostate gland compresses the bladder      LOWER CHEST:  No clinically significant abnormality identified in the visualized lower chest      LIVER/BILIARY TREE:  Unremarkable      GALLBLADDER:  No calcified gallstones  No pericholecystic inflammatory change      SPLEEN:  Unremarkable      PANCREAS:  Unremarkable      ADRENAL GLANDS:  Unremarkable      STOMACH AND BOWEL:  Congenital intestinal malrotation  No evidence of obstruction or acute inflammatory changes      APPENDIX:  No findings to suggest appendicitis      ABDOMINOPELVIC CAVITY:  No ascites  No free intraperitoneal air  No lymphadenopathy      VESSELS:  Unremarkable for patient's age      PELVIS     REPRODUCTIVE ORGANS:  Massively enlarged prostate gland measuring approximately 8 6 x 7 6 x 8 1 cm in AP, transverse and longitudinal dimensions      ABDOMINAL WALL/INGUINAL REGIONS:  Status post ventral and bilateral inguinal hernia repairs      OSSEOUS STRUCTURES:  No acute fracture or destructive osseous lesion  Stable small sclerotic foci in the left iliac bone adjacent to the SI joint      IMPRESSION:     No CT abnormality identified to account for hematuria        Severe prostatomegaly  US Scrotum/Testes 1/19    TESTES:   Testes are symmetric and normal in size      RIGHT testis = 3 4 x 2 3 x 2 6 cm  Volume 10 6 mL  Normal contour with homogeneous smooth echotexture  Microlithiasis is seen  Multiple cystic lesions within the right testicle measuring up to 2 mm        LEFT testis = 3 4 x 2 0 x 1 9 cm   Volume 7 0 mL  Normal contour with homogeneous smooth echotexture  Microlithiasis is seen  Multiple cystic lesions within the right testicle measuring up to 2 mm        Doppler flow within both testes is present and symmetric      EPIDIDYMIDES:   Normal Size  Doppler ultrasound demonstrates hyperemia in the right epididymis  There are small epididymal cyst(s) bilaterally  Otherwise unremarkable      HYDROCELE:  Bilateral mildly complex hydroceles      VARICOCELE:  None present      SCROTUM:  Scrotal thickness and appearance within normal limits  No evidence for extratesticular mass or hernia demonstrated      IMPRESSION:     Hyperemia in the right epididymis suspicious for epididymitis  Follow-up with urology is recommended      Bilateral complex hydroceles  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 or other worrisome findings  In the absence of any other risk factors for testicular cancer (e g , personal history of testicular cancer, father or brother with testicular cancer, history   of cryptorchidism for maldescent, testicular atrophy, or other risk factors), no further imaging or biochemical follow-up is necessary; all that is recommended is routine monthly testicular self-examination  However if the patient has risk factors for   testicular cancer, evaluation and determination of an optimal follow-up strategy is deferred to urologist  (Reference: AJR 2016: [id number] )    Hannah Valencia PA-C  Date: 1/30/2023 Time: 10:37 AM  [facility]    This note was written using fluency dictation software  Please excuse any resulting minor grammatical errors

## 2023-01-30 ENCOUNTER — OFFICE VISIT (OUTPATIENT)
Dept: UROLOGY | Facility: AMBULATORY SURGERY CENTER | Age: 73
End: 2023-01-30

## 2023-01-30 VITALS
WEIGHT: 152 LBS | RESPIRATION RATE: 18 BRPM | HEART RATE: 63 BPM | OXYGEN SATURATION: 98 % | HEIGHT: 78 IN | SYSTOLIC BLOOD PRESSURE: 132 MMHG | BODY MASS INDEX: 17.59 KG/M2 | DIASTOLIC BLOOD PRESSURE: 82 MMHG

## 2023-01-30 DIAGNOSIS — N13.8 ENLARGED PROSTATE WITH URINARY OBSTRUCTION: Primary | ICD-10-CM

## 2023-01-30 DIAGNOSIS — N40.1 ENLARGED PROSTATE WITH URINARY OBSTRUCTION: Primary | ICD-10-CM

## 2023-01-30 DIAGNOSIS — N43.3 HYDROCELE, UNSPECIFIED HYDROCELE TYPE: ICD-10-CM

## 2023-01-30 DIAGNOSIS — R97.20 ELEVATED PSA: ICD-10-CM

## 2023-01-30 LAB
POST-VOID RESIDUAL VOLUME, ML POC: 119 ML
SL AMB  POCT GLUCOSE, UA: NORMAL
SL AMB LEUKOCYTE ESTERASE,UA: NORMAL
SL AMB POCT BILIRUBIN,UA: NORMAL
SL AMB POCT BLOOD,UA: NORMAL
SL AMB POCT CLARITY,UA: CLEAR
SL AMB POCT COLOR,UA: YELLOW
SL AMB POCT KETONES,UA: NORMAL
SL AMB POCT NITRITE,UA: NORMAL
SL AMB POCT PH,UA: 5
SL AMB POCT SPECIFIC GRAVITY,UA: 1.01
SL AMB POCT URINE PROTEIN: NORMAL
SL AMB POCT UROBILINOGEN: 0.2

## 2023-02-04 ENCOUNTER — HOSPITAL ENCOUNTER (OUTPATIENT)
Dept: ULTRASOUND IMAGING | Facility: HOSPITAL | Age: 73
Discharge: HOME | End: 2023-02-04

## 2023-02-04 DIAGNOSIS — N43.3 HYDROCELE, UNSPECIFIED HYDROCELE TYPE: ICD-10-CM

## 2023-02-08 ENCOUNTER — OFFICE VISIT (OUTPATIENT)
Dept: INTERNAL MEDICINE CLINIC | Facility: OTHER | Age: 73
End: 2023-02-08

## 2023-02-08 VITALS
BODY MASS INDEX: 17.59 KG/M2 | SYSTOLIC BLOOD PRESSURE: 130 MMHG | HEART RATE: 62 BPM | DIASTOLIC BLOOD PRESSURE: 74 MMHG | RESPIRATION RATE: 18 BRPM | HEIGHT: 78 IN | OXYGEN SATURATION: 100 % | TEMPERATURE: 98.4 F | WEIGHT: 152 LBS

## 2023-02-08 DIAGNOSIS — Z13.6 SCREENING FOR CARDIOVASCULAR CONDITION: ICD-10-CM

## 2023-02-08 DIAGNOSIS — E44.1 MILD PROTEIN-CALORIE MALNUTRITION (HCC): ICD-10-CM

## 2023-02-08 DIAGNOSIS — R33.8 BENIGN PROSTATIC HYPERPLASIA WITH URINARY RETENTION: ICD-10-CM

## 2023-02-08 DIAGNOSIS — E78.2 MODERATE MIXED HYPERLIPIDEMIA NOT REQUIRING STATIN THERAPY: Primary | ICD-10-CM

## 2023-02-08 DIAGNOSIS — Z71.89 ADVANCED CARE PLANNING/COUNSELING DISCUSSION: ICD-10-CM

## 2023-02-08 DIAGNOSIS — Z12.5 SCREENING FOR PROSTATE CANCER: ICD-10-CM

## 2023-02-08 DIAGNOSIS — N40.1 BENIGN PROSTATIC HYPERPLASIA WITH URINARY RETENTION: ICD-10-CM

## 2023-02-08 DIAGNOSIS — Z13.1 SCREENING FOR DIABETES MELLITUS: ICD-10-CM

## 2023-02-08 DIAGNOSIS — Z12.12 SCREENING FOR COLORECTAL CANCER: ICD-10-CM

## 2023-02-08 DIAGNOSIS — E55.9 VITAMIN D DEFICIENCY: ICD-10-CM

## 2023-02-08 DIAGNOSIS — D72.829 LEUKOCYTOSIS, UNSPECIFIED TYPE: ICD-10-CM

## 2023-02-08 DIAGNOSIS — K59.09 OTHER CONSTIPATION: ICD-10-CM

## 2023-02-08 DIAGNOSIS — Z00.00 MEDICARE ANNUAL WELLNESS VISIT, SUBSEQUENT: ICD-10-CM

## 2023-02-08 DIAGNOSIS — Z12.11 SCREENING FOR COLORECTAL CANCER: ICD-10-CM

## 2023-02-08 DIAGNOSIS — F32.A DEPRESSION, UNSPECIFIED DEPRESSION TYPE: ICD-10-CM

## 2023-02-08 PROBLEM — R97.20 ABNORMAL PROSTATE SPECIFIC ANTIGEN (PSA): Status: RESOLVED | Noted: 2018-09-25 | Resolved: 2023-02-08

## 2023-02-08 PROBLEM — S46.912A LEFT SHOULDER STRAIN, INITIAL ENCOUNTER: Status: RESOLVED | Noted: 2021-11-09 | Resolved: 2023-02-08

## 2023-02-08 NOTE — PROGRESS NOTES
Assessment and Plan:     Problem List Items Addressed This Visit        Genitourinary    Benign prostatic hyperplasia with urinary retention     He has follow-up scheduled with urology  He is scheduled for MRI of the prostate  Ultrasound of the prostate pending  Other    Moderate mixed hyperlipidemia not requiring statin therapy - Primary     Discussed diet and exercise  Continue to trend lipid panel  Relevant Orders    Comprehensive metabolic panel    Lipid panel    CBC    Vitamin D deficiency     Continue vitamin supplementation  Other constipation     Stable, controlled  Continue current bowel regimen  Mild protein-calorie malnutrition ([facility] [address] )    Advanced care planning/counseling discussion     Please refer to ACP note  Full code  RESOLVED: Depression   Other Visit Diagnoses     Medicare annual wellness visit, subsequent        Leukocytosis, unspecified type        Relevant Orders    Comprehensive metabolic panel    CBC    Screening for diabetes mellitus        Screening for cardiovascular condition        Screening for colorectal cancer        Screening for prostate cancer            Serious Illness Conversation    1  What is your understanding now of where you are with your illness? Prognostic Understanding: appropriate understanding of prognosis     2  How much information about what is likely to be ahead with your illness would you like to have? Information: patient wants to be fully informed     3  What did you (clinician) communicate to the patient? Prognostic Communication: Uncertain - It can be difficult to predict what will happen with your illness  I hope you will continue to live well for a long time but I’m worried that you could get sick quickly, and I think it is important to prepare for that possibility  4  If your health situation worsens, what are your most important goals?   Goals: be mentally aware, have my medical decisions respected, live as long as possible, no matter what     5  What are the biggest fears and worries about the future and your health? Fears/Worries: burdening others, being an emotional burden     6  What abilities are so critical to your life that you cannot imagine living without them? Unacceptable Function: being unconscious     7  What gives you strength as you think about the future with your illness? 8  If you become sicker, how much are you willing to go through for the possibility of gaining more time? Be in the hospital: Yes Have a feeding tube: Yes   Be in the ICU: Yes Live in a nursing home: Yes   Be on a ventilator: Yes Be uncomfortable: Yes   Be on dialysis: Yes Undergo aggressive test and/or procedures: Yes   9  How much does your proxy and family know about your priorities and wishes? Discussion Discussion: extensive discussion with family about goals and wishes        How does this plan sound to you? I will do everything I can to help you through this  Patient verbalized understanding of the plan     I have spent 15 minutes speaking with my patient on advanced care planning today or during this visit     Advanced directives  Five Wishes: Patient does not have Five Wishes- would like information         BMI Counseling: Body mass index is 15 51 kg/m²  The BMI is below normal  Patient advised to gain weight  Patient referred to PCP  Rationale for BMI follow-up plan is due to patient being underweight  Preventive health issues were discussed with patient, and age appropriate screening tests were ordered as noted in patient's After Visit Summary  Personalized health advice and appropriate referrals for health education or preventive services given if needed, as noted in patient's After Visit Summary  History of Present Illness:     Patient presents for a Medicare Wellness Visit    67year old male is seen today for follow up of chronic conditions     Recent labs reviewed today, CBC showed mild leukocytosis  CMP is within acceptable range  Recent Emergency Department for hematuria and UTI  He completed antibiotic therapy with Levaquin  He has establish care with a urologist and had an ultrasound of the prostate, results pending  He is scheduled for MRI of the prostate and has follow-up scheduled with urology  PSA is within acceptable range  He has been compliant with finasteride  He denies any urinary symptoms at this time  Otherwise, he has no active complaints or concerns at this time  He recently had a colonoscopy for colorectal cancer screening to which was without abnormality  Constipation  This is a chronic problem  The current episode started more than 1 year ago  The problem has been gradually improving since onset  The stool is described as firm  He has adequate water intake  Pertinent negatives include no abdominal pain, diarrhea, difficulty urinating, fever, nausea or vomiting  He has been eating and drinking normally  Patient Care Team:  Shraddha Zamora MD as PCP - General (Internal Medicine)  Shraddha Zamora MD as PCP - [address] [place] [address] (RTE)     Review of Systems:     Review of Systems   Constitutional: Negative for activity change, appetite change, chills, diaphoresis, fatigue and fever  HENT: Negative for congestion, postnasal drip, rhinorrhea, sinus pressure, sinus pain, sneezing and sore throat  Eyes: Negative for visual disturbance  Respiratory: Negative for apnea, cough, choking, chest tightness, shortness of breath and wheezing  Cardiovascular: Negative for chest pain, palpitations and leg swelling  Gastrointestinal: Negative for abdominal distention, abdominal pain, anal bleeding, blood in stool, constipation, diarrhea, nausea and vomiting  Endocrine: Negative for cold intolerance and heat intolerance  Genitourinary: Negative for difficulty urinating, dysuria and hematuria  Musculoskeletal: Negative  Skin: Negative      Neurological: Negative for dizziness, weakness, light-headedness, numbness and headaches  Hematological: Negative for adenopathy  Psychiatric/Behavioral: Negative for agitation, sleep disturbance and suicidal ideas  All other systems reviewed and are negative         Problem List:     Patient Active Problem List   Diagnosis   • Benign prostatic hyperplasia with urinary retention   • Moderate mixed hyperlipidemia not requiring statin therapy   • Inguinal hernia   • History of intestinal malabsorption   • Encounter for colorectal cancer screening   • Vitamin D deficiency   • Low mean corpuscular volume (MCV)   • Family history of thalassemia   • Other constipation   • Lipoma of right upper extremity   • Mild protein-calorie malnutrition ([address] )   • Advanced care planning/counseling discussion      Past Medical and Surgical History:     Past Medical History:   Diagnosis Date   • Anemia     B12 def   • Bunion of right foot 09/17/2020   • Colon polyp    • Constipation    • COVID-19 06/2022   • Depression 5/31/2022   • Glaucoma     suspect   • Mixed hyperlipidemia 01/09/2019   • Pre-syncope 05/31/2022   • Spasm of abdominal muscles of left side 01/29/2020     Past Surgical History:   Procedure Laterality Date   • ABDOMINAL SURGERY      "intestine surgery"    • BUNIONECTOMY     • COLONOSCOPY     • HERNIA REPAIR  2010   • HERNIA REPAIR  2016   • HERNIA REPAIR     • AK CORRJ [address] W/SESMDC W/DIST METAR OSTEOT Right 10/12/2020    Procedure: BUNIONECTOMY, DOUBLE OSTEOTOMY;  Surgeon: Coby Melendez DPM;  Location: [address] [place] [address] MAIN OR;  Service: Podiatry   • AK EXCISION TUMOR SOFT TIS BACK/FLANK SUBQ 3 CM/> N/A 11/12/2021    Procedure: EXCISION  BIOPSY LESION/MASS BACK;  Surgeon: Fredi Clark MD;  Location: [facility] MAIN OR;  Service: General   • AK EXCISION TUMOR SOFT TISSUE SHOULDER SUBQ 3 CM/> Right 11/12/2021    Procedure: EXCISION BIOPSY TISSUE LESION/MASS UPPER EXTREMITY;  Surgeon: Fredi Clark MD;  Location: [facility] MAIN [facility];  Service: General Family History:     Family History   Problem Relation Age of Onset   • Hypertension Mother    • Uterine cancer Sister       Social History:     Social History     Socioeconomic History   • Marital status: /Civil Union     Spouse name: None   • Number of children: None   • Years of education: None   • Highest education level: None   Occupational History   • None   Tobacco Use   • Smoking status: Never   • Smokeless tobacco: Never   Vaping Use   • Vaping Use: Never used   Substance and Sexual Activity   • Alcohol use: Not Currently   • Drug use: Never   • Sexual activity: Yes     Partners: Female   Other Topics Concern   • None   Social History Narrative   • None     Social Determinants of Health     Financial Resource Strain: Low Risk    • Difficulty of Paying Living Expenses: Not very hard   Food Insecurity: Not on file   Transportation Needs: No Transportation Needs   • Lack of Transportation (Medical): No   • Lack of Transportation (Non-Medical):  No   Physical Activity: Not on file   Stress: Not on file   Social Connections: Not on file   Intimate Partner Violence: Not on file   Housing Stability: Not on file      Medications and Allergies:     Current Outpatient Medications   Medication Sig Dispense Refill   • Ascorbic Acid (VITAMIN C PO) Take by mouth     • Cyanocobalamin (VITAMIN B-12) 2500 MCG SUBL Place 1 tablet under the tongue daily     • docusate sodium (COLACE) 100 mg capsule Take 100 mg by mouth as needed      • dorzolamide-timolol (COSOPT) 22 3-6 8 MG/ML ophthalmic solution      • finasteride (PROSCAR) 5 mg tablet TAKE 1 TABLET BY MOUTH  DAILY 90 tablet 3   • latanoprost (XALATAN) 0 005 % ophthalmic solution Administer 1 drop to both eyes daily at bedtime      • Multiple Vitamin (MULTIVITAMIN) tablet Take 1 tablet by mouth as needed     • Multiple Vitamins-Minerals (PRESERVISION AREDS PO) Take 1 capsule by mouth 2 (two) times a day     • VITAMIN D PO Take by mouth as needed       No current facility-administered medications for this visit  No Known Allergies   Immunizations:     Immunization History   Administered Date(s) Administered   • COVID-19 PFIZER VACCINE 0 3 ML IM 03/26/2021, 04/19/2021   • COVID-19 Pfizer Vac BIVALENT Mehrdad-sucrose 12 Yr+ IM (BOOSTER ONLY) 11/03/2022   • INFLUENZA 11/01/2015, 11/29/2017, 10/03/2018, 10/13/2021, 10/06/2022   • Influenza Injectable, MDCK, Preservative Free, Quadrivalent, 0 5 mL 11/29/2017   • Influenza Split High Dose Preservative Free IM 09/19/2019   • Influenza, high dose seasonal 0 7 mL 11/09/2020   • Pneumococcal Conjugate 13-Valent 01/09/2019   • Pneumococcal Polysaccharide PPV23 12/23/2020   • Zoster Vaccine Recombinant 08/12/2019, 02/12/2020      Health Maintenance:         Topic Date Due   • Hepatitis C Screening  Completed   • Colorectal Cancer Screening  Discontinued         Topic Date Due   • COVID-19 Vaccine (3 - Booster for Pfizer series) 12/29/2022      Medicare Screening Tests and Risk Assessments:     Dk Bustillo is here for his Subsequent Wellness visit  Last Medicare Wellness visit information reviewed, patient interviewed and updates made to the record today  Health Risk Assessment:   Patient rates overall health as good  Patient feels that their physical health rating is much better  Patient is very satisfied with their life  Eyesight was rated as same  Hearing was rated as same  Patient feels that their emotional and mental health rating is same  Patients states they are sometimes angry  Patient states they are sometimes unusually tired/fatigued  Pain experienced in the last 7 days has been none  Patient states that he has experienced no weight loss or gain in last 6 months  Depression Screening:   PHQ-9 Score: 0      Fall Risk Screening: In the past year, patient has experienced: no history of falling in past year      Home Safety:  Patient does not have trouble with stairs inside or outside of their home   Patient has working smoke alarms and has working carbon monoxide detector  Home safety hazards include: none  Nutrition:   Current diet is Regular  Medications:   Patient is currently taking over-the-counter supplements  OTC medications include: see medication list  Patient is able to manage medications  Activities of Daily Living (ADLs)/Instrumental Activities of Daily Living (IADLs):   Walk and transfer into and out of bed and chair?: Yes  Dress and groom yourself?: Yes    Bathe or shower yourself?: Yes    Feed yourself? Yes  Do your laundry/housekeeping?: Yes  Manage your money, pay your bills and track your expenses?: Yes  Make your own meals?: Yes    Do your own shopping?: Yes    Previous Hospitalizations:   Any hospitalizations or ED visits within the last 12 months?: Yes    How many hospitalizations have you had in the last year?: 1-2    Advance Care Planning:   Living will: Yes    Advanced directive: Yes    Advanced directive counseling given: Yes    End of Life Decisions reviewed with patient: Yes    Provider agrees with end of life decisions: Yes      Comments: Refer to ACP note         Cognitive Screening:   Provider or family/friend/caregiver concerned regarding cognition?: No    PREVENTIVE SCREENINGS      Cardiovascular Screening:    General: Screening Not Indicated, History Lipid Disorder, Risks and Benefits Discussed and Screening Current    Due for: Lipid Panel      Diabetes Screening:     General: Screening Current and Risks and Benefits Discussed    Due for: Blood Glucose      Colorectal Cancer Screening:     General: Screening Current and Risks and Benefits Discussed      Prostate Cancer Screening:    General: Screening Current and Risks and Benefits Discussed      Osteoporosis Screening:    General: Screening Not Indicated      Abdominal Aortic Aneurysm (AAA) Screening:    Risk factors include: age between 73-67 yo        General: Screening Not Indicated      Lung Cancer Screening:     General: Screening Not Indicated      Hepatitis C Screening:    General: Screening Current    Screening, Brief Intervention, and Referral to Treatment (SBIRT)    Screening  Typical number of drinks in a day: 0  Typical number of drinks in a week: 0  Interpretation: Low risk drinking behavior  Single Item Drug Screening:  How often have you used an illegal drug (including marijuana) or a prescription medication for non-medical reasons in the past year? never    Single Item Drug Screen Score: 0  Interpretation: Negative screen for possible drug use disorder    Brief Intervention  Alcohol & drug use screenings were reviewed  No concerns regarding substance use disorder identified  Other Counseling Topics:   Car/seat belt/driving safety, skin self-exam, sunscreen and calcium and vitamin D intake and regular weightbearing exercise  No results found  Physical Exam:     /74 (BP Location: Left arm, Patient Position: Sitting, Cuff Size: Standard)   Pulse 62   Temp 98 4 °F (36 9 °C) (Temporal)   Resp 18   Ht 6' 11" (2 108 m)   Wt 68 9 kg (152 lb)   SpO2 100%   BMI 15 51 kg/m²     Physical Exam  Vitals and nursing note reviewed  Constitutional:       General: He is not in acute distress  Appearance: Normal appearance  He is normal weight  He is not ill-appearing, toxic-appearing or diaphoretic  HENT:      Head: Normocephalic and atraumatic  Right Ear: Tympanic membrane, ear canal and external ear normal  There is no impacted cerumen  Left Ear: Tympanic membrane, ear canal and external ear normal  There is no impacted cerumen  Nose: Nose normal  No congestion or rhinorrhea  Mouth/Throat:      Mouth: Mucous membranes are moist       Pharynx: Oropharynx is clear  No oropharyngeal exudate or posterior oropharyngeal erythema  Eyes:      General: No scleral icterus  Right eye: No discharge  Left eye: No discharge  Extraocular Movements: Extraocular movements intact  Conjunctiva/sclera: Conjunctivae normal       Pupils: Pupils are equal, round, and reactive to light  Neck:      Vascular: No carotid bruit  Cardiovascular:      Rate and Rhythm: Normal rate and regular rhythm  Pulses: Normal pulses  Heart sounds: Normal heart sounds  No murmur heard  No friction rub  No gallop  Pulmonary:      Effort: Pulmonary effort is normal  No respiratory distress  Breath sounds: Normal breath sounds  No wheezing or rales  Abdominal:      General: Abdomen is flat  Bowel sounds are normal  There is no distension  Palpations: Abdomen is soft  There is no mass  Tenderness: There is no abdominal tenderness  There is no guarding  Hernia: No hernia is present  Musculoskeletal:         General: No swelling, tenderness, deformity or signs of injury  Normal range of motion  Cervical back: Normal range of motion and neck supple  No rigidity  No muscular tenderness  Right lower leg: No edema  Left lower leg: No edema  Lymphadenopathy:      Cervical: No cervical adenopathy  Skin:     General: Skin is warm and dry  Capillary Refill: Capillary refill takes less than 2 seconds  Coloration: Skin is not jaundiced or pale  Findings: No bruising, erythema, lesion or rash  Neurological:      General: No focal deficit present  Mental Status: He is alert and oriented to person, place, and time  Mental status is at baseline  Cranial Nerves: No cranial nerve deficit  Sensory: No sensory deficit  Motor: No weakness  Coordination: Coordination normal       Gait: Gait normal       Deep Tendon Reflexes: Reflexes normal    Psychiatric:         Mood and Affect: Mood normal          Behavior: Behavior normal          Thought Content:  Thought content normal          Judgment: Judgment normal           Rebecca Prajapati MD

## 2023-02-08 NOTE — ASSESSMENT & PLAN NOTE
He has follow-up scheduled with urology  He is scheduled for MRI of the prostate  Ultrasound of the prostate pending

## 2023-02-08 NOTE — PATIENT INSTRUCTIONS
Medicare Preventive Visit Patient Instructions  Thank you for completing your Welcome to Medicare Visit or Medicare Annual Wellness Visit today  Your next wellness visit will be due in one year (2/9/2024)  The screening/preventive services that you may require over the next 5-10 years are detailed below  Some tests may not apply to you based off risk factors and/or age  Screening tests ordered at today's visit but not completed yet may show as past due  Also, please note that scanned in results may not display below  Preventive Screenings:  Service Recommendations Previous Testing/Comments   Colorectal Cancer Screening  · Colonoscopy    · Fecal Occult Blood Test (FOBT)/Fecal Immunochemical Test (FIT)  · Fecal DNA/Cologuard Test  · Flexible Sigmoidoscopy Age: 39-70 years old   Colonoscopy: every 10 years (May be performed more frequently if at higher risk)  OR  FOBT/FIT: every 1 year  OR  Cologuard: every 3 years  OR  Sigmoidoscopy: every 5 years  Screening may be recommended earlier than age 39 if at higher risk for colorectal cancer  Also, an individualized decision between you and your healthcare provider will decide whether screening between the ages of 74-80 would be appropriate   Colonoscopy: 01/09/2023  FOBT/FIT: Not on file  Cologuard: Not on file  Sigmoidoscopy: Not on file    Screening Current     Prostate Cancer Screening Individualized decision between patient and health care provider in men between ages of 53-78   Medicare will cover every 12 months beginning on the day after your 50th birthday PSA: 3 0 ng/mL     Screening Current     Hepatitis C Screening Once for adults born between 1945 and 1965  More frequently in patients at high risk for Hepatitis C Hep C Antibody: 03/30/2016    Screening Current   Diabetes Screening 1-2 times per year if you're at risk for diabetes or have pre-diabetes Fasting glucose: 88 mg/dL (12/31/2021)  A1C: 5 6 % (6/25/2021)  Screening Current   Cholesterol Screening Once every 5 years if you don't have a lipid disorder  May order more often based on risk factors  Lipid panel: 07/25/2022  Screening Not Indicated  History Lipid Disorder      Other Preventive Screenings Covered by Medicare:  1  Abdominal Aortic Aneurysm (AAA) Screening: covered once if your at risk  You're considered to be at risk if you have a family history of AAA or a male between the age of 73-68 who smoking at least 100 cigarettes in your lifetime  2  Lung Cancer Screening: covers low dose CT scan once per year if you meet all of the following conditions: (1) Age 50-69; (2) No signs or symptoms of lung cancer; (3) Current smoker or have quit smoking within the last 15 years; (4) You have a tobacco smoking history of at least 20 pack years (packs per day x number of years you smoked); (5) You get a written order from a healthcare provider  3  Glaucoma Screening: covered annually if you're considered high risk: (1) You have diabetes OR (2) Family history of glaucoma OR (3)  aged 48 and older OR (3)  American aged 72 and older  3  Osteoporosis Screening: covered every 2 years if you meet one of the following conditions: (1) Have a vertebral abnormality; (2) On glucocorticoid therapy for more than 3 months; (3) Have primary hyperparathyroidism; (4) On osteoporosis medications and need to assess response to drug therapy  5  HIV Screening: covered annually if you're between the age of 12-76  Also covered annually if you are younger than 13 and older than 72 with risk factors for HIV infection  For pregnant patients, it is covered up to 3 times per pregnancy      Immunizations:  Immunization Recommendations   Influenza Vaccine Annual influenza vaccination during flu season is recommended for all persons aged >= 6 months who do not have contraindications   Pneumococcal Vaccine   * Pneumococcal conjugate vaccine = PCV13 (Prevnar 13), PCV15 (Vaxneuvance), PCV20 (Prevnar 20)  * Pneumococcal polysaccharide vaccine = PPSV23 (Pneumovax) Adults 2364 years old: 1-3 doses may be recommended based on certain risk factors  Adults 72 years old: 1-2 doses may be recommended based off what pneumonia vaccine you previously received   Hepatitis B Vaccine 3 dose series if at intermediate or high risk (ex: diabetes, end stage renal disease, liver disease)   Tetanus (Td) Vaccine - COST NOT COVERED BY MEDICARE PART B Following completion of primary series, a booster dose should be given every 10 years to maintain immunity against tetanus  Td may also be given as tetanus wound prophylaxis  Tdap Vaccine - COST NOT COVERED BY MEDICARE PART B Recommended at least once for all adults  For pregnant patients, recommended with each pregnancy  Shingles Vaccine (Shingrix) - COST NOT COVERED BY MEDICARE PART B  2 shot series recommended in those aged 48 and above     Health Maintenance Due:      Topic Date Due   • Hepatitis C Screening  Completed   • Colorectal Cancer Screening  Discontinued     Immunizations Due:      Topic Date Due   • COVID-19 Vaccine (3 - Booster for Pisano Peter series) 12/29/2022     Advance Directives   What are advance directives? Advance directives are legal documents that state your wishes and plans for medical care  These plans are made ahead of time in case you lose your ability to make decisions for yourself  Advance directives can apply to any medical decision, such as the treatments you want, and if you want to donate organs  What are the types of advance directives? There are many types of advance directives, and each state has rules about how to use them  You may choose a combination of any of the following:  · Living will: This is a written record of the treatment you want  You can also choose which treatments you do not want, which to limit, and which to stop at a certain time  This includes surgery, medicine, IV fluid, and tube feedings     · Durable power of  for healthcare [facility]): This is a written record that states who you want to make healthcare choices for you when you are unable to make them for yourself  This person, called a proxy, is usually a family member or a friend  You may choose more than 1 proxy  · Do not resuscitate (DNR) order:  A DNR order is used in case your heart stops beating or you stop breathing  It is a request not to have certain forms of treatment, such as CPR  A DNR order may be included in other types of advance directives  · Medical directive: This covers the care that you want if you are in a coma, near death, or unable to make decisions for yourself  You can list the treatments you want for each condition  Treatment may include pain medicine, surgery, blood transfusions, dialysis, IV or tube feedings, and a ventilator (breathing machine)  · Values history: This document has questions about your views, beliefs, and how you feel and think about life  This information can help others choose the care that you would choose  Why are advance directives important? An advance directive helps you control your care  Although spoken wishes may be used, it is better to have your wishes written down  Spoken wishes can be misunderstood, or not followed  Treatments may be given even if you do not want them  An advance directive may make it easier for your family to make difficult choices about your care  Underweight  Underweight is defined as having a body mass index (BMI) of less than 18 5 kg/m2   Anorexia  is a loss of appetite, decreased food intake, or both  Your appetite naturally decreases as you get older  You also get full faster than you used to  This occurs because your body needs less energy  Other body changes can also lead to a decreased appetite  Even though some appetite loss is normal, you still need to get enough calories and nutrients to keep you healthy   You can start to lose too much weight if you do not eat as much food as your body needs  Unwanted weight loss can cause health problems, or worsen health problems you already have  You can also become dehydrated if you do not drink enough liquid  How to eat healthy and get enough nutrients:   · Choose healthy foods  Eat a variety of fruits, vegetables, whole grains, low-fat dairy foods, lean meats, and other protein foods  Limit foods high in fat, sugar, and salt  Limit or avoid alcohol as directed  Work with a dietitian to help you plan your meals if you need to follow a special diet  A dietitian can also teach you how to modify foods if you have trouble chewing or swallowing  · Snack on healthy foods between meals  if you only eat a small amount during meals  Snacks provide extra healthy nutrients and calories between meals  Examples include fruit, cheese, and whole grain crackers  · Drink liquids as directed  to avoid dehydration  Drink liquids between meals if they cause you to get full too quickly during meals  Ask how much liquid to drink each day and which liquids are best for you  · Use herbs, spices, and flavor enhancers to add flavor to foods  Avoid using herbs and spice blends that also contain sodium  Ask your healthcare provider or dietitian about flavor enhancers  Flavor enhancers with ham, natural kapoor, and roast beef flavors can also be sprinkled on food to add flavor  · Share meals with others as often as you can  Eating with others may help you to eat better during meal time  Ask family members, neighbors, or friends to join you for lunch  There are also senior centers where you can meet people, and share meals with them  · Ask family and friends for help  with shopping or preparing foods  Ask for a ride to the grocery store, if needed  © Copyright [employer] 2018 Information is for End User's use only and may not be sold, redistributed or otherwise used for commercial purposes   All illustrations and images included in CareNotes® are the copyrighted property of [employer]  or [employer] [facility] Preventive Visit Patient Instructions  Thank you for completing your Welcome to Medicare Visit or Medicare Annual Wellness Visit today  Your next wellness visit will be due in one year (2/9/2024)  The screening/preventive services that you may require over the next 5-10 years are detailed below  Some tests may not apply to you based off risk factors and/or age  Screening tests ordered at today's visit but not completed yet may show as past due  Also, please note that scanned in results may not display below  Preventive Screenings:  Service Recommendations Previous Testing/Comments   Colorectal Cancer Screening  · Colonoscopy    · Fecal Occult Blood Test (FOBT)/Fecal Immunochemical Test (FIT)  · Fecal DNA/Cologuard Test  · Flexible Sigmoidoscopy Age: 39-70 years old   Colonoscopy: every 10 years (May be performed more frequently if at higher risk)  OR  FOBT/FIT: every 1 year  OR  Cologuard: every 3 years  OR  Sigmoidoscopy: every 5 years  Screening may be recommended earlier than age 39 if at higher risk for colorectal cancer  Also, an individualized decision between you and your healthcare provider will decide whether screening between the ages of 74-80 would be appropriate   Colonoscopy: 01/09/2023  FOBT/FIT: Not on file  Cologuard: Not on file  Sigmoidoscopy: Not on file    Screening Current     Prostate Cancer Screening Individualized decision between patient and health care provider in men between ages of 53-78   Medicare will cover every 12 months beginning on the day after your 50th birthday PSA: 3 0 ng/mL     Screening Current     Hepatitis C Screening Once for adults born between 1945 and 1965  More frequently in patients at high risk for Hepatitis C Hep C Antibody: 03/30/2016    Screening Current   Diabetes Screening 1-2 times per year if you're at risk for diabetes or have pre-diabetes Fasting glucose: 88 mg/dL (12/31/2021)  A1C: 5 6 % (6/25/2021)  Screening Current   Cholesterol Screening Once every 5 years if you don't have a lipid disorder  May order more often based on risk factors  Lipid panel: 07/25/2022  Screening Not Indicated  History Lipid Disorder      Other Preventive Screenings Covered by Medicare:  6  Abdominal Aortic Aneurysm (AAA) Screening: covered once if your at risk  You're considered to be at risk if you have a family history of AAA or a male between the age of 73-68 who smoking at least 100 cigarettes in your lifetime  7  Lung Cancer Screening: covers low dose CT scan once per year if you meet all of the following conditions: (1) Age 50-69; (2) No signs or symptoms of lung cancer; (3) Current smoker or have quit smoking within the last 15 years; (4) You have a tobacco smoking history of at least 20 pack years (packs per day x number of years you smoked); (5) You get a written order from a healthcare provider  8  Glaucoma Screening: covered annually if you're considered high risk: (1) You have diabetes OR (2) Family history of glaucoma OR (3)  aged 48 and older OR (3)  American aged 72 and older  5  Osteoporosis Screening: covered every 2 years if you meet one of the following conditions: (1) Have a vertebral abnormality; (2) On glucocorticoid therapy for more than 3 months; (3) Have primary hyperparathyroidism; (4) On osteoporosis medications and need to assess response to drug therapy  10  HIV Screening: covered annually if you're between the age of 12-76  Also covered annually if you are younger than 13 and older than 72 with risk factors for HIV infection  For pregnant patients, it is covered up to 3 times per pregnancy      Immunizations:  Immunization Recommendations   Influenza Vaccine Annual influenza vaccination during flu season is recommended for all persons aged >= 6 months who do not have contraindications   Pneumococcal Vaccine   * Pneumococcal conjugate vaccine = PCV13 (Prevnar 13), PCV15 (Vaxneuvance), PCV20 (Prevnar 20)  * Pneumococcal polysaccharide vaccine = PPSV23 (Pneumovax) Adults 2364 years old: 1-3 doses may be recommended based on certain risk factors  Adults 72 years old: 1-2 doses may be recommended based off what pneumonia vaccine you previously received   Hepatitis B Vaccine 3 dose series if at intermediate or high risk (ex: diabetes, end stage renal disease, liver disease)   Tetanus (Td) Vaccine - COST NOT COVERED BY MEDICARE PART B Following completion of primary series, a booster dose should be given every 10 years to maintain immunity against tetanus  Td may also be given as tetanus wound prophylaxis  Tdap Vaccine - COST NOT COVERED BY MEDICARE PART B Recommended at least once for all adults  For pregnant patients, recommended with each pregnancy  Shingles Vaccine (Shingrix) - COST NOT COVERED BY MEDICARE PART B  2 shot series recommended in those aged 48 and above     Health Maintenance Due:      Topic Date Due   • Hepatitis C Screening  Completed   • Colorectal Cancer Screening  Discontinued     Immunizations Due:      Topic Date Due   • COVID-19 Vaccine (3 - Booster for Pisano Peter series) 12/29/2022     Advance Directives   What are advance directives? Advance directives are legal documents that state your wishes and plans for medical care  These plans are made ahead of time in case you lose your ability to make decisions for yourself  Advance directives can apply to any medical decision, such as the treatments you want, and if you want to donate organs  What are the types of advance directives? There are many types of advance directives, and each state has rules about how to use them  You may choose a combination of any of the following:  · Living will: This is a written record of the treatment you want  You can also choose which treatments you do not want, which to limit, and which to stop at a certain time   This includes surgery, medicine, IV fluid, and tube feedings  · Durable power of  for healthcare [facility]): This is a written record that states who you want to make healthcare choices for you when you are unable to make them for yourself  This person, called a proxy, is usually a family member or a friend  You may choose more than 1 proxy  · Do not resuscitate (DNR) order:  A DNR order is used in case your heart stops beating or you stop breathing  It is a request not to have certain forms of treatment, such as CPR  A DNR order may be included in other types of advance directives  · Medical directive: This covers the care that you want if you are in a coma, near death, or unable to make decisions for yourself  You can list the treatments you want for each condition  Treatment may include pain medicine, surgery, blood transfusions, dialysis, IV or tube feedings, and a ventilator (breathing machine)  · Values history: This document has questions about your views, beliefs, and how you feel and think about life  This information can help others choose the care that you would choose  Why are advance directives important? An advance directive helps you control your care  Although spoken wishes may be used, it is better to have your wishes written down  Spoken wishes can be misunderstood, or not followed  Treatments may be given even if you do not want them  An advance directive may make it easier for your family to make difficult choices about your care  Underweight  Underweight is defined as having a body mass index (BMI) of less than 18 5 kg/m2   Anorexia  is a loss of appetite, decreased food intake, or both  Your appetite naturally decreases as you get older  You also get full faster than you used to  This occurs because your body needs less energy  Other body changes can also lead to a decreased appetite  Even though some appetite loss is normal, you still need to get enough calories and nutrients to keep you healthy   You can start to lose too much weight if you do not eat as much food as your body needs  Unwanted weight loss can cause health problems, or worsen health problems you already have  You can also become dehydrated if you do not drink enough liquid  How to eat healthy and get enough nutrients:   · Choose healthy foods  Eat a variety of fruits, vegetables, whole grains, low-fat dairy foods, lean meats, and other protein foods  Limit foods high in fat, sugar, and salt  Limit or avoid alcohol as directed  Work with a dietitian to help you plan your meals if you need to follow a special diet  A dietitian can also teach you how to modify foods if you have trouble chewing or swallowing  · Snack on healthy foods between meals  if you only eat a small amount during meals  Snacks provide extra healthy nutrients and calories between meals  Examples include fruit, cheese, and whole grain crackers  · Drink liquids as directed  to avoid dehydration  Drink liquids between meals if they cause you to get full too quickly during meals  Ask how much liquid to drink each day and which liquids are best for you  · Use herbs, spices, and flavor enhancers to add flavor to foods  Avoid using herbs and spice blends that also contain sodium  Ask your healthcare provider or dietitian about flavor enhancers  Flavor enhancers with ham, natural kapoor, and roast beef flavors can also be sprinkled on food to add flavor  · Share meals with others as often as you can  Eating with others may help you to eat better during meal time  Ask family members, neighbors, or friends to join you for lunch  There are also senior centers where you can meet people, and share meals with them  · Ask family and friends for help  with shopping or preparing foods  Ask for a ride to the grocery store, if needed  © Copyright [employer] 2018 Information is for End User's use only and may not be sold, redistributed or otherwise used for commercial purposes   All illustrations and images included in CareNotes® are the copyrighted property of [employer] Inc  or [employer]

## 2023-02-15 ENCOUNTER — TELEPHONE (OUTPATIENT)
Dept: UROLOGY | Facility: AMBULATORY SURGERY CENTER | Age: 73
End: 2023-02-15

## 2023-02-15 DIAGNOSIS — N40.1 BENIGN PROSTATIC HYPERPLASIA WITH URINARY FREQUENCY: ICD-10-CM

## 2023-02-15 DIAGNOSIS — R35.0 BENIGN PROSTATIC HYPERPLASIA WITH URINARY FREQUENCY: ICD-10-CM

## 2023-02-15 DIAGNOSIS — N43.3 HYDROCELE, UNSPECIFIED HYDROCELE TYPE: Primary | ICD-10-CM

## 2023-02-15 RX ORDER — FINASTERIDE 5 MG/1
5 TABLET, FILM COATED ORAL DAILY
Qty: 90 TABLET | Refills: 3 | Status: SHIPPED | OUTPATIENT
Start: 2023-02-15

## 2023-02-15 NOTE — TELEPHONE ENCOUNTER
----- Message from Amy Langston PA-C sent at 2/15/2023  8:22 AM EST -----  Is call patient that ultrasound results have returned  Overall there is an improvement in inflammation and collection of fluid seen in his scrotum  I have ordered another ultrasound to be obtained in 2 months to monitor for a complex cyst they saw in the epididymis as well to ensure resolution of inflammation/collection of fluid  He should get the ultrasound in mid April to mid May  please notify patient that he should call office if he develops fever, chills, painful urination, frequency, scrotal/testicular pain etc  we will continue with plan as discussed in office with obtainment of MRI of the prostate as well as a cystoscopy with Dr Dustin Falk on 3/23       Spoke with pt and advised of everything above  Provided pt phone number to [facility] to set up return U/S in Mid April-May per above recommendations  Pt is scheduled for MRI of prostate on 2/27 and cysto with Dr Dustin Falk on 3/23/23  Pt also requested if a new script of Finasteride could be sent to [facility] on [facility] in [place], due to [employer] being out of it

## 2023-02-27 ENCOUNTER — HOSPITAL ENCOUNTER (OUTPATIENT)
Dept: RADIOLOGY | Age: 73
Discharge: HOME | End: 2023-02-27

## 2023-02-27 DIAGNOSIS — R97.20 ELEVATED PSA: ICD-10-CM

## 2023-02-27 RX ADMIN — GADOBUTROL 7 ML: 604.72 INJECTION INTRAVENOUS at 15:20

## 2023-03-06 ENCOUNTER — TELEPHONE (OUTPATIENT)
Dept: UROLOGY | Facility: AMBULATORY SURGERY CENTER | Age: 73
End: 2023-03-06

## 2023-03-06 NOTE — TELEPHONE ENCOUNTER
----- Message from Maggy Johnson PA-C sent at 3/6/2023  2:14 PM EST -----  Please call patient to inform him that MRI of the prostate demonstrates that there is low likelihood that clinically significant prostate cancer is present  Can discuss further at follow-up/cysto appointment on 4/26  Spoke with pt and advised of everything as stated above  Explained how PI-RADS scoring works and importance of follow-up with cysto for BPH was reinforced  Pt understood and is in agreement with plan

## 2023-04-25 NOTE — PROGRESS NOTES
Assessment/Plan:    Benign prostatic hyperplasia with urinary retention  The patient has a very large prostate measuring approximately 280 g based on imaging studies  He is bothered by obstructive and irritative voiding symptoms  He has a moderate postvoid residual   He is on finasteride monotherapy since he does not tolerate alpha blockers  We discussed options for procedural intervention  Given the size of his gland these center on robotic simple prostatectomy versus  prostate artery embolization as I think his prostate is too large for HoLEP  I think he is better served by robot simple prostatectomy given his large median lobe    Discussed the risks and benefits of each  Robotic simple prostatectomy offers excellent functional outcomes requires entry into the abdomen and catheter for 2 weeks while the bladder is healing with cystogram to follow and has risks of bleeding urine leak bowel injury and usually temporary incontinence  Prostate artery embolization is performed by Interventional Radiology through a minimally invasive approach through femoral vessels  This is a relatively new technology that has not been studied in a wide population but limited results show good efficacy but will take time for them to result as the prostate shrinks over time  This point the patient does not want to pursue any interventions  He will stay on finasteride  We will see him back in 3 months  If he changes his mind we will happily refer to interventional radiology for PAD knowing it may not be entirely effective given his median lobe  If he chooses to move forward with robot simple prostatectomy I will see the patient back to discuss further and arrange surgery            Subjective:      Patient ID: Clara Flores is a 67 y o  male  HPI    Orlando Flores is a 26-year-old male with a past urologic history significant for an elevated PSA, voiding issues and orchitis      He is bothered by frequency, urgency, nocturia "x1-2  as well as obstructive symptoms including straining to urinate, hesitancy, intermittency at baseline   cc  He has been tried on Flomax but did not tolerate it well  He has been on finasteride since 2019  His prostate on CT scan measures 8 6 x 7 6 x 8 1 cm = 285g  Cystoscopy today showed trilobar hypertrophy with moderate trabeculations and no diverticula  TRUS volume 250 cc although hard to be accurate given median lobe and inadequate ultrasound depth      Patient had an episode of right-sided orchitis January 2023 associated with urinary tract infection growing E  Coli  Ultrasound at the time showed inflammation of the in the right epididymis with mildly complex bilateral hydroceles  he patient has history of elevated PSA measuring at 5 1 and 5 2 in 2019, negative prostate biopsy in 2018, known history of very large prostate  He started finasteride in 2019 and PSA has declined since then been stable most recently 3 0 in 2023          Past Surgical History:   Procedure Laterality Date   • ABDOMINAL SURGERY      \"intestine surgery\"    • BUNIONECTOMY     • COLONOSCOPY     • HERNIA REPAIR  2010   • HERNIA REPAIR  2016   • HERNIA REPAIR     • GA CORRJ [address] W/SESMDC W/DIST Shelva Bark Right 10/12/2020    Procedure: Jadon Pinto, DOUBLE OSTEOTOMY;  Surgeon: Erin Hassan DPM;  Location: [facility] MAIN OR;  Service: Podiatry   • GA EXCISION TUMOR SOFT TIS BACK/FLANK SUBQ 3 CM/> N/A 11/12/2021    Procedure: EXCISION  BIOPSY LESION/MASS BACK;  Surgeon: Luda Diallo MD;  Location: [facility] MAIN OR;  Service: General   • GA EXCISION TUMOR SOFT TISSUE SHOULDER SUBQ 3 CM/> Right 11/12/2021    Procedure: EXCISION BIOPSY TISSUE LESION/MASS UPPER EXTREMITY;  Surgeon: Luda Diallo MD;  Location: [facility] MAIN OR;  Service: General        Past Medical History:   Diagnosis Date   • Anemia     B12 def   • Bunion of right foot 09/17/2020   • Colon polyp    • Constipation    • COVID-19 06/2022   • " "Depression 5/31/2022   • Glaucoma     suspect   • Mixed hyperlipidemia 01/09/2019   • Pre-syncope 05/31/2022   • Spasm of abdominal muscles of left side 01/29/2020             Review of Systems   Constitutional: Negative for chills and fever  HENT: Negative for ear pain and sore throat  Eyes: Negative for pain and visual disturbance  Respiratory: Negative for cough and shortness of breath  Cardiovascular: Negative for chest pain and palpitations  Gastrointestinal: Negative for abdominal pain and vomiting  Genitourinary: Positive for difficulty urinating and frequency  Negative for dysuria and hematuria  Musculoskeletal: Negative for arthralgias and back pain  Skin: Negative for color change and rash  Neurological: Negative for seizures and syncope  All other systems reviewed and are negative  Objective:      /68 (BP Location: Left arm, Patient Position: Sitting, Cuff Size: Adult)   Pulse 66   Ht 5' 9\" (1 753 m)   Wt 68 9 kg (152 lb)   SpO2 99%   BMI 22 45 kg/m²     Lab Results   Component Value Date    PSA 3 0 01/06/2023    PSA 2 7 12/31/2021    PSA 3 0 12/15/2020    PSA 2 9 10/02/2020    PSA 3 3 04/08/2020    PSA 5 2 (H) 10/07/2019    PSA 5 1 (H) 03/18/2019          Physical Exam  Vitals reviewed  Constitutional:       Appearance: Normal appearance  He is normal weight  HENT:      Head: Normocephalic and atraumatic  Eyes:      Pupils: Pupils are equal, round, and reactive to light  Abdominal:      General: Abdomen is flat  Neurological:      General: No focal deficit present  Mental Status: He is alert and oriented to person, place, and time  Psychiatric:         Mood and Affect: Mood normal          Thought Content: Thought content normal         \        Cystoscopy     Date/Time 4/26/2023 10:29 AM     Performed by  Agustina Mcgowan MD     Authorized by Agustina Mcgowan MD      Universal Protocol:  Consent: Written consent obtained    Risks and benefits: risks, " "benefits and alternatives were discussed  Consent given by: patient  Time out: Immediately prior to procedure a \"time out\" was called to verify the correct patient, procedure, equipment, support staff and site/side marked as required  Patient understanding: patient states understanding of the procedure being performed  Patient consent: the patient's understanding of the procedure matches consent given  Procedure consent: procedure consent matches procedure scheduled  Patient identity confirmed: verbally with patient        Procedure Details:  Procedure type: cystoscopy    Patient tolerance: Patient tolerated the procedure well with no immediate complications    Additional Procedure Details: A time-out was performed identifying the correct patient site and procedure  A MA chaperone was in the room  A flexible cystoscope was introduced into the urethra  The pendulous urethra was normal   The prostatic urethra showed significant bilateral lobar hypertrophy with a large median lobe   the bladder did not have any lesions concerning for malignancy  There were moderate trabeculations and no diverticula  The ureteral orifices were not seen but appeared to be in orthotopic position  Biopsy prostate     Date/Time 4/26/2023 10:30 AM     Performed by  Destiny Crawford MD     Authorized by Destiny Crawford MD      Universal Protocol   Consent: Written consent obtained  Consent given by: patient  Time out: Immediately prior to procedure a \"time out\" was called to verify the correct patient, procedure, equipment, support staff and site/side marked as required    Patient understanding: patient states understanding of the procedure being performed  Patient consent: the patient's understanding of the procedure matches consent given  Procedure consent: procedure consent matches procedure scheduled  Relevant documents: relevant documents present and verified  Patient identity confirmed: verbally with patient        Local " anesthesia used: no     Anesthesia   Local anesthesia used: no     Sedation   Patient sedated: no        Specimen: no   Procedure Details   Procedure Notes: The patient was placed in left lateral decubitus position  An ultrasound probe was introduced into the rectum    The prostate was evaluated and measurements made showing the prostate to be  250 cc in size although it was hard to characterize well because the depth of the ultrasound did not capture the entire prostate height and hard to account for median lobe  Patient tolerance: patient tolerated the procedure well with no immediate complications               Orders  Orders Placed This Encounter   Procedures   • Cystoscopy     This order was created via procedure documentation   • Biopsy prostate     This order was created via procedure documentation

## 2023-04-26 ENCOUNTER — PROCEDURE VISIT (OUTPATIENT)
Dept: UROLOGY | Facility: CLINIC | Age: 73
End: 2023-04-26

## 2023-04-26 VITALS
WEIGHT: 152 LBS | HEIGHT: 69 IN | HEART RATE: 66 BPM | BODY MASS INDEX: 22.51 KG/M2 | SYSTOLIC BLOOD PRESSURE: 116 MMHG | DIASTOLIC BLOOD PRESSURE: 68 MMHG | OXYGEN SATURATION: 99 %

## 2023-04-26 DIAGNOSIS — N40.1 BENIGN PROSTATIC HYPERPLASIA WITH URINARY RETENTION: Primary | ICD-10-CM

## 2023-04-26 DIAGNOSIS — R33.8 BENIGN PROSTATIC HYPERPLASIA WITH URINARY RETENTION: Primary | ICD-10-CM

## 2023-04-26 NOTE — ASSESSMENT & PLAN NOTE
The patient has a very large prostate measuring approximately 280 g based on imaging studies  He is bothered by obstructive and irritative voiding symptoms  He has a moderate postvoid residual   He is on finasteride monotherapy since he does not tolerate alpha blockers  We discussed options for procedural intervention  Given the size of his gland these center on robotic simple prostatectomy versus  prostate artery embolization as I think his prostate is too large for HoLEP  I think he is better served by robot simple prostatectomy given his large median lobe    Discussed the risks and benefits of each  Robotic simple prostatectomy offers excellent functional outcomes requires entry into the abdomen and catheter for 2 weeks while the bladder is healing with cystogram to follow and has risks of bleeding urine leak bowel injury and usually temporary incontinence  Prostate artery embolization is performed by Interventional Radiology through a minimally invasive approach through femoral vessels  This is a relatively new technology that has not been studied in a wide population but limited results show good efficacy but will take time for them to result as the prostate shrinks over time  This point the patient does not want to pursue any interventions  He will stay on finasteride  We will see him back in 3 months  If he changes his mind we will happily refer to interventional radiology for PAD knowing it may not be entirely effective given his median lobe    If he chooses to move forward with robot simple prostatectomy I will see the patient back to discuss further and arrange surgery

## 2023-07-31 ENCOUNTER — OFFICE VISIT (OUTPATIENT)
Dept: UROLOGY | Facility: CLINIC | Age: 73
End: 2023-07-31
Payer: COMMERCIAL

## 2023-07-31 VITALS
HEIGHT: 69 IN | BODY MASS INDEX: 21.92 KG/M2 | SYSTOLIC BLOOD PRESSURE: 118 MMHG | DIASTOLIC BLOOD PRESSURE: 70 MMHG | WEIGHT: 148 LBS

## 2023-07-31 DIAGNOSIS — N40.1 BPH WITH OBSTRUCTION/LOWER URINARY TRACT SYMPTOMS: Primary | ICD-10-CM

## 2023-07-31 DIAGNOSIS — N13.8 BPH WITH OBSTRUCTION/LOWER URINARY TRACT SYMPTOMS: Primary | ICD-10-CM

## 2023-07-31 PROCEDURE — 99213 OFFICE O/P EST LOW 20 MIN: CPT | Performed by: PHYSICIAN ASSISTANT

## 2023-08-01 NOTE — PROGRESS NOTES
UROLOGY PROGRESS NOTE   Patient Identifiers: Cruz Adams (MRN [id number])  Date of Service: 8/1/2023    Subjective:   50-year-old man history of very large obstructing BPH. He has been on finasteride only and apparently has not tolerated alpha blockers. Procedures discussed were robotic simple prostatectomy and PA E. He feels his urinary symptoms are satisfactory although he is interested in prostate artery embolization.   Current PSA 3.0.    Reason for visit: Large obstructing BPH follow-up    Objective:     VITALS:    Vitals:    07/31/23 1047   BP: 118/70     AUA SYMPTOM SCORE    Flowsheet Row Most Recent Value   AUA SYMPTOM SCORE    How often have you had a sensation of not emptying your bladder completely after you finished urinating? 1 (P)     How often have you had to urinate again less than two hours after you finished urinating? 1 (P)     How often have you found you stopped and started again several times when you urinate? 2 (P)     How often have you found it difficult to postpone urination? 2 (P)     How often have you had a weak urinary stream? 2 (P)     How often have you had to push or strain to begin urination? 2 (P)     How many times did you most typically get up to urinate from the time you went to bed at night until the time you got up in the morning? 2 (P)     Quality of Life: If you were to spend the rest of your life with your urinary condition just the way it is now, how would you feel about that? 4 (P)     AUA SYMPTOM SCORE 12 (P)             LABS:  Lab Results   Component Value Date    HGB 12.9 01/19/2023    HCT 41.7 01/19/2023    WBC 12.42 (H) 01/19/2023     01/19/2023   ]    Lab Results   Component Value Date    K 3.6 01/19/2023     01/19/2023    CO2 24 01/19/2023    BUN 17 01/19/2023    CREATININE 0.94 01/19/2023    CALCIUM 9.2 01/19/2023   ]        INPATIENT MEDS:    Current Outpatient Medications:   •  Ascorbic Acid (VITAMIN C PO), Take by mouth, Disp: , Rfl:   • Cyanocobalamin (VITAMIN B-12) 2500 MCG SUBL, Place 1 tablet under the tongue daily, Disp: , Rfl:   •  docusate sodium (COLACE) 100 mg capsule, Take 100 mg by mouth as needed , Disp: , Rfl:   •  dorzolamide-timolol (COSOPT) 22.3-6.8 MG/ML ophthalmic solution, , Disp: , Rfl:   •  finasteride (PROSCAR) 5 mg tablet, Take 1 tablet (5 mg total) by mouth daily, Disp: 90 tablet, Rfl: 3  •  latanoprost (XALATAN) 0.005 % ophthalmic solution, Administer 1 drop to both eyes daily at bedtime , Disp: , Rfl:   •  Multiple Vitamin (MULTIVITAMIN) tablet, Take 1 tablet by mouth as needed, Disp: , Rfl:   •  Multiple Vitamins-Minerals (PRESERVISION AREDS PO), Take 1 capsule by mouth 2 (two) times a day, Disp: , Rfl:   •  VITAMIN D PO, Take by mouth as needed, Disp: , Rfl:       Physical Exam:   /70 (BP Location: Left arm, Patient Position: Sitting, Cuff Size: Adult)   Ht 5' 9" (1.753 m)   Wt 67.1 kg (148 lb)   BMI 21.86 kg/m²   GEN: no acute distress    RESP: breathing comfortably with no accessory muscle use    ABD: soft, non-tender, non-distended   INCISION:    EXT: no significant peripheral edema       RADIOLOGY:   IMPRESSION:     1. PI-RADSv2.1 Category 2 - Low (clinically significant cancer is unlikely to be present). 2. Marked BPH with massively enlarged prostate gland. Calculated prostate volume of 245 cc.          Assessment:   #1.  Large obstructing BPH    Plan:   -I placed an ambulatory referral to interventional radiology for consultation regarding PAD-  -If he has the procedure done I will see him back in a few months  -If not he can come back in 1 year for his annual exam with PSA prior

## 2023-08-02 ENCOUNTER — APPOINTMENT (OUTPATIENT)
Dept: LAB | Facility: CLINIC | Age: 73
End: 2023-08-02
Payer: COMMERCIAL

## 2023-08-02 DIAGNOSIS — D72.829 LEUKOCYTOSIS, UNSPECIFIED TYPE: ICD-10-CM

## 2023-08-02 DIAGNOSIS — E78.2 MODERATE MIXED HYPERLIPIDEMIA NOT REQUIRING STATIN THERAPY: ICD-10-CM

## 2023-08-02 LAB
ALBUMIN SERPL BCP-MCNC: 3.7 G/DL (ref 3.5–5)
ALP SERPL-CCNC: 60 U/L (ref 46–116)
ALT SERPL W P-5'-P-CCNC: 15 U/L (ref 12–78)
ANION GAP SERPL CALCULATED.3IONS-SCNC: 4 MMOL/L
AST SERPL W P-5'-P-CCNC: 13 U/L (ref 5–45)
BILIRUB SERPL-MCNC: 0.69 MG/DL (ref 0.2–1)
BUN SERPL-MCNC: 19 MG/DL (ref 5–25)
CALCIUM SERPL-MCNC: 9 MG/DL (ref 8.3–10.1)
CHLORIDE SERPL-SCNC: 114 MMOL/L (ref 96–108)
CHOLEST SERPL-MCNC: 211 MG/DL
CO2 SERPL-SCNC: 26 MMOL/L (ref 21–32)
CREAT SERPL-MCNC: 1.2 MG/DL (ref 0.6–1.3)
ERYTHROCYTE [DISTWIDTH] IN BLOOD BY AUTOMATED COUNT: 17.3 % (ref 11.6–15.1)
GFR SERPL CREATININE-BSD FRML MDRD: 59 ML/MIN/1.73SQ M
GLUCOSE P FAST SERPL-MCNC: 88 MG/DL (ref 65–99)
HCT VFR BLD AUTO: 43.3 % (ref 36.5–49.3)
HDLC SERPL-MCNC: 93 MG/DL
HGB BLD-MCNC: 13 G/DL (ref 12–17)
LDLC SERPL CALC-MCNC: 100 MG/DL (ref 0–100)
MCH RBC QN AUTO: 21.5 PG (ref 26.8–34.3)
MCHC RBC AUTO-ENTMCNC: 30 G/DL (ref 31.4–37.4)
MCV RBC AUTO: 72 FL (ref 82–98)
NONHDLC SERPL-MCNC: 118 MG/DL
PLATELET # BLD AUTO: 243 THOUSANDS/UL (ref 149–390)
PMV BLD AUTO: 11 FL (ref 8.9–12.7)
POTASSIUM SERPL-SCNC: 3.7 MMOL/L (ref 3.5–5.3)
PROT SERPL-MCNC: 6.8 G/DL (ref 6.4–8.4)
RBC # BLD AUTO: 6.06 MILLION/UL (ref 3.88–5.62)
SODIUM SERPL-SCNC: 144 MMOL/L (ref 135–147)
TRIGL SERPL-MCNC: 90 MG/DL
WBC # BLD AUTO: 5.42 THOUSAND/UL (ref 4.31–10.16)

## 2023-08-02 PROCEDURE — 85027 COMPLETE CBC AUTOMATED: CPT

## 2023-08-02 PROCEDURE — 80053 COMPREHEN METABOLIC PANEL: CPT

## 2023-08-02 PROCEDURE — 80061 LIPID PANEL: CPT

## 2023-08-02 PROCEDURE — 36415 COLL VENOUS BLD VENIPUNCTURE: CPT

## 2023-08-08 ENCOUNTER — TELEMEDICINE (OUTPATIENT)
Dept: INTERVENTIONAL RADIOLOGY/VASCULAR | Facility: CLINIC | Age: 73
End: 2023-08-08
Payer: COMMERCIAL

## 2023-08-08 DIAGNOSIS — N40.1 BPH WITH OBSTRUCTION/LOWER URINARY TRACT SYMPTOMS: ICD-10-CM

## 2023-08-08 DIAGNOSIS — N13.8 BPH WITH OBSTRUCTION/LOWER URINARY TRACT SYMPTOMS: ICD-10-CM

## 2023-08-08 PROCEDURE — 99443 PR PHYS/QHP TELEPHONE EVALUATION 21-30 MIN: CPT | Performed by: RADIOLOGY

## 2023-08-08 NOTE — PROGRESS NOTES
Virtual Brief Visit    This Visit is being completed by telephone. The Patient is located at Home and in the following state in which I hold an active license [place]    The patient was identified by name and date of birth. Felicita Park was informed that this is a telemedicine visit and that the visit is being conducted through the [employer]. He agrees to proceed. .  My office door was closed. No one else was in the room. He acknowledged consent and understanding of privacy and security of the video platform. The patient has agreed to participate and understands they can discontinue the visit at any time. Patient is aware this is a billable service. Assessment/Plan:    Problem List Items Addressed This Visit        Genitourinary    BPH with obstruction/lower urinary tract symptoms     I had a long conversation with the patient over the phone. This is a 72M. He has moderate postvoid residual volume. He has significant lower urinary tract symptoms at baseline with chief complaints of obstructive and irritative voiding symptoms. Per Dr. Faina Mckay evaluation, his lower uterine tract symptoms are likely primarily to bladder outlet obstruction due to a massively enlarged prostate; other superimposed etiologies are also possible. Prostatectomy and prostate artery embolization (PAE) were discussed with him by Dr. Trey Mason and he opted to proceed with PAE. We discussed that PAE is an excellent up front option for lower urinary tract symptoms caused by an enlarged prostate. His prostate was reported to be 280g. There have been numerous retrospective studies, a number of randomized trials including a sham trial and meta analyses showing efficacy of PAE in improving subjective lower urinary tract symptoms and quality of life (1,2,4). The duration of benefit is at least up to 1 year if not longer.   The safety profile of PAE is excellent with significantly less adverse events and shorter hospital stay. There has been suggestion of approximately 20% chance of requiring additional treatment within 2 years due to the waning treatment effect from PAE or unsatisfied initial outcome (3). We discussed that in case of recurrent or residual symptoms, repeat PAE is technically feasible and PAE does not preclude the patient from having future TURP or any other minimally invasive surgical therapies if he ultimately desires. We discussed some technical details of PAE and what to anticipate on the day of the procedure. I informed him that I typically perform PAE with anesthesia support given the occasional long duration of this procedure. We discussed the theoretical risk of bleeding, infection and nontarget embolization. It is expected that he will experience some degree of post embolization syndrome including dysuria, urethral burning and pollakiuria for a few days. He is expected to be discharged several hours after the procedure and infrequently required to stay overnight. After our discussion, he is in agreement to proceed with scheduling for PAE.    - PAE order placed  - NPO after midnight before the procedure    Reference:  1) Rodney AF, Genevieve Muss, Kristy MJ, Herrin NT, [address] [place] [address], BETHANY T, Jadiel S, Lorraine J, Donny M, Danae G, Abida N. Efficacy and safety of prostate artery embolization for benign prostatic hyperplasia: an observational study and propensity-matched comparison with transurethral resection of the prostate (the UK-ROPE study). BJU Int. 2018 Aug;122(2):270-282. doi: 10.1111/bju.48269. Epub 2018 May 6. PMID: 49152168. 2) [address] [place] [address], [address], [address] [place] [address], [address] [place] [address]. Randomised Clinical Trial of Prostatic Artery Embolisation Versus a Sham Procedure for Benign Prostatic Hyperplasia. Eur Urol. 2020 Mar;77(3):354-362. doi: 10.1016/j.eururo. 2019.11.010. Epub 2019 Dec 10. PMID: 99008872.    3) Nathanael GOVEA, Manfred COLVIN, Selvin CONNORS, Cory RITTER, Samantha S, Ben HP, Juan L, Ashley DS. Prostatic Artery Embolisation Versus Transurethral Resection of the Prostate for Benign Prostatic Hyperplasia: 2-yr Outcomes of a Randomised, Open-label, Single-centre Trial. Eur Urol. 2021 Jul;80(1):34-42. doi: 10.1016/j.eururo.2021.02.008. Epub 2021 Feb 19. PMID: 71138364. 4) Antonella Davalos, Jett, [address] [place] [address] Systematic Review and [place]-analysis Comparing Prostatic Artery Embolization to Gold-Standard Transurethral Resection of the Prostate for Benign Prostatic Hyperplasia. Cardiovasc Intervent Radiol. 2021 Feb;44(2):183-193. doi: 10.1007/l57736-178-15046-4. Epub 2020 Oct 19. PMID: 99091514. Relevant Orders    IR prostate artery embolization       Recent Visits  Date Type Provider Dept   08/09/23 Office Visit Eva Mao MD [facility]   08/08/23 Telemedicine Arpan Teresa MD [facility] recent visits within past 7 days and meeting all other requirements  Future Appointments  No visits were found meeting these conditions.   Showing future appointments within next 150 days and meeting all other requirements         Visit Time  Total Visit Duration: 30 mins

## 2023-08-08 NOTE — LETTER
August 10, 2023     Sara Rowe MD  [address]    Patient: Anibal Moeller   YOB: 1950   Date of Visit: 8/8/2023       Dear Dr. Nona Conrad: Thank you for referring Anibal Moeller to me for evaluation. Below are my notes for this consultation. If you have questions, please do not hesitate to call me. I look forward to following your patient along with you. Sincerely,        Candelario Mcfarland MD        CC: ERICK Mirza MD  8/10/2023  3:56 PM  Sign when Signing Visit    Virtual Brief Visit    This Visit is being completed by telephone. The Patient is located at Home and in the following state in which I hold an active license [place]    The patient was identified by name and date of birth. Anibal Moeller was informed that this is a telemedicine visit and that the visit is being conducted through the [employer]. He agrees to proceed. .  My office door was closed. No one else was in the room. He acknowledged consent and understanding of privacy and security of the video platform. The patient has agreed to participate and understands they can discontinue the visit at any time. Patient is aware this is a billable service. Assessment/Plan:    Problem List Items Addressed This Visit          Genitourinary    BPH with obstruction/lower urinary tract symptoms     I had a long conversation with the patient over the phone. This is a 72M. He has moderate postvoid residual volume. He has significant lower urinary tract symptoms at baseline with chief complaints of obstructive and irritative voiding symptoms. Per Dr. Alice Rey evaluation, his lower uterine tract symptoms are likely primarily to bladder outlet obstruction due to a massively enlarged prostate; other superimposed etiologies are also possible.     Prostatectomy and prostate artery embolization (PAE) were discussed with him by Dr. Nona Conrad and he opted to proceed with PAE. We discussed that PAE is an excellent up front option for lower urinary tract symptoms caused by an enlarged prostate. His prostate was reported to be 280g. There have been numerous retrospective studies, a number of randomized trials including a sham trial and meta analyses showing efficacy of PAE in improving subjective lower urinary tract symptoms and quality of life (1,2,4). The duration of benefit is at least up to 1 year if not longer. The safety profile of PAE is excellent with significantly less adverse events and shorter hospital stay. There has been suggestion of approximately 20% chance of requiring additional treatment within 2 years due to the waning treatment effect from PAE or unsatisfied initial outcome (3). We discussed that in case of recurrent or residual symptoms, repeat PAE is technically feasible and PAE does not preclude the patient from having future TURP or any other minimally invasive surgical therapies if he ultimately desires. We discussed some technical details of PAE and what to anticipate on the day of the procedure. I informed him that I typically perform PAE with anesthesia support given the occasional long duration of this procedure. We discussed the theoretical risk of bleeding, infection and nontarget embolization. It is expected that he will experience some degree of post embolization syndrome including dysuria, urethral burning and pollakiuria for a few days. He is expected to be discharged several hours after the procedure and infrequently required to stay overnight. After our discussion, he is in agreement to proceed with scheduling for PAE.    - PAE order placed  - NPO after midnight before the procedure    Reference:  1) Rodney PAIZ, Dina Verduzco, Kristy MJ, Drew NT, [address], BETHANY T, Jadiel S, Lorraine J, Donny M, Danae G, Abida N.  Efficacy and safety of prostate artery embolization for benign prostatic hyperplasia: an observational study and propensity-matched comparison with transurethral resection of the prostate (the UK-ROPE study). BJU Int. 2018 Aug;122(2):270-282. doi: 10.1111/bju.61675. Epub 2018 May 6. PMID: 54422586. 2) [address] [facility] [address], [facility], [address] [place] [address] [facility], [address] [place] [address], [facility]. Randomised Clinical Trial of Prostatic Artery Embolisation Versus a Sham Procedure for Benign Prostatic Hyperplasia. Eur Urol. 2020 Mar;77(3):354-362. doi: 10.1016/j.eururo. 2019.11.010. Epub 2019 Dec 10. PMID: 93663540. 3) Nathanael D, Manfred G, Selvin L, Cory S, Samantha S, Ben HP, Juan L, Ashley DS. Prostatic Artery Embolisation Versus Transurethral Resection of the Prostate for Benign Prostatic Hyperplasia: 2-yr Outcomes of a Randomised, Open-label, Single-centre Trial. Eur Urol. 2021 Jul;80(1):34-42. doi: 10.1016/j.eururo.2021.02.008. Epub 2021 Feb 19. PMID: 23529565. 4) Robe Gaitan, Antonella A, Jett, [facility] and [place]-analysis Comparing Prostatic Artery Embolization to Gold-Standard Transurethral Resection of the Prostate for Benign Prostatic Hyperplasia. Cardiovasc Intervent Radiol. 2021 Feb;44(2):183-193. doi: 10.1007/a75016-543-98429-1. Epub 2020 Oct 19. PMID: 48748873. Relevant Orders    IR prostate artery embolization       Recent Visits  Date Type Provider Dept   08/09/23 Office Visit Leonardo Garza MD [facility]   08/08/23 Telemedicine Margot Mcghee MD [facility] recent visits within past 7 days and meeting all other requirements  Future Appointments  No visits were found meeting these conditions.   Showing future appointments within next 150 days and meeting all other requirements         Visit Time  Total Visit Duration: 30 mins

## 2023-08-09 ENCOUNTER — OFFICE VISIT (OUTPATIENT)
Dept: INTERNAL MEDICINE CLINIC | Facility: OTHER | Age: 73
End: 2023-08-09
Payer: COMMERCIAL

## 2023-08-09 VITALS
SYSTOLIC BLOOD PRESSURE: 120 MMHG | DIASTOLIC BLOOD PRESSURE: 68 MMHG | HEIGHT: 69 IN | WEIGHT: 151 LBS | BODY MASS INDEX: 22.36 KG/M2 | HEART RATE: 55 BPM | OXYGEN SATURATION: 98 % | TEMPERATURE: 98.2 F

## 2023-08-09 DIAGNOSIS — R71.8 LOW MEAN CORPUSCULAR VOLUME (MCV): ICD-10-CM

## 2023-08-09 DIAGNOSIS — E78.2 MODERATE MIXED HYPERLIPIDEMIA NOT REQUIRING STATIN THERAPY: ICD-10-CM

## 2023-08-09 DIAGNOSIS — E44.1 MILD PROTEIN-CALORIE MALNUTRITION (HCC): ICD-10-CM

## 2023-08-09 DIAGNOSIS — E55.9 VITAMIN D DEFICIENCY: ICD-10-CM

## 2023-08-09 DIAGNOSIS — K59.09 OTHER CONSTIPATION: ICD-10-CM

## 2023-08-09 DIAGNOSIS — R33.8 BENIGN PROSTATIC HYPERPLASIA WITH URINARY RETENTION: Primary | ICD-10-CM

## 2023-08-09 DIAGNOSIS — N18.30 STAGE 3 CHRONIC KIDNEY DISEASE, UNSPECIFIED WHETHER STAGE 3A OR 3B CKD (HCC): ICD-10-CM

## 2023-08-09 DIAGNOSIS — N40.1 BENIGN PROSTATIC HYPERPLASIA WITH URINARY RETENTION: Primary | ICD-10-CM

## 2023-08-09 PROCEDURE — 99214 OFFICE O/P EST MOD 30 MIN: CPT | Performed by: INTERNAL MEDICINE

## 2023-08-09 RX ORDER — QUINIDINE GLUCONATE 324 MG
240 TABLET, EXTENDED RELEASE ORAL DAILY
Qty: 90 TABLET | Refills: 3 | Status: SHIPPED | OUTPATIENT
Start: 2023-08-09

## 2023-08-09 NOTE — ASSESSMENT & PLAN NOTE
Lab Results   Component Value Date    EGFR 59 08/02/2023    EGFR 80 01/19/2023    EGFR 68 05/28/2022    CREATININE 1.20 08/02/2023    CREATININE 0.94 01/19/2023    CREATININE 1.07 05/28/2022   Continue to trend kidney function. Avoid nephrotoxic agents.

## 2023-08-09 NOTE — PROGRESS NOTES
Assessment/Plan:    Stage 3 chronic kidney disease, unspecified whether stage 3a or 3b CKD (HCC)  Lab Results   Component Value Date    EGFR 59 08/02/2023    EGFR 80 01/19/2023    EGFR 68 05/28/2022    CREATININE 1.20 08/02/2023    CREATININE 0.94 01/19/2023    CREATININE 1.07 05/28/2022   Continue to trend kidney function. Avoid nephrotoxic agents. Benign prostatic hyperplasia with urinary retention  He has follow-up scheduled with urology. Continue finasteride. Vitamin D deficiency  Continue vitamin supplementation. Other constipation  Stable. Continue home bowel regimen. Low mean corpuscular volume (MCV)  Plan will be to start iron supplementation. Diagnoses and all orders for this visit:    Benign prostatic hyperplasia with urinary retention    Vitamin D deficiency    Other constipation    Moderate mixed hyperlipidemia not requiring statin therapy    Mild protein-calorie malnutrition (HCC)    Low mean corpuscular volume (MCV)  -     ferrous gluconate (FERGON) 240 (27 FE) MG tablet; Take 1 tablet (240 mg total) by mouth in the morning    Stage 3 chronic kidney disease, unspecified whether stage 3a or 3b CKD ([facility])            Depression Screening and Follow-up Plan: Patient was screened for depression during today's encounter. They screened negative with a PHQ-2 score of 0. Subjective:      Patient ID: Gwen Romero is a 67 y.o. male. Chief Complaint   Patient presents with   • Follow-up     6 month follow up   Labs done: 8/2/2023       67year old male is seen today for follow up of chronic conditions. Labs reviewed: CBC is stable although with a MCV of 72. Hb stable. Lipid panel and CMP are within acvceptable range. He is scheduled to undergo prostate artery embolectomy. He has no active complaints ore concerns at this time. Constipation  This is a chronic problem. The current episode started more than 1 year ago. The problem is unchanged.  Pertinent negatives include no abdominal pain, diarrhea, difficulty urinating, fever, nausea or vomiting. Past treatments include diet changes, fiber and stool softeners. The treatment provided moderate relief. The following portions of the patient's history were reviewed and updated as appropriate: allergies, current medications, past family history, past medical history, past social history, past surgical history and problem list.    Review of Systems   Constitutional: Negative for activity change, appetite change, chills, diaphoresis, fatigue and fever. HENT: Negative for congestion, postnasal drip, rhinorrhea, sinus pressure, sinus pain, sneezing and sore throat. Eyes: Negative for visual disturbance. Respiratory: Negative for apnea, cough, choking, chest tightness, shortness of breath and wheezing. Cardiovascular: Negative for chest pain, palpitations and leg swelling. Gastrointestinal: Negative for abdominal distention, abdominal pain, anal bleeding, blood in stool, constipation, diarrhea, nausea and vomiting. Endocrine: Negative for cold intolerance and heat intolerance. Genitourinary: Negative for difficulty urinating, dysuria and hematuria. Musculoskeletal: Negative. Skin: Negative. Neurological: Negative for dizziness, weakness, light-headedness, numbness and headaches. Hematological: Negative for adenopathy. Psychiatric/Behavioral: Negative for agitation, sleep disturbance and suicidal ideas. All other systems reviewed and are negative.         Past Medical History:   Diagnosis Date   • Anemia     B12 def   • Bunion of right foot 09/17/2020   • Colon polyp    • Constipation    • COVID-19 06/2022   • Depression 5/31/2022   • Glaucoma     suspect   • Mixed hyperlipidemia 01/09/2019   • Moderate mixed hyperlipidemia not requiring statin therapy 1/9/2019   • Pre-syncope 05/31/2022   • Spasm of abdominal muscles of left side 01/29/2020         Current Outpatient Medications:   •  Ascorbic Acid (VITAMIN C PO), Take by mouth, Disp: , Rfl:   •  Cyanocobalamin (VITAMIN B-12) 2500 MCG SUBL, Place 1 tablet under the tongue daily, Disp: , Rfl:   •  docusate sodium (COLACE) 100 mg capsule, Take 100 mg by mouth as needed , Disp: , Rfl:   •  dorzolamide-timolol (COSOPT) 22.3-6.8 MG/ML ophthalmic solution, , Disp: , Rfl:   •  ferrous gluconate (FERGON) 240 (27 FE) MG tablet, Take 1 tablet (240 mg total) by mouth in the morning, Disp: 90 tablet, Rfl: 3  •  finasteride (PROSCAR) 5 mg tablet, Take 1 tablet (5 mg total) by mouth daily, Disp: 90 tablet, Rfl: 3  •  latanoprost (XALATAN) 0.005 % ophthalmic solution, Administer 1 drop to both eyes daily at bedtime , Disp: , Rfl:   •  Multiple Vitamin (MULTIVITAMIN) tablet, Take 1 tablet by mouth as needed, Disp: , Rfl:   •  Multiple Vitamins-Minerals (PRESERVISION AREDS PO), Take 1 capsule by mouth 2 (two) times a day, Disp: , Rfl:   •  VITAMIN D PO, Take by mouth as needed, Disp: , Rfl:     No Known Allergies    Social History   Past Surgical History:   Procedure Laterality Date   • ABDOMINAL SURGERY      "intestine surgery"    • BUNIONECTOMY     • COLONOSCOPY     • HERNIA REPAIR  2010   • HERNIA REPAIR  2016   • HERNIA REPAIR     • [facility] W/SESMDC W/DIST METAR OSTEOT Right 10/12/2020    Procedure: BUNIONECTOMY, DOUBLE OSTEOTOMY;  Surgeon: Arnold Diaz DPM;  Location: [address] [place] [address] MAIN OR;  Service: Podiatry   • SD EXCISION TUMOR SOFT TIS BACK/FLANK SUBQ 3 CM/> N/A 11/12/2021    Procedure: EXCISION  BIOPSY LESION/MASS BACK;  Surgeon: Hortensia Choi MD;  Location: [facility] MAIN OR;  Service: General   • SD EXCISION TUMOR SOFT TISSUE SHOULDER SUBQ 3 CM/> Right 11/12/2021    Procedure: EXCISION BIOPSY TISSUE LESION/MASS UPPER EXTREMITY;  Surgeon: Hortensia Choi MD;  Location: [facility] MAIN OR;  Service: General     Family History   Problem Relation Age of Onset   • Hypertension Mother    • Uterine cancer Sister        Objective:  /68 (BP Location: Left arm, Patient Position: Sitting, Cuff Size: Adult)   Pulse 55   Temp 98.2 °F (36.8 °C)   Ht 5' 9" (1.753 m)   Wt 68.5 kg (151 lb)   SpO2 98%   BMI 22.30 kg/m²     Recent Results (from the past 1344 hour(s))   Comprehensive metabolic panel    Collection Time: 08/02/23  8:42 AM   Result Value Ref Range    Sodium 144 135 - 147 mmol/L    Potassium 3.7 3.5 - 5.3 mmol/L    Chloride 114 (H) 96 - 108 mmol/L    CO2 26 21 - 32 mmol/L    ANION GAP 4 mmol/L    BUN 19 5 - 25 mg/dL    Creatinine 1.20 0.60 - 1.30 mg/dL    Glucose, Fasting 88 65 - 99 mg/dL    Calcium 9.0 8.3 - 10.1 mg/dL    AST 13 5 - 45 U/L    ALT 15 12 - 78 U/L    Alkaline Phosphatase 60 46 - 116 U/L    Total Protein 6.8 6.4 - 8.4 g/dL    Albumin 3.7 3.5 - 5.0 g/dL    Total Bilirubin 0.69 0.20 - 1.00 mg/dL    eGFR 59 ml/min/1.73sq m   Lipid panel    Collection Time: 08/02/23  8:42 AM   Result Value Ref Range    Cholesterol 211 (H) See Comment mg/dL    Triglycerides 90 See Comment mg/dL    HDL, Direct 93 >=40 mg/dL    LDL Calculated 100 0 - 100 mg/dL    Non-HDL-Chol (CHOL-HDL) 118 mg/dl   CBC    Collection Time: 08/02/23  8:42 AM   Result Value Ref Range    WBC 5.42 4.31 - 10.16 Thousand/uL    RBC 6.06 (H) 3.88 - 5.62 Million/uL    Hemoglobin 13.0 12.0 - 17.0 g/dL    Hematocrit 43.3 36.5 - 49.3 %    MCV 72 (L) 82 - 98 fL    MCH 21.5 (L) 26.8 - 34.3 pg    MCHC 30.0 (L) 31.4 - 37.4 g/dL    RDW 17.3 (H) 11.6 - 15.1 %    Platelets 010 201 - 597 Thousands/uL    MPV 11.0 8.9 - 12.7 fL            Physical Exam  Vitals and nursing note reviewed. Constitutional:       General: He is not in acute distress. Appearance: He is well-developed. He is not diaphoretic. HENT:      Head: Normocephalic and atraumatic. Eyes:      General: No scleral icterus. Right eye: No discharge. Left eye: No discharge. Conjunctiva/sclera: Conjunctivae normal.      Pupils: Pupils are equal, round, and reactive to light. Neck:      Thyroid: No thyromegaly. Vascular: No JVD. Cardiovascular:      Rate and Rhythm: Normal rate and regular rhythm. Heart sounds: Normal heart sounds. No murmur heard. No friction rub. No gallop. Pulmonary:      Effort: Pulmonary effort is normal. No respiratory distress. Breath sounds: Normal breath sounds. No wheezing or rales. Chest:      Chest wall: No tenderness. Abdominal:      General: Bowel sounds are normal. There is no distension. Palpations: Abdomen is soft. There is no mass. Tenderness: There is no abdominal tenderness. There is no guarding or rebound. Musculoskeletal:         General: No tenderness or deformity. Normal range of motion. Cervical back: Normal range of motion and neck supple. Lymphadenopathy:      Cervical: No cervical adenopathy. Skin:     General: Skin is warm and dry. Coloration: Skin is not pale. Findings: No erythema or rash. Neurological:      Mental Status: He is alert and oriented to person, place, and time. Cranial Nerves: No cranial nerve deficit. Coordination: Coordination normal.      Deep Tendon Reflexes: Reflexes are normal and symmetric. Psychiatric:         Behavior: Behavior normal.         Thought Content:  Thought content normal.         Judgment: Judgment normal.

## 2023-08-10 PROBLEM — N13.8 BPH WITH OBSTRUCTION/LOWER URINARY TRACT SYMPTOMS: Status: ACTIVE | Noted: 2023-08-10

## 2023-08-10 PROBLEM — N40.1 BPH WITH OBSTRUCTION/LOWER URINARY TRACT SYMPTOMS: Status: ACTIVE | Noted: 2023-08-10

## 2023-08-10 RX ORDER — CIPROFLOXACIN 2 MG/ML
400 INJECTION, SOLUTION INTRAVENOUS ONCE
OUTPATIENT
Start: 2023-08-10

## 2023-08-10 RX ORDER — SODIUM CHLORIDE 9 MG/ML
30 INJECTION, SOLUTION INTRAVENOUS CONTINUOUS
OUTPATIENT
Start: 2023-08-10

## 2023-08-10 NOTE — ASSESSMENT & PLAN NOTE
I had a long conversation with the patient over the phone. This is a 72M. He has moderate postvoid residual volume. He has significant lower urinary tract symptoms at baseline with chief complaints of obstructive and irritative voiding symptoms. Per Dr. Layo Hairston evaluation, his lower uterine tract symptoms are likely primarily to bladder outlet obstruction due to a massively enlarged prostate; other superimposed etiologies are also possible. Prostatectomy and prostate artery embolization (PAE) were discussed with him by Dr. Michelle Hutson and he opted to proceed with PAE. We discussed that PAE is an excellent up front option for lower urinary tract symptoms caused by an enlarged prostate. His prostate was reported to be 280g. There have been numerous retrospective studies, a number of randomized trials including a sham trial and meta analyses showing efficacy of PAE in improving subjective lower urinary tract symptoms and quality of life (1,2,4). The duration of benefit is at least up to 1 year if not longer. The safety profile of PAE is excellent with significantly less adverse events and shorter hospital stay. There has been suggestion of approximately 20% chance of requiring additional treatment within 2 years due to the waning treatment effect from PAE or unsatisfied initial outcome (3). We discussed that in case of recurrent or residual symptoms, repeat PAE is technically feasible and PAE does not preclude the patient from having future TURP or any other minimally invasive surgical therapies if he ultimately desires. We discussed some technical details of PAE and what to anticipate on the day of the procedure. I informed him that I typically perform PAE with anesthesia support given the occasional long duration of this procedure. We discussed the theoretical risk of bleeding, infection and nontarget embolization.   It is expected that he will experience some degree of post embolization syndrome including dysuria, urethral burning and pollakiuria for a few days. He is expected to be discharged several hours after the procedure and infrequently required to stay overnight. After our discussion, he is in agreement to proceed with scheduling for PAE.    - PAE order placed  - NPO after midnight before the procedure    Reference:  1) Rodney PAIZ, Perfecto Middleton, Kristy MJ, Drew NT, [address], VARELDON T, Jadiel S, Lorraine J, Donny M, Danae G, Abida N. Efficacy and safety of prostate artery embolization for benign prostatic hyperplasia: an observational study and propensity-matched comparison with transurethral resection of the prostate (the UK-ROPE study). BJU Int. 2018 Aug;122(2):270-282. doi: 10.1111/bju.05722. Epub 2018 May 6. PMID: 69947941. 2) [address] [place] [address], [address]. Randomised Clinical Trial of Prostatic Artery Embolisation Versus a Sham Procedure for Benign Prostatic Hyperplasia. Eur Urol. 2020 Mar;77(3):354-362. doi: 10.1016/j.eururo. 2019.11.010. Epub 2019 Dec 10. PMID: 65626707. 3) Nathanael D, Manfred G, Selvin L, Cory S, Samantha S, Ben HP, Juan L, Ashley DS. Prostatic Artery Embolisation Versus Transurethral Resection of the Prostate for Benign Prostatic Hyperplasia: 2-yr Outcomes of a Randomised, Open-label, Single-centre Trial. Eur Urol. 2021 Jul;80(1):34-42. doi: 10.1016/j.eururo.2021.02.008. Epub 2021 Feb 19. PMID: 23087462. 4) Vel Guajardo, Antonella A, Jett, [address] [place] [address] Systematic Review and [place]-analysis Comparing Prostatic Artery Embolization to Gold-Standard Transurethral Resection of the Prostate for Benign Prostatic Hyperplasia. Cardiovasc Intervent Radiol. 2021 Feb;44(2):183-193. doi: 10.1007/p18710-643-46238-8. Epub 2020 Oct 19. PMID: 61563033.

## 2023-08-16 ENCOUNTER — PATIENT MESSAGE (OUTPATIENT)
Dept: INTERNAL MEDICINE CLINIC | Facility: OTHER | Age: 73
End: 2023-08-16

## 2023-09-25 ENCOUNTER — TELEPHONE (OUTPATIENT)
Dept: RADIOLOGY | Facility: HOSPITAL | Age: 73
End: 2023-09-25

## 2023-09-25 NOTE — PRE-PROCEDURE INSTRUCTIONS
Pre-procedure Instructions for Interventional Radiology  [address] [address] [place]  [phone]    You are scheduled for a/an prostate artery embolization. On Monday 10/2/23. Your tentative arrival time is 7:00 am.  Short stay will notify you the day before your procedure with the exact arrival time and the location to arrive. To prepare for your procedure:  1. Please arrange for someone to drive you home after the procedure and stay with you until the next morning if you are instructed to do so. This is typically for patients receiving some type of sedative or anesthetic for the procedure. 2. DO NOT EAT OR DRINK ANYTHING after midnight on the evening before your procedure including candy & gum.  3. ONLY SIPS OF WATER with your medications are allowed on the morning of your procedure. 4. TAKE ALL OF YOUR REGULAR MEDICATIONS THE MORNING OF YOUR PROCEDURE with sips of water! We may call you to stop some of your blood sugar, blood pressure and blood thinning medications depending on the procedure. Please take all of these medications unless we instruct you to stop them. 5. If you have an allergy to x-ray dye, please contact Interventional Radiology for an x-ray dye preparation which usually consists of an oral steroid and Benadryl. The day of your procedure:  1. Bring a list of the medications you take at home. 2. Bring medications you take for breathing problems (such as inhalers), medications for chest pain, or both. 3. Bring a case for your glasses or contacts. 4. Bring your insurance card and a form of photo ID.  5. Please leave all valuables such as credit cards and jewelry at home. 6. Report to the registration desk in the main lobby [facility]. Ask to be directed to [facility].   7. While your procedure is being performed, your family may wait in the Radiology Waiting Room on the 1st floor in Radiology. if they need to leave, they may provide a number to be called following the procedure. 8. Be prepared to stay overnight just in case. Sometimes procedures will indicate the need for further observation or treatment. 9. If you are scheduled for a follow-up visit with the Interventional Radiologist after your procedure, you will be called with a date and time.     Special Instructions (Medications to stop taking before your procedure etc.):  n/a

## 2023-09-29 ENCOUNTER — TELEPHONE (OUTPATIENT)
Dept: RADIOLOGY | Facility: HOSPITAL | Age: 73
End: 2023-09-29

## 2023-09-29 NOTE — PRE-PROCEDURE INSTRUCTIONS
Pre-procedure Instructions for Interventional Radiology  [place]  [address]  [facility] [phone]    You are scheduled for a/an Prostate Artery Embolization. On Monday 10/2/23. Your arrival time is 0715. Our Interventional Radiology nurse will notify you a few days before your procedure with the exact arrival time and location to arrive. To prepare for your procedure:  1. Please arrange for someone to drive you home after the procedure and stay with you until the next morning if you are instructed to do so. This is typically for patients receiving some type of sedative or anesthetic for the procedure. 2. DO NOT EAT OR DRINK ANYTHING after midnight on the evening before your procedure including candy & gum.  3. ONLY SIPS OF WATER with your medications are allowed on the morning of your procedure. 4. TAKE ALL OF YOUR REGULAR MEDICATIONS THE MORNING OF YOUR PROCEDURE with sips of water! We may call you to stop some of your blood sugar, blood pressure and blood thinning medications depending on the procedure. Please take all of these medications unless we instruct you to stop them. 5. If you have an allergy to x-ray dye, please contact Interventional Radiology for an x-ray dye preparation which usually consists of an oral steroid and Benadryl. The day of your procedure:  1. Bring a list of the medications you take at home. 2. Bring medications you take for breathing problems (such as inhalers), medications for chest pain, or both. 3. Bring your insurance card and a form of photo ID.  4. Bring a case for your glasses or contacts. 5. Please leave all valuables such as credit cards and jewelry at home. 6. Report to the registration desk in the main lobby at the [facility]. Ask to be directed to the After Procedure Unit on the 2nd floor.   7. While your procedure is being performed your family may wait in the Waiting Room on the 2nd floor.  8. Be prepared to stay overnight just in case. Sometimes procedures will indicate the need for further observation or treatment. 9. If you are scheduled for a follow-up visit with the Interventional Radiologist after your procedure, you will be called with a date and time. Special Instructions (Medications to alter or stop taking before your procedure etc.):  Above reviewed with his wife Sonya.

## 2023-09-30 ENCOUNTER — ANESTHESIA (OUTPATIENT)
Dept: ANESTHESIOLOGY | Facility: HOSPITAL | Age: 73
End: 2023-09-30

## 2023-09-30 ENCOUNTER — ANESTHESIA EVENT (OUTPATIENT)
Dept: ANESTHESIOLOGY | Facility: HOSPITAL | Age: 73
End: 2023-09-30

## 2023-09-30 RX ORDER — SODIUM CHLORIDE, SODIUM LACTATE, POTASSIUM CHLORIDE, CALCIUM CHLORIDE 600; 310; 30; 20 MG/100ML; MG/100ML; MG/100ML; MG/100ML
50 INJECTION, SOLUTION INTRAVENOUS CONTINUOUS
Status: CANCELLED | OUTPATIENT
Start: 2023-09-30

## 2023-09-30 NOTE — ANESTHESIA PREPROCEDURE EVALUATION
Procedure:  PRE-OP ONLY    Relevant Problems   /RENAL   (+) BPH with obstruction/lower urinary tract symptoms   (+) Benign prostatic hyperplasia with urinary retention   (+) Stage 3 chronic kidney disease, unspecified whether stage 3a or 3b CKD (HCC)      Other   (+) History of intestinal malabsorption             Anesthesia Plan  ASA Score- 2     Anesthesia Type- general with ASA Monitors. Additional Monitors:     Airway Plan: ETT. Plan Factors-    Chart reviewed. EKG reviewed. Existing labs reviewed. Patient summary reviewed. Induction- intravenous. Postoperative Plan- Plan for postoperative opioid use. Informed Consent- Anesthetic plan and risks discussed with patient. I personally reviewed this patient with the CRNA. Discussed and agreed on the Anesthesia Plan with the CRNA. .                Lab Results   Component Value Date    HGBA1C 5.6 06/25/2021       Lab Results   Component Value Date    K 3.7 08/02/2023     (H) 08/02/2023    CO2 26 08/02/2023    BUN 19 08/02/2023    CREATININE 1.20 08/02/2023    GLUF 88 08/02/2023    CALCIUM 9.0 08/02/2023    AST 13 08/02/2023    ALT 15 08/02/2023    ALKPHOS 60 08/02/2023    EGFR 59 08/02/2023       Lab Results   Component Value Date    WBC 5.42 08/02/2023    HGB 13.0 08/02/2023    HCT 43.3 08/02/2023    MCV 72 (L) 08/02/2023     08/02/2023   NSR Normal ECG

## 2023-10-02 ENCOUNTER — ANESTHESIA EVENT (OUTPATIENT)
Dept: INTERVENTIONAL RADIOLOGY/VASCULAR | Facility: HOSPITAL | Age: 73
End: 2023-10-02

## 2023-10-02 ENCOUNTER — HOSPITAL ENCOUNTER (OUTPATIENT)
Dept: INTERVENTIONAL RADIOLOGY/VASCULAR | Facility: HOSPITAL | Age: 73
Discharge: HOME | End: 2023-10-02
Attending: RADIOLOGY
Payer: COMMERCIAL

## 2023-10-02 ENCOUNTER — ANESTHESIA (OUTPATIENT)
Dept: INTERVENTIONAL RADIOLOGY/VASCULAR | Facility: HOSPITAL | Age: 73
End: 2023-10-02

## 2023-10-02 VITALS
OXYGEN SATURATION: 95 % | BODY MASS INDEX: 20.41 KG/M2 | HEIGHT: 73 IN | RESPIRATION RATE: 18 BRPM | DIASTOLIC BLOOD PRESSURE: 69 MMHG | HEART RATE: 63 BPM | WEIGHT: 154 LBS | TEMPERATURE: 97.1 F | SYSTOLIC BLOOD PRESSURE: 134 MMHG

## 2023-10-02 DIAGNOSIS — N40.1 BPH WITH OBSTRUCTION/LOWER URINARY TRACT SYMPTOMS: Primary | ICD-10-CM

## 2023-10-02 DIAGNOSIS — N13.8 BPH WITH OBSTRUCTION/LOWER URINARY TRACT SYMPTOMS: Primary | ICD-10-CM

## 2023-10-02 DIAGNOSIS — N40.1 BPH WITH OBSTRUCTION/LOWER URINARY TRACT SYMPTOMS: ICD-10-CM

## 2023-10-02 DIAGNOSIS — N13.8 BPH WITH OBSTRUCTION/LOWER URINARY TRACT SYMPTOMS: ICD-10-CM

## 2023-10-02 LAB
ANION GAP SERPL CALCULATED.3IONS-SCNC: 7 MMOL/L
BUN SERPL-MCNC: 20 MG/DL (ref 5–25)
CALCIUM SERPL-MCNC: 9.2 MG/DL (ref 8.4–10.2)
CHLORIDE SERPL-SCNC: 107 MMOL/L (ref 96–108)
CO2 SERPL-SCNC: 27 MMOL/L (ref 21–32)
CREAT SERPL-MCNC: 1.15 MG/DL (ref 0.6–1.3)
ERYTHROCYTE [DISTWIDTH] IN BLOOD BY AUTOMATED COUNT: 16.8 % (ref 11.6–15.1)
GFR SERPL CREATININE-BSD FRML MDRD: 62 ML/MIN/1.73SQ M
GLUCOSE P FAST SERPL-MCNC: 89 MG/DL (ref 65–99)
GLUCOSE SERPL-MCNC: 89 MG/DL (ref 65–140)
HCT VFR BLD AUTO: 43.5 % (ref 36.5–49.3)
HGB BLD-MCNC: 13.5 G/DL (ref 12–17)
INR PPP: 0.95 (ref 0.84–1.19)
MCH RBC QN AUTO: 21.8 PG (ref 26.8–34.3)
MCHC RBC AUTO-ENTMCNC: 31 G/DL (ref 31.4–37.4)
MCV RBC AUTO: 70 FL (ref 82–98)
PLATELET # BLD AUTO: 230 THOUSANDS/UL (ref 149–390)
PMV BLD AUTO: 10.4 FL (ref 8.9–12.7)
POTASSIUM SERPL-SCNC: 3.7 MMOL/L (ref 3.5–5.3)
PROTHROMBIN TIME: 13 SECONDS (ref 11.6–14.5)
RBC # BLD AUTO: 6.2 MILLION/UL (ref 3.88–5.62)
SODIUM SERPL-SCNC: 141 MMOL/L (ref 135–147)
WBC # BLD AUTO: 4.6 THOUSAND/UL (ref 4.31–10.16)

## 2023-10-02 PROCEDURE — C1894 INTRO/SHEATH, NON-LASER: HCPCS

## 2023-10-02 PROCEDURE — 37243 VASC EMBOLIZE/OCCLUDE ORGAN: CPT

## 2023-10-02 PROCEDURE — C1760 CLOSURE DEV, VASC: HCPCS

## 2023-10-02 PROCEDURE — C1769 GUIDE WIRE: HCPCS

## 2023-10-02 PROCEDURE — C1887 CATHETER, GUIDING: HCPCS

## 2023-10-02 PROCEDURE — 85610 PROTHROMBIN TIME: CPT | Performed by: RADIOLOGY

## 2023-10-02 PROCEDURE — 75736 ARTERY X-RAYS PELVIS: CPT

## 2023-10-02 PROCEDURE — 37243 VASC EMBOLIZE/OCCLUDE ORGAN: CPT | Performed by: RADIOLOGY

## 2023-10-02 PROCEDURE — 76937 US GUIDE VASCULAR ACCESS: CPT | Performed by: RADIOLOGY

## 2023-10-02 PROCEDURE — 36247 INS CATH ABD/L-EXT ART 3RD: CPT | Performed by: RADIOLOGY

## 2023-10-02 PROCEDURE — 36248 INS CATH ABD/L-EXT ART ADDL: CPT | Performed by: RADIOLOGY

## 2023-10-02 PROCEDURE — 76937 US GUIDE VASCULAR ACCESS: CPT

## 2023-10-02 PROCEDURE — 85027 COMPLETE CBC AUTOMATED: CPT | Performed by: RADIOLOGY

## 2023-10-02 PROCEDURE — 80048 BASIC METABOLIC PNL TOTAL CA: CPT | Performed by: RADIOLOGY

## 2023-10-02 PROCEDURE — 75625 CONTRAST EXAM ABDOMINL AORTA: CPT

## 2023-10-02 PROCEDURE — 36247 INS CATH ABD/L-EXT ART 3RD: CPT

## 2023-10-02 PROCEDURE — 75774 ARTERY X-RAY EACH VESSEL: CPT

## 2023-10-02 RX ORDER — PHENAZOPYRIDINE HYDROCHLORIDE 100 MG/1
100 TABLET, FILM COATED ORAL 3 TIMES DAILY PRN
Qty: 6 TABLET | Refills: 0 | Status: SHIPPED | OUTPATIENT
Start: 2023-10-02 | End: 2023-10-04

## 2023-10-02 RX ORDER — LIDOCAINE HYDROCHLORIDE 10 MG/ML
INJECTION, SOLUTION EPIDURAL; INFILTRATION; INTRACAUDAL; PERINEURAL AS NEEDED
Status: DISCONTINUED | OUTPATIENT
Start: 2023-10-02 | End: 2023-10-02

## 2023-10-02 RX ORDER — SOLIFENACIN SUCCINATE 5 MG/1
5 TABLET, FILM COATED ORAL DAILY
Qty: 7 TABLET | Refills: 0 | Status: SHIPPED | OUTPATIENT
Start: 2023-10-02 | End: 2023-10-09

## 2023-10-02 RX ORDER — LIDOCAINE HYDROCHLORIDE 10 MG/ML
INJECTION, SOLUTION EPIDURAL; INFILTRATION; INTRACAUDAL; PERINEURAL AS NEEDED
Status: COMPLETED | OUTPATIENT
Start: 2023-10-02 | End: 2023-10-02

## 2023-10-02 RX ORDER — FENTANYL CITRATE 50 UG/ML
INJECTION, SOLUTION INTRAMUSCULAR; INTRAVENOUS AS NEEDED
Status: DISCONTINUED | OUTPATIENT
Start: 2023-10-02 | End: 2023-10-02

## 2023-10-02 RX ORDER — ONDANSETRON 2 MG/ML
4 INJECTION INTRAMUSCULAR; INTRAVENOUS ONCE AS NEEDED
Status: DISCONTINUED | OUTPATIENT
Start: 2023-10-02 | End: 2023-10-06 | Stop reason: HOSPADM

## 2023-10-02 RX ORDER — SODIUM CHLORIDE, SODIUM LACTATE, POTASSIUM CHLORIDE, CALCIUM CHLORIDE 600; 310; 30; 20 MG/100ML; MG/100ML; MG/100ML; MG/100ML
INJECTION, SOLUTION INTRAVENOUS CONTINUOUS PRN
Status: DISCONTINUED | OUTPATIENT
Start: 2023-10-02 | End: 2023-10-02

## 2023-10-02 RX ORDER — FENTANYL CITRATE/PF 50 MCG/ML
25 SYRINGE (ML) INJECTION
Status: DISCONTINUED | OUTPATIENT
Start: 2023-10-02 | End: 2023-10-06 | Stop reason: HOSPADM

## 2023-10-02 RX ORDER — ALBUTEROL SULFATE 2.5 MG/3ML
2.5 SOLUTION RESPIRATORY (INHALATION) ONCE AS NEEDED
Status: DISCONTINUED | OUTPATIENT
Start: 2023-10-02 | End: 2023-10-06 | Stop reason: HOSPADM

## 2023-10-02 RX ORDER — PROPOFOL 10 MG/ML
INJECTION, EMULSION INTRAVENOUS AS NEEDED
Status: DISCONTINUED | OUTPATIENT
Start: 2023-10-02 | End: 2023-10-02

## 2023-10-02 RX ORDER — ONDANSETRON 2 MG/ML
INJECTION INTRAMUSCULAR; INTRAVENOUS AS NEEDED
Status: DISCONTINUED | OUTPATIENT
Start: 2023-10-02 | End: 2023-10-02

## 2023-10-02 RX ORDER — BISACODYL 5 MG/1
5 TABLET, DELAYED RELEASE ORAL DAILY PRN
Qty: 7 TABLET | Refills: 0 | Status: SHIPPED | OUTPATIENT
Start: 2023-10-02 | End: 2023-10-09

## 2023-10-02 RX ORDER — SODIUM CHLORIDE 9 MG/ML
30 INJECTION, SOLUTION INTRAVENOUS CONTINUOUS
Status: DISCONTINUED | OUTPATIENT
Start: 2023-10-02 | End: 2023-10-06 | Stop reason: HOSPADM

## 2023-10-02 RX ORDER — CIPROFLOXACIN 500 MG/1
500 TABLET, FILM COATED ORAL EVERY 12 HOURS SCHEDULED
Qty: 14 TABLET | Refills: 0 | Status: SHIPPED | OUTPATIENT
Start: 2023-10-02 | End: 2023-10-09

## 2023-10-02 RX ORDER — DEXAMETHASONE SODIUM PHOSPHATE 10 MG/ML
INJECTION, SOLUTION INTRAMUSCULAR; INTRAVENOUS AS NEEDED
Status: DISCONTINUED | OUTPATIENT
Start: 2023-10-02 | End: 2023-10-02

## 2023-10-02 RX ORDER — IODIXANOL 320 MG/ML
300 INJECTION, SOLUTION INTRAVASCULAR
Status: COMPLETED | OUTPATIENT
Start: 2023-10-02 | End: 2023-10-02

## 2023-10-02 RX ORDER — SODIUM CHLORIDE, SODIUM LACTATE, POTASSIUM CHLORIDE, CALCIUM CHLORIDE 600; 310; 30; 20 MG/100ML; MG/100ML; MG/100ML; MG/100ML
50 INJECTION, SOLUTION INTRAVENOUS CONTINUOUS
Status: DISCONTINUED | OUTPATIENT
Start: 2023-10-02 | End: 2023-10-06 | Stop reason: HOSPADM

## 2023-10-02 RX ORDER — CIPROFLOXACIN 2 MG/ML
400 INJECTION, SOLUTION INTRAVENOUS ONCE
Status: COMPLETED | OUTPATIENT
Start: 2023-10-02 | End: 2023-10-02

## 2023-10-02 RX ORDER — IBUPROFEN 800 MG/1
800 TABLET ORAL EVERY 8 HOURS PRN
Qty: 21 TABLET | Refills: 0 | Status: SHIPPED | OUTPATIENT
Start: 2023-10-02 | End: 2023-10-09

## 2023-10-02 RX ORDER — EPHEDRINE SULFATE 50 MG/ML
INJECTION INTRAVENOUS AS NEEDED
Status: DISCONTINUED | OUTPATIENT
Start: 2023-10-02 | End: 2023-10-02

## 2023-10-02 RX ORDER — VERAPAMIL HYDROCHLORIDE 2.5 MG/ML
INJECTION, SOLUTION INTRAVENOUS AS NEEDED
Status: COMPLETED | OUTPATIENT
Start: 2023-10-02 | End: 2023-10-02

## 2023-10-02 RX ADMIN — DEXAMETHASONE SODIUM PHOSPHATE 8 MG: 10 INJECTION, SOLUTION INTRAMUSCULAR; INTRAVENOUS at 09:10

## 2023-10-02 RX ADMIN — PHENYLEPHRINE HYDROCHLORIDE 50 MCG: 10 INJECTION INTRAVENOUS at 09:28

## 2023-10-02 RX ADMIN — IODIXANOL 200 ML: 320 INJECTION, SOLUTION INTRAVASCULAR at 15:23

## 2023-10-02 RX ADMIN — Medication 100 MCG: at 12:37

## 2023-10-02 RX ADMIN — SODIUM CHLORIDE, SODIUM LACTATE, POTASSIUM CHLORIDE, AND CALCIUM CHLORIDE: .6; .31; .03; .02 INJECTION, SOLUTION INTRAVENOUS at 11:26

## 2023-10-02 RX ADMIN — EPHEDRINE SULFATE 5 MG: 50 INJECTION, SOLUTION INTRAVENOUS at 09:37

## 2023-10-02 RX ADMIN — Medication 100 MCG: at 12:08

## 2023-10-02 RX ADMIN — PHENYLEPHRINE HYDROCHLORIDE 100 MCG: 10 INJECTION INTRAVENOUS at 11:23

## 2023-10-02 RX ADMIN — FENTANYL CITRATE 25 MCG: 50 INJECTION, SOLUTION INTRAMUSCULAR; INTRAVENOUS at 09:54

## 2023-10-02 RX ADMIN — PROPOFOL 200 MG: 10 INJECTION, EMULSION INTRAVENOUS at 09:04

## 2023-10-02 RX ADMIN — PHENYLEPHRINE HYDROCHLORIDE 100 MCG: 10 INJECTION INTRAVENOUS at 11:41

## 2023-10-02 RX ADMIN — LIDOCAINE HYDROCHLORIDE 50 MG: 10 INJECTION, SOLUTION EPIDURAL; INFILTRATION; INTRACAUDAL at 09:04

## 2023-10-02 RX ADMIN — VERAPAMIL HYDROCHLORIDE 1 MG: 2.5 INJECTION, SOLUTION INTRAVENOUS at 10:37

## 2023-10-02 RX ADMIN — VERAPAMIL HYDROCHLORIDE 1 MG: 2.5 INJECTION, SOLUTION INTRAVENOUS at 12:20

## 2023-10-02 RX ADMIN — LIDOCAINE HYDROCHLORIDE 7 ML: 10 INJECTION, SOLUTION EPIDURAL; INFILTRATION; INTRACAUDAL at 11:47

## 2023-10-02 RX ADMIN — SODIUM CHLORIDE, SODIUM LACTATE, POTASSIUM CHLORIDE, AND CALCIUM CHLORIDE: .6; .31; .03; .02 INJECTION, SOLUTION INTRAVENOUS at 08:48

## 2023-10-02 RX ADMIN — PHENYLEPHRINE HYDROCHLORIDE 100 MCG: 10 INJECTION INTRAVENOUS at 10:41

## 2023-10-02 RX ADMIN — FENTANYL CITRATE 25 MCG: 50 INJECTION, SOLUTION INTRAMUSCULAR; INTRAVENOUS at 10:26

## 2023-10-02 RX ADMIN — EPHEDRINE SULFATE 10 MG: 50 INJECTION, SOLUTION INTRAVENOUS at 12:04

## 2023-10-02 RX ADMIN — LIDOCAINE HYDROCHLORIDE 8 ML: 10 INJECTION, SOLUTION EPIDURAL; INFILTRATION; INTRACAUDAL at 09:31

## 2023-10-02 RX ADMIN — CIPROFLOXACIN: 400 INJECTION, SOLUTION INTRAVENOUS at 08:51

## 2023-10-02 RX ADMIN — FENTANYL CITRATE 25 MCG: 50 INJECTION, SOLUTION INTRAMUSCULAR; INTRAVENOUS at 09:11

## 2023-10-02 RX ADMIN — SODIUM CHLORIDE, SODIUM LACTATE, POTASSIUM CHLORIDE, AND CALCIUM CHLORIDE 50 ML/HR: .6; .31; .03; .02 INJECTION, SOLUTION INTRAVENOUS at 07:54

## 2023-10-02 RX ADMIN — Medication 100 MCG: at 10:36

## 2023-10-02 RX ADMIN — Medication 100 MCG: at 10:45

## 2023-10-02 RX ADMIN — PROPOFOL 50 MG: 10 INJECTION, EMULSION INTRAVENOUS at 10:23

## 2023-10-02 RX ADMIN — FENTANYL CITRATE 25 MCG: 50 INJECTION, SOLUTION INTRAMUSCULAR; INTRAVENOUS at 12:32

## 2023-10-02 RX ADMIN — PHENYLEPHRINE HYDROCHLORIDE 100 MCG: 10 INJECTION INTRAVENOUS at 11:38

## 2023-10-02 RX ADMIN — ONDANSETRON 4 MG: 2 INJECTION INTRAMUSCULAR; INTRAVENOUS at 09:09

## 2023-10-02 NOTE — H&P
Interventional Radiology Preprocedure Note    History/Indication for procedure: Gee Dean is a 75 y.o. male with enlarged prostate and lower urinary tract symptoms. He presents for PAE. Risks and benefits explained to patient and his wife. He offers consent to proceed.     Relevant past medical history:    Past Medical History:   Diagnosis Date   • Anemia     B12 def   • Bunion of right foot 09/17/2020   • Colon polyp    • Constipation    • COVID-19 06/2022   • Depression 5/31/2022   • Glaucoma     suspect   • Mixed hyperlipidemia 01/09/2019   • Moderate mixed hyperlipidemia not requiring statin therapy 1/9/2019   • Pre-syncope 05/31/2022   • Spasm of abdominal muscles of left side 01/29/2020     Patient Active Problem List   Diagnosis   • Benign prostatic hyperplasia with urinary retention   • Inguinal hernia   • History of intestinal malabsorption   • Encounter for colorectal cancer screening   • Vitamin D deficiency   • Low mean corpuscular volume (MCV)   • Family history of thalassemia   • Other constipation   • Lipoma of right upper extremity   • Advanced care planning/counseling discussion   • Stage 3 chronic kidney disease, unspecified whether stage 3a or 3b CKD ([address])   • BPH with obstruction/lower urinary tract symptoms       /80   Pulse 62   Temp (!) 97.1 °F (36.2 °C) (Temporal)   Resp 16   Ht 6' 1" (1.854 m)   Wt 69.9 kg (154 lb)   SpO2 99%   BMI 20.32 kg/m²     Medications:    Inpatient Medications:     Scheduled Medications:  Current Facility-Administered Medications   Medication Dose Route Frequency Provider Last Rate   • ciprofloxacin  400 mg Intravenous Once Smith Tai MD     • lactated ringers  50 mL/hr Intravenous Continuous Maryuri Escobedo MD 50 mL/hr (10/02/23 0754)   • sodium chloride  30 mL/hr Intravenous Continuous Smith Tai MD         Infusions:  lactated ringers, 50 mL/hr, Last Rate: 50 mL/hr (10/02/23 0754)  sodium chloride, 30 mL/hr        PRN:      Outpatient Medications:  Current Outpatient Medications on File Prior to Encounter   Medication Sig Dispense Refill   • Ascorbic Acid (VITAMIN C PO) Take by mouth     • docusate sodium (COLACE) 100 mg capsule Take 100 mg by mouth as needed      • dorzolamide-timolol (COSOPT) 22.3-6.8 MG/ML ophthalmic solution      • ferrous gluconate (FERGON) 240 (27 FE) MG tablet Take 1 tablet (240 mg total) by mouth in the morning 90 tablet 3   • finasteride (PROSCAR) 5 mg tablet Take 1 tablet (5 mg total) by mouth daily 90 tablet 3   • latanoprost (XALATAN) 0.005 % ophthalmic solution Administer 1 drop to both eyes daily at bedtime      • Multiple Vitamin (MULTIVITAMIN) tablet Take 1 tablet by mouth as needed     • Multiple Vitamins-Minerals (PRESERVISION AREDS PO) Take 1 capsule by mouth 2 (two) times a day     • VITAMIN D PO Take by mouth as needed     • Cyanocobalamin (VITAMIN B-12) 2500 MCG SUBL Place 1 tablet under the tongue daily       No current facility-administered medications on file prior to encounter. No Known Allergies    Anticoagulants: none    ASA classification: ASA 2 - Patient with mild systemic disease with no functional limitations    Airway Assessment: II (hard and soft palate, upper portion of tonsils anduvula visible)    Relevant family history: None    Relevant review of systems: None    Prior sedation/anesthesia: yes    Can the patient lie flat? Yes     Does patient have sleep apnea?  No    If yes, does patient use CPAP? not applicable    NPO Status: yes    Labs:   CBC with diff:   Lab Results   Component Value Date    WBC 4.60 10/02/2023    HGB 13.5 10/02/2023    HCT 43.5 10/02/2023    MCV 70 (L) 10/02/2023     10/02/2023    RBC 6.20 (H) 10/02/2023    MCH 21.8 (L) 10/02/2023    MCHC 31.0 (L) 10/02/2023    RDW 16.8 (H) 10/02/2023    MPV 10.4 10/02/2023    NRBC 0 01/19/2023     BMP/CMP:  Lab Results   Component Value Date    K 3.7 10/02/2023     10/02/2023    CO2 27 10/02/2023    BUN 20 10/02/2023    CREATININE 1.15 10/02/2023    CALCIUM 9.2 10/02/2023    AST 13 08/02/2023    ALT 15 08/02/2023    ALKPHOS 60 08/02/2023    EGFR 62 10/02/2023   ,     Coags:   Lab Results   Component Value Date    PTT 28 01/19/2023    INR 0.95 10/02/2023   ,   Results from last 7 days   Lab Units 10/02/23  0740   INR  0.95        Relevant imaging studies:   CT and MRI reviewed. Directed physical examination:  NAD  AAO x3  Normal resp effort  RRR    Assessment/Plan:   PAE    Sedation/Anesthesia plan:  Provided by anesthesia.     Consent with alternatives to the procedure, risks and benefits have been explained and discussed with the patient/patient's family: yes

## 2023-10-02 NOTE — SEDATION DOCUMENTATION
Pt tolerated PAE procedure. Vitals stable. Vascade closure device implanted and pressure held at biltaeral groins. Guaze and tegaderm to site. Pt being transported to APU for recovery. Bedrest flat for 2hrs. Report given to receiving nurse.

## 2023-10-02 NOTE — BRIEF OP NOTE (RAD/CATH)
INTERVENTIONAL RADIOLOGY PROCEDURE NOTE    Date: 10/2/2023    Procedure:   Procedure Summary     Date: 10/02/23 Room / Location: [address] [place] [address] Interventional Radiology    Anesthesia Start: 3521 Anesthesia Stop:     Procedure: IR PROSTATE ARTERY EMBOLIZATION Diagnosis:       BPH with obstruction/lower urinary tract symptoms      (BPH.  LUTS.)    Scheduled Providers: Imani Castro MD; Sarina Mcamnus MD Responsible Provider: Sarina Mcmanus MD    Anesthesia Type: general ASA Status: 2          Preoperative diagnosis:   1. BPH with obstruction/lower urinary tract symptoms         Postoperative diagnosis: Same. Surgeon: Imani Castro MD     Assistant: None. No qualified resident was available. Blood loss: 10 cc    Specimens: none     Findings:   Right prostate artery embo. Unable to select the left prostate artery after a collateral vessel was embolized. Complications: None immediate.     Anesthesia: general

## 2023-10-02 NOTE — ANESTHESIA POSTPROCEDURE EVALUATION
Post-Op Assessment Note    CV Status:  Stable  Pain Score: 0    Pain management: adequate     Mental Status:  Alert and awake   Hydration Status:  Euvolemic   PONV Controlled:  Controlled   Airway Patency:  Patent      Post Op Vitals Reviewed: Yes      Staff: CRNA         No notable events documented.     BP   127/74   Temp 97   Pulse 70   Resp 14   SpO2 98

## 2023-10-02 NOTE — DISCHARGE INSTRUCTIONS
Embolization   AMBULATORY CARE:   What you need to know about embolization: Embolization is a procedure to create a clot, or block, in a blood vessel. This stops blood from flowing to the area. The procedure may be used to treat many conditions. It can help stop heavy bleeding (hemorrhage), or prevent an aneurysm from rupturing. An abnormal connection between arteries can be removed. Embolization can stop blood flow to a tumor, such as a uterine fibroid or a cancer tumor. Chemotherapy medicine may be given during an embolization to treat a cancer tumor. This is called chemoembolization. How to prepare for the procedure: Embolization is sometimes done as an emergency procedure. This means you will not have time to prepare. For an embolization that is not an emergency, the following are general guidelines for how to prepare:  Tell your provider about all your allergies. This includes if you have ever had an allergic reaction to contrast liquid, anesthesia, or antibiotics. You may be told not to eat or drink anything after midnight the night before your procedure. Arrange to have someone drive you home. The person should stay with you to help you and watch for problems that may develop. Give your provider a list of your medicines. Include all medicines and supplements you take. You may need to stop taking blood thinners or aspirin several days before your procedure. This will help decrease your risk for bleeding. Do not stop taking medicines unless your healthcare provider tells you to stop. Your provider will tell you which medicines to take or not take on the day of your procedure. You may need blood tests to check how well your blood clots and to check your kidney function. Depending on the reason for this procedure, you may an MRI, ultrasound, x-ray, or CT scan. These pictures will help your healthcare provider examine the area to be worked on.      If you are a woman, tell your provider if you know or think you might be pregnant. You may not be able to have certain tests because they may harm an unborn baby. Your provider may need to take extra precautions for other tests. What will happen during the procedure: You may be given general anesthesia to keep you asleep and pain-free. You may instead be given moderate sedation. This means you will be awake during the procedure, but you should not feel any pain. Your provider will put numbing medicine on your skin where the procedure will be done. A small incision will be made over an artery. A catheter (thin tube) will be guided into the artery. Contrast liquid will be used to help your healthcare provider see your arteries more easily. Your provider will use a type of x-ray that gives a moving picture of the arteries. This will help him or her move the catheter into the right place. The catheter is moved up until it reaches the correct artery. Your provider will put medicine or a material into the artery to slow or stop blood from flowing. This may be a coil, foam, beads, a plug, or liquid. The liquid may also contain material that is larger than blood cells. Your provider will remove the catheter. Pressure will be used to stop any bleeding that happens. The incision area does not need to be closed with stitches. It will be small and close on its own. It will be covered with a bandage to keep it from becoming infected. What to expect after the procedure: You may have pain for a few days. Depending on the reason you had this procedure, you may also have a headache or cramps. You may have pain, bleeding, or bruising where the catheter went into your leg. All of these symptoms are normal and should get better soon. You may be given pain medicine through your IV or a pump. A pump allows you to control when the pain medicine is given. You should expect to stay in the hospital at least overnight.  If you had this procedure to treat heavy bleeding, it may take 24 hours to know if the bleeding stopped. Healthcare providers will help you walk around after your procedure. This will help prevent blood clots. Do not get up until healthcare providers say it is okay. They may want you to lie in one position for a certain amount of time. When they say it is okay to walk, they will help you stand and walk safely. Risks of embolization: You may bleed more than expected or develop an infection. The area being treated may be damaged during the procedure. Your artery may be damaged from the catheter, or you may develop a blood clot. Your kidneys may be damaged from the contrast liquid. The material being put into the artery may go to the wrong place. This can stop blood flow to healthy tissue. The procedure may not work, or it may not relieve your symptoms. Call your doctor or specialist if:   You have a fever higher than 100.4°F (38°C). You have a fever, pain, and nausea that last longer than 3 days. You suddenly have severe abdominal pain. You cannot urinate, or you urinate very little. You have signs of an infection at the catheter site, such as red streaks, pain, or swelling. You have new or worsening pain. You have questions or concerns about your condition or care. Medicines: You may need any of the following:  NSAIDs help decrease swelling and pain or fever. This medicine is available with or without a doctor's order. NSAIDs can cause stomach bleeding or kidney problems in certain people. If you take blood thinner medicine, always ask your healthcare provider if NSAIDs are safe for you. Always read the medicine label and follow directions. Prescription pain medicine may be given. Ask your healthcare provider how to take this medicine safely. Some prescription pain medicines contain acetaminophen. Do not take other medicines that contain acetaminophen without talking to your healthcare provider.  Too much acetaminophen may cause liver damage. Prescription pain medicine may cause constipation. Ask your healthcare provider how to prevent or treat constipation. Take your medicine as directed. Contact your healthcare provider if you think your medicine is not helping or if you have side effects. Tell him or her if you are allergic to any medicine. Keep a list of the medicines, vitamins, and herbs you take. Include the amounts, and when and why you take them. Bring the list or the pill bottles to follow-up visits. Carry your medicine list with you in case of an emergency. Self-care:   Rest as needed. Rest and sleep will help your body heal.     Follow your healthcare provider's instructions for activity. He or she will tell you when it is okay to return to your normal activities and to start driving. He or she may want you to wait 1 to 2 weeks to return to work. Care for the catheter site as directed. It is okay to shower after the procedure. You will only have a small cut in your skin from where the catheter went into your leg. Check the catheter site for signs of infection, including red streaks, pain, and swelling. Treat symptoms of postembolization syndrome. This syndrome is common after an embolization procedure. It usually starts within 72 hours of the procedure and may last a few days. The main symptoms are fever, pain, and nausea. You will probably be able to manage your symptoms at home. Acetaminophen or an NSAID, such as ibuprofen, can reduce a fever and pain. You may need to eat lightly to manage nausea. Drink more liquids for the first week after the procedure to prevent dehydration. WOUND CARE     Remove band aid/ dressing tomorrow. You may shower 24 hours after your procedure. Shower and wash groin area or wrist area gently with soap and water: beginning tomorrow. Rinse and pat Dry. Apply new water seal band aid. Repeat this process for 5 days.   If there is any drainage from the puncture site, you should put on a clean bandage. No Powders, creams, lotions or antibiotic ointments for 5 days. No tub baths, hot tubs or swimming for 5 days. Follow up with your doctor or specialist as directed: You may need to have more tests to check if the procedure worked. Write down your questions so you remember to ask them during your visits. © Copyright [employer] Information is for End User's use only and may not be sold, redistributed or otherwise used for commercial purposes. All illustrations and images included in CareNotes® are the copyrighted property of [employer] or [address] [place] [address]  The above information is an  only. It is not intended as medical advice for individual conditions or treatments. Talk to your doctor, nurse or pharmacist before following any medical regimen to see if it is safe and effective for you.

## 2023-10-04 DIAGNOSIS — N40.1 BPH WITH OBSTRUCTION/LOWER URINARY TRACT SYMPTOMS: Primary | ICD-10-CM

## 2023-10-04 DIAGNOSIS — N13.8 BPH WITH OBSTRUCTION/LOWER URINARY TRACT SYMPTOMS: Primary | ICD-10-CM

## 2023-10-04 RX ORDER — METHYLPREDNISOLONE 4 MG/1
TABLET ORAL
Qty: 1 EACH | Refills: 0 | Status: SHIPPED | OUTPATIENT
Start: 2023-10-04

## 2023-10-04 NOTE — PROGRESS NOTES
Spoke to pt over the phone. Had bladder spasm and pain with urination. Improved but still significant. Alleviated by NSAID. Will add medrol dose pack. Sent to his pharmacy.

## 2023-10-16 ENCOUNTER — TELEPHONE (OUTPATIENT)
Dept: INTERVENTIONAL RADIOLOGY/VASCULAR | Facility: CLINIC | Age: 73
End: 2023-10-16

## 2023-10-16 NOTE — TELEPHONE ENCOUNTER
IR Clinic Follow-Up:    Patient clinical visit in 1 month. Schedule visit for the first available IR Physician: No                *If no, schedule for:   IR Physician: Brianne Reyes    Type of Visit: Virtual Brief Visit - Telephone    Additional Details: followup after PAE.

## 2023-11-21 ENCOUNTER — TELEMEDICINE (OUTPATIENT)
Dept: INTERVENTIONAL RADIOLOGY/VASCULAR | Facility: CLINIC | Age: 73
End: 2023-11-21
Payer: COMMERCIAL

## 2023-11-21 ENCOUNTER — TELEPHONE (OUTPATIENT)
Dept: INTERVENTIONAL RADIOLOGY/VASCULAR | Facility: CLINIC | Age: 73
End: 2023-11-21

## 2023-11-21 DIAGNOSIS — N13.8 BPH WITH OBSTRUCTION/LOWER URINARY TRACT SYMPTOMS: Primary | ICD-10-CM

## 2023-11-21 DIAGNOSIS — N40.1 BPH WITH OBSTRUCTION/LOWER URINARY TRACT SYMPTOMS: Primary | ICD-10-CM

## 2023-11-21 PROCEDURE — 99441 PR PHYS/QHP TELEPHONE EVALUATION 5-10 MIN: CPT | Performed by: RADIOLOGY

## 2023-11-21 NOTE — PROGRESS NOTES
Virtual Brief Visit    This Visit is being completed by telephone. The Patient is located at Home and in the following state in which I hold an active license [place]    The patient was identified by name and date of birth. Zaheer Garza was informed that this is a telemedicine visit and that the visit is being conducted through Telephone. My office door was closed. No one else was in the room. He acknowledged consent and understanding of privacy and security of the video platform. The patient has agreed to participate and understands they can discontinue the visit at any time. Patient is aware this is a billable service. Assessment/Plan:    Problem List Items Addressed This Visit          Genitourinary    BPH with obstruction/lower urinary tract symptoms - Primary     I had a productive conversation with the patient over the phone. This is a follow-up visit after his prostate artery embolization for lower urinary tract symptoms due to BPH. He had severe postembolization syndrome including dysuria and bladder spasm which lasted for about a week. Those symptoms have completely resolved. He reported that his urinary lower urinary tract symptoms have dramatically improved. No nocturia or urinary frequency. He inquired when he can stop taking finasteride given his dramatic improvement of his symptoms for which I defer to urology service. I gave him a number to make an appointment. I addressed all of his questions and concerns. I will continue to follow-up with him in 6 months. Recent Visits  No visits were found meeting these conditions. Showing recent visits within past 7 days and meeting all other requirements  Today's Visits  Date Type Provider Dept   11/21/23 Telemedicine Johnnie Alaniz MD [facility]   Showing today's visits and meeting all other requirements  Future Appointments  No visits were found meeting these conditions.   Showing future appointments within next 150 days and meeting all other requirements         Visit Time  Total Visit Duration: 10 minutes

## 2023-11-21 NOTE — TELEPHONE ENCOUNTER
IR Clinic Follow-Up:    Patient clinical visit in 6 month.    Schedule visit for the first available IR Physician: No                *If no, schedule for:   IR Physician: Ying    Type of Visit: Virtual Brief Visit - Telephone    Additional Details: s/p PAE 6 month f/u

## 2023-11-21 NOTE — ASSESSMENT & PLAN NOTE
I had a productive conversation with the patient over the phone. This is a follow-up visit after his prostate artery embolization for lower urinary tract symptoms due to BPH. He had severe postembolization syndrome including dysuria and bladder spasm which lasted for about a week. Those symptoms have completely resolved. He reported that his urinary lower urinary tract symptoms have dramatically improved. No nocturia or urinary frequency. He inquired when he can stop taking finasteride given his dramatic improvement of his symptoms for which I defer to urology service. I gave him a number to make an appointment. I addressed all of his questions and concerns. I will continue to follow-up with him in 6 months.

## 2023-11-21 NOTE — LETTER
November 21, 2023     Ave Sandra PA-C  [address]    Patient: Atilio Gallego   YOB: 1950   Date of Visit: 11/21/2023       Dear Dr. Amador Castillo:    Thank you for referring Atilio Gallego to me for evaluation. Below are my notes for this consultation. If you have questions, please do not hesitate to call me. I look forward to following your patient along with you. Sincerely,        Rea Saint, MD        CC: Lili Jones, MD Rea Saint, MD  11/21/2023  4:02 PM  Sign when Signing Visit  Virtual Brief Visit    This Visit is being completed by telephone. The Patient is located at Home and in the following state in which I hold an active license [place]    The patient was identified by name and date of birth. Atilio Gallego was informed that this is a telemedicine visit and that the visit is being conducted through Telephone. My office door was closed. No one else was in the room. He acknowledged consent and understanding of privacy and security of the video platform. The patient has agreed to participate and understands they can discontinue the visit at any time. Patient is aware this is a billable service. Assessment/Plan:    Problem List Items Addressed This Visit          Genitourinary    BPH with obstruction/lower urinary tract symptoms - Primary     I had a productive conversation with the patient over the phone. This is a follow-up visit after his prostate artery embolization for lower urinary tract symptoms due to BPH. He had severe postembolization syndrome including dysuria and bladder spasm which lasted for about a week. Those symptoms have completely resolved. He reported that his urinary lower urinary tract symptoms have dramatically improved. No nocturia or urinary frequency.   He inquired when he can stop taking finasteride given his dramatic improvement of his symptoms for which I defer to urology service. I gave him a number to make an appointment. I addressed all of his questions and concerns. I will continue to follow-up with him in 6 months. Recent Visits  No visits were found meeting these conditions. Showing recent visits within past 7 days and meeting all other requirements  Today's Visits  Date Type Provider Dept   11/21/23 Telemedicine Rea Saint, MD [facility] [place] [facility]   Showing today's visits and meeting all other requirements  Future Appointments  No visits were found meeting these conditions.   Showing future appointments within next 150 days and meeting all other requirements         Visit Time  Total Visit Duration: 10 minutes

## 2023-12-29 ENCOUNTER — OFFICE VISIT (OUTPATIENT)
Dept: URGENT CARE | Age: 73
End: 2023-12-29
Payer: COMMERCIAL

## 2023-12-29 VITALS
SYSTOLIC BLOOD PRESSURE: 136 MMHG | DIASTOLIC BLOOD PRESSURE: 74 MMHG | OXYGEN SATURATION: 96 % | RESPIRATION RATE: 18 BRPM | TEMPERATURE: 97.7 F | HEART RATE: 80 BPM

## 2023-12-29 DIAGNOSIS — U07.1 COVID-19: Primary | ICD-10-CM

## 2023-12-29 DIAGNOSIS — R05.1 ACUTE COUGH: ICD-10-CM

## 2023-12-29 LAB
SARS-COV-2 AG UPPER RESP QL IA: POSITIVE
VALID CONTROL: ABNORMAL

## 2023-12-29 PROCEDURE — 87811 SARS-COV-2 COVID19 W/OPTIC: CPT | Performed by: STUDENT IN AN ORGANIZED HEALTH CARE EDUCATION/TRAINING PROGRAM

## 2023-12-29 PROCEDURE — 99213 OFFICE O/P EST LOW 20 MIN: CPT | Performed by: STUDENT IN AN ORGANIZED HEALTH CARE EDUCATION/TRAINING PROGRAM

## 2023-12-29 RX ORDER — NIRMATRELVIR AND RITONAVIR 300-100 MG
3 KIT ORAL 2 TIMES DAILY
Qty: 30 TABLET | Refills: 0 | Status: SHIPPED | OUTPATIENT
Start: 2023-12-29 | End: 2024-01-03

## 2023-12-30 NOTE — PROGRESS NOTES
[facility]        NAME: Moisés Pettit is a 73 y.o. male  : 1950    MRN: [id number]  DATE: 2023  TIME: 9:46 PM    Assessment and Orders   COVID-19 [U07.1]  1. COVID-19  nirmatrelvir & ritonavir (Paxlovid, 300/100,) tablet therapy pack      2. Acute cough  Poct Covid 19 Rapid Antigen Test            Plan and Discussion      Patient positive for COVID-19.  Patient is at high risk for needing hospitalization for COVID-19 given his age.  will start Paxlovid today.  Lungs are clear to auscultation and vital signs are stable.    Patient tested postive for COVID-19 in office today. Discussed current CDC guidelines. Patient was informed that they must remain at home on self isolation for 5 days since symptom onset and continue to wear a mask around others for an additional 5 days after that. Patient was informed that they must be fever free for at least 24 hours without medications prior to discontinuing isolation. Patient has been instructed to rest at home, drink plenty of fluids, take Tylenol or Motrin as needed, drink warm tea w/ lemon and honey, run a humidifier at home, and take a multivitamin daily. If symptoms persist despite treatment, patient is to follow up with PCP for re-check. If symptoms worsen,or any new symptoms present including but not limited to, fever, chest pain, shortness of breath, patient is to be seen in the ER. Patient verbalizes understanding and agrees w/ treatment plan.        Discussed ED precautions including (but not limited to)  Difficultly breathing or shortness of breath  Chest pain  Acutely worsening symptoms.     Risks and benefits discussed. Patient understands and agrees with the plan.     Follow up with PCP.     Chief Complaint     Chief Complaint   Patient presents with    Cough     Cough and nasal congestion it's been for 3 days.         History of Present Illness       Cough        Patient is a 73-year-old male who presents with 3 days of cough and  congestion.  He states he has bodyaches but denies fevers.  He denies chest pain or shortness of breath.  Denies sore throat and ear pain.  Has been taking over-the-counter cold medication with some relief.    Review of Systems   Review of Systems   Respiratory:  Positive for cough.      As stated above    Current Medications       Current Outpatient Medications:     nirmatrelvir & ritonavir (Paxlovid, 300/100,) tablet therapy pack, Take 3 tablets by mouth 2 (two) times a day for 5 days Take 2 nirmatrelvir tablets + 1 ritonavir tablet together per dose, Disp: 30 tablet, Rfl: 0    Ascorbic Acid (VITAMIN C PO), Take by mouth, Disp: , Rfl:     bisacodyl (DULCOLAX) 5 mg EC tablet, Take 1 tablet (5 mg total) by mouth daily as needed for constipation for up to 7 days, Disp: 7 tablet, Rfl: 0    Cyanocobalamin (VITAMIN B-12) 2500 MCG SUBL, Place 1 tablet under the tongue daily, Disp: , Rfl:     docusate sodium (COLACE) 100 mg capsule, Take 100 mg by mouth as needed , Disp: , Rfl:     dorzolamide-timolol (COSOPT) 22.3-6.8 MG/ML ophthalmic solution, , Disp: , Rfl:     ferrous gluconate (FERGON) 240 (27 FE) MG tablet, Take 1 tablet (240 mg total) by mouth in the morning, Disp: 90 tablet, Rfl: 3    finasteride (PROSCAR) 5 mg tablet, Take 1 tablet (5 mg total) by mouth daily, Disp: 90 tablet, Rfl: 3    ibuprofen (MOTRIN) 800 mg tablet, Take 1 tablet (800 mg total) by mouth every 8 (eight) hours as needed for moderate pain or fever for up to 7 days, Disp: 21 tablet, Rfl: 0    latanoprost (XALATAN) 0.005 % ophthalmic solution, Administer 1 drop to both eyes daily at bedtime , Disp: , Rfl:     methylPREDNISolone 4 MG tablet therapy pack, Use as directed on package, Disp: 1 each, Rfl: 0    Multiple Vitamin (MULTIVITAMIN) tablet, Take 1 tablet by mouth as needed, Disp: , Rfl:     Multiple Vitamins-Minerals (PRESERVISION AREDS PO), Take 1 capsule by mouth 2 (two) times a day, Disp: , Rfl:     solifenacin (VESICARE) 5 mg tablet, Take 1  "tablet (5 mg total) by mouth daily for 7 days, Disp: 7 tablet, Rfl: 0    VITAMIN D PO, Take by mouth as needed, Disp: , Rfl:     Current Allergies     Allergies as of 12/29/2023    (No Known Allergies)            The following portions of the patient's history were reviewed and updated as appropriate: allergies, current medications, past family history, past medical history, past social history, past surgical history and problem list.     Past Medical History:   Diagnosis Date    Anemia     B12 def    Bunion of right foot 09/17/2020    Colon polyp     Constipation     COVID-19 06/2022    Depression 5/31/2022    Glaucoma     suspect    Mixed hyperlipidemia 01/09/2019    Moderate mixed hyperlipidemia not requiring statin therapy 1/9/2019    Pre-syncope 05/31/2022    Spasm of abdominal muscles of left side 01/29/2020       Past Surgical History:   Procedure Laterality Date    ABDOMINAL SURGERY      \"intestine surgery\"     BUNIONECTOMY      COLONOSCOPY      HERNIA REPAIR  2010    HERNIA REPAIR  2016    HERNIA REPAIR      IR PROSTATE ARTERY EMBOLIZATION  10/2/2023    NC CORRJ HALLUX VALGUS W/SESMDC W/DIST METAR OSTEOT Right 10/12/2020    Procedure: BUNIONECTOMY, DOUBLE OSTEOTOMY;  Surgeon: Troy Nobles DPM;  Location:  MAIN OR;  Service: Podiatry    NC EXCISION TUMOR SOFT TIS BACK/FLANK SUBQ 3 CM/> N/A 11/12/2021    Procedure: EXCISION  BIOPSY LESION/MASS BACK;  Surgeon: Fahad Barroso MD;  [facility];  Service: General    NC EXCISION TUMOR SOFT TISSUE SHOULDER SUBQ 3 CM/> Right 11/12/2021    Procedure: EXCISION BIOPSY TISSUE LESION/MASS UPPER EXTREMITY;  Surgeon: Fahad Barroso MD;  [facility];  Service: General       Family History   Problem Relation Age of Onset    Hypertension Mother     Uterine cancer Sister          Medications have been verified.        Objective   /74   Pulse 80   Temp 97.7 °F (36.5 °C) (Temporal)   Resp 18   SpO2 96%   No LMP for male patient.     "   Physical Exam     Physical Exam  Constitutional:       General: He is not in acute distress.     Appearance: He is not ill-appearing.   Cardiovascular:      Rate and Rhythm: Normal rate and regular rhythm.   Pulmonary:      Effort: Pulmonary effort is normal. No respiratory distress.      Breath sounds: No wheezing or rhonchi.   Neurological:      General: No focal deficit present.      Mental Status: He is alert and oriented to person, place, and time.   Psychiatric:         Mood and Affect: Mood normal.         Behavior: Behavior normal.               Shahana Huitron DO

## 2024-01-22 ENCOUNTER — OFFICE VISIT (OUTPATIENT)
Dept: UROLOGY | Facility: AMBULATORY SURGERY CENTER | Age: 74
End: 2024-01-22
Payer: COMMERCIAL

## 2024-01-22 VITALS
RESPIRATION RATE: 18 BRPM | OXYGEN SATURATION: 98 % | HEIGHT: 73 IN | SYSTOLIC BLOOD PRESSURE: 118 MMHG | BODY MASS INDEX: 20.41 KG/M2 | WEIGHT: 154 LBS | DIASTOLIC BLOOD PRESSURE: 78 MMHG | HEART RATE: 78 BPM

## 2024-01-22 DIAGNOSIS — N52.9 ERECTILE DYSFUNCTION, UNSPECIFIED ERECTILE DYSFUNCTION TYPE: Primary | ICD-10-CM

## 2024-01-22 DIAGNOSIS — Z12.5 PROSTATE CANCER SCREENING: ICD-10-CM

## 2024-01-22 PROCEDURE — 99213 OFFICE O/P EST LOW 20 MIN: CPT

## 2024-01-22 RX ORDER — TADALAFIL 5 MG/1
5 TABLET ORAL DAILY PRN
Qty: 30 TABLET | Refills: 1 | Status: SHIPPED | OUTPATIENT
Start: 2024-01-22

## 2024-01-22 NOTE — PROGRESS NOTES
Office Visit- Urology  Moisés Pettit 1950 MRN: [id number]      Assessment/Discussion/Plan    73 y.o. male managed by     BPH with urinary tract symptoms  -S/p prostate artery embolization in October 2023  -Significant improvement in urinary symptoms since PAE  -Patient on finasteride 5 mg.  Will trial discontinuing finasteride      2.  Erectile dysfunction  -Trial coming off of the finasteride  -Addition of Cialis 5 mg as needed.  Discussed administration and side effects.  Patient is aware he cannot utilize any nitroglycerin with use of this medication  -He will call the office with need for refill versus alternative medication depending on his response to it      3.  Prostate cancer screening/elevated PSA  -S/p negative biopsy in 2018  -Multiparametric MRI of the prostate in February 2023 returned PI-RADS 2 with prostamegaly at 245 g  -Last PSA is 3.0 (6.0 when corrected for finasteride)in January 2023.  - YULIA today with enlarged prostate but no nodules appreciated  -Will get repeat PSA in the next 1 to 3 weeks      Chief Complaint:   Moisés is a 73 y.o. male presenting to the office for a follow up visit regarding BPH with urinary tract symptoms        Subjective    Hx 1/30/2023    Moisés is a 72-year-old male with a past urologic history significant for an elevated PSA with his PSA measuring at 5.1 and 5.2 in 2019, negative prostate biopsy in 2018, known history of very large prostate.  He was last seen in the office in January 2022.  At last visit his PSA was noted to be 2.7 while his prostate on digital rectal examination was noted to be very enlarged.  He was initiated on finasteride in 2019.  Per prior documentation in the notes his PSA has ranged from range is 2.6-9.03.  See PSA trend below. Since initiation of finasteride, PSA has been below 4.0 and remained relatively stable.  Patient was previously on an alpha 1 blocker but on documentation stated was not able to tolerate it well.  Any questions  today about prior therapies for BPH he states that he was prescribed Flomax but he never took it.  At baseline patient has frequency, urgency, nocturia x1-2 that is worse only when patient increases his water intake.  He does endorse some straining to urinate, hesitancy, intermittency at baseline.      On 1/19/2023, the patient presented to the emergency room for evaluation of right testicular pain and episode of gross hematuria.  A urine culture demonstrated> 100,000 CFU/milliliter of E. Coli. An ultrasound of the scrotum and testes was obtained and demonstrated hyperemia in the right epididymis with mildly complex bilateral hydroceles.  A CT renal protocol was also obtained during the emergency room which demonstrated 2 simple cyst in the right kidney.  Prostamegaly was also noted with radiology measuring the dimensions of his prostate as 8.6 x 7.6 x 8.1 cm in AP, transverse and longitudinal dimensions. No complex cyst, renal nodules/masses, nephrolithiasis or other reasons for gross hematuria noted.  Patient was diagnosed with right-sided epididymitis and given a dose of ceftriaxone.  He was sent home with a 10-day course of Levaquin and was told to follow-up with urology.  Today at his appointment, he notes that his orchalgia has resolved.  He also notes that his swelling has significantly reduced.       Hx 1/22/24     Patient presents for follow-up today.  He had a cystoscopy with Dr. Escobedo in April 2022 with demonstration of significant bilateral lobar hypertrophy with a large median lobe with no bladder lesions concerning for malignancy.  He was counseled that prostate artery embolization by IR or simple prostatectomy would be available to him if you like to pursue treatment for BPH with urinary tract symptoms.  He eventually decided to proceed with MAVIS PARDO and he underwent prostate artery embolization with IR in October 2023.  Since then patient states that he has had significant improvement urinary tract  "symptoms.  He is currently happy with his urinary symptoms.  He denies any dysuria, gross hematuria, flank pain.  He feels like he has an easier time emptying his bladder and he has a stronger stream with decrease in frequency/urgency    ROS:   Review of Systems   Constitutional: Negative.  Negative for chills, fatigue and fever.   HENT: Negative.     Respiratory:  Negative for shortness of breath.    Cardiovascular:  Negative for chest pain.   Gastrointestinal: Negative.  Negative for abdominal pain.   Endocrine: Negative.    Musculoskeletal: Negative.    Skin: Negative.    Neurological: Negative.  Negative for dizziness and light-headedness.   Hematological: Negative.    Psychiatric/Behavioral: Negative.           Past Medical History  Past Medical History:   Diagnosis Date    Anemia     B12 def    Bunion of right foot 09/17/2020    Colon polyp     Constipation     COVID-19 06/2022    Depression 5/31/2022    Glaucoma     suspect    Mixed hyperlipidemia 01/09/2019    Moderate mixed hyperlipidemia not requiring statin therapy 1/9/2019    Pre-syncope 05/31/2022    Spasm of abdominal muscles of left side 01/29/2020       Past Surgical History  Past Surgical History:   Procedure Laterality Date    ABDOMINAL SURGERY      \"intestine surgery\"     BUNIONECTOMY      COLONOSCOPY      HERNIA REPAIR  2010    HERNIA REPAIR  2016    HERNIA REPAIR      IR PROSTATE ARTERY EMBOLIZATION  10/2/2023    OR CORRJ HLX VLGS BNCTY SESMDC DSTL METAR OSTEOT Right 10/12/2020    Procedure: BUNIONECTOMY, DOUBLE OSTEOTOMY;  Surgeon: Troy Nobles DPM;  Location:  MAIN OR;  Service: Podiatry    OR EXCISION TUMOR SOFT TIS BACK/FLANK SUBQ 3 CM/> N/A 11/12/2021    Procedure: EXCISION  BIOPSY LESION/MASS BACK;  Surgeon: Fahad Barroso MD;  [facility];  Service: General    OR EXCISION TUMOR SOFT TISSUE SHOULDER SUBQ 3 CM/> Right 11/12/2021    Procedure: EXCISION BIOPSY TISSUE LESION/MASS UPPER EXTREMITY;  Surgeon: Fahad Barroso MD;  " Location: [facility];  Service: General       Past Family History  Family History   Problem Relation Age of Onset    Hypertension Mother     Uterine cancer Sister        Past Social history  Social History     Socioeconomic History    Marital status: /Civil Union     Spouse name: Not on file    Number of children: Not on file    Years of education: Not on file    Highest education level: Not on file   Occupational History    Not on file   Tobacco Use    Smoking status: Never    Smokeless tobacco: Never   Vaping Use    Vaping status: Never Used   Substance and Sexual Activity    Alcohol use: Not Currently    Drug use: Never    Sexual activity: Yes     Partners: Female   Other Topics Concern    Not on file   Social History Narrative    Not on file     Social Determinants of Health     Financial Resource Strain: Low Risk  (2/8/2023)    Overall Financial Resource Strain (CARDIA)     Difficulty of Paying Living Expenses: Not very hard   Food Insecurity: Not on file   Transportation Needs: No Transportation Needs (2/8/2023)    PRAPARE - Transportation     Lack of Transportation (Medical): No     Lack of Transportation (Non-Medical): No   Physical Activity: Not on file   Stress: Not on file   Social Connections: Not on file   Intimate Partner Violence: Not on file   Housing Stability: Not on file       Current Medications  Current Outpatient Medications   Medication Sig Dispense Refill    Ascorbic Acid (VITAMIN C PO) Take by mouth      Cyanocobalamin (VITAMIN B-12) 2500 MCG SUBL Place 1 tablet under the tongue daily      docusate sodium (COLACE) 100 mg capsule Take 100 mg by mouth as needed       dorzolamide-timolol (COSOPT) 22.3-6.8 MG/ML ophthalmic solution       ferrous gluconate (FERGON) 240 (27 FE) MG tablet Take 1 tablet (240 mg total) by mouth in the morning 90 tablet 3    finasteride (PROSCAR) 5 mg tablet Take 1 tablet (5 mg total) by mouth daily 90 tablet 3    latanoprost (XALATAN) 0.005 % ophthalmic  "solution Administer 1 drop to both eyes daily at bedtime       methylPREDNISolone 4 MG tablet therapy pack Use as directed on package 1 each 0    Multiple Vitamin (MULTIVITAMIN) tablet Take 1 tablet by mouth as needed      Multiple Vitamins-Minerals (PRESERVISION AREDS PO) Take 1 capsule by mouth 2 (two) times a day      VITAMIN D PO Take by mouth as needed      bisacodyl (DULCOLAX) 5 mg EC tablet Take 1 tablet (5 mg total) by mouth daily as needed for constipation for up to 7 days 7 tablet 0    ibuprofen (MOTRIN) 800 mg tablet Take 1 tablet (800 mg total) by mouth every 8 (eight) hours as needed for moderate pain or fever for up to 7 days 21 tablet 0    solifenacin (VESICARE) 5 mg tablet Take 1 tablet (5 mg total) by mouth daily for 7 days 7 tablet 0     No current facility-administered medications for this visit.       Allergies  No Known Allergies    OBJECTIVE    Vitals   Vitals:    01/22/24 1412   BP: 118/78   BP Location: Left arm   Patient Position: Sitting   Cuff Size: Standard   Pulse: 78   Resp: 18   SpO2: 98%   Weight: 69.9 kg (154 lb)   Height: 6' 1\" (1.854 m)       PVR:    Physical Exam  Constitutional:       General: He is not in acute distress.     Appearance: Normal appearance. He is normal weight. He is not ill-appearing or toxic-appearing.   HENT:      Head: Normocephalic and atraumatic.   Eyes:      Conjunctiva/sclera: Conjunctivae normal.   Cardiovascular:      Rate and Rhythm: Normal rate.   Pulmonary:      Effort: Pulmonary effort is normal. No respiratory distress.   Genitourinary:     Comments: On prostate exam there is evidence of a very enlarged prostate but no nodules appreciated  Skin:     General: Skin is warm and dry.   Neurological:      General: No focal deficit present.      Mental Status: He is alert and oriented to person, place, and time.      Cranial Nerves: No cranial nerve deficit.   Psychiatric:         Mood and Affect: Mood normal.         Behavior: Behavior normal.         " Thought Content: Thought content normal.          Labs:     Lab Results   Component Value Date    PSA 3.0 01/06/2023    PSA 2.7 12/31/2021    PSA 3.0 12/15/2020    PSA 2.9 10/02/2020     Lab Results   Component Value Date    CREATININE 1.15 10/02/2023      Lab Results   Component Value Date    HGBA1C 5.6 06/25/2021     Lab Results   Component Value Date    CALCIUM 9.2 10/02/2023    K 3.7 10/02/2023    CO2 27 10/02/2023     10/02/2023    BUN 20 10/02/2023    CREATININE 1.15 10/02/2023       I have personally reviewed all pertinent lab results and reviewed with patient    Imaging       Cheikh Ibarra PA-C  Date: 1/22/2024 Time: 2:23 PM  [facility]    This note was written using fluency dictation software. Please excuse any resulting minor grammatical errors.

## 2024-01-24 ENCOUNTER — TELEPHONE (OUTPATIENT)
Dept: UROLOGY | Facility: AMBULATORY SURGERY CENTER | Age: 74
End: 2024-01-24

## 2024-01-24 NOTE — TELEPHONE ENCOUNTER
Prior auth started by [facility]       Key: [id number]   1 Principal Discharge DX:	Back contusion  Secondary Diagnosis:	Contusion of right hip, initial encounter

## 2024-01-30 ENCOUNTER — OFFICE VISIT (OUTPATIENT)
Dept: INTERNAL MEDICINE CLINIC | Facility: OTHER | Age: 74
End: 2024-01-30
Payer: COMMERCIAL

## 2024-01-30 VITALS
OXYGEN SATURATION: 97 % | HEART RATE: 88 BPM | WEIGHT: 154 LBS | TEMPERATURE: 97.5 F | HEIGHT: 71 IN | DIASTOLIC BLOOD PRESSURE: 72 MMHG | SYSTOLIC BLOOD PRESSURE: 116 MMHG | BODY MASS INDEX: 21.56 KG/M2

## 2024-01-30 DIAGNOSIS — R05.3 POST-COVID-19 SYNDROME MANIFESTING AS CHRONIC COUGH: Primary | ICD-10-CM

## 2024-01-30 DIAGNOSIS — U09.9 POST-COVID-19 SYNDROME MANIFESTING AS CHRONIC COUGH: Primary | ICD-10-CM

## 2024-01-30 DIAGNOSIS — N18.30 STAGE 3 CHRONIC KIDNEY DISEASE, UNSPECIFIED WHETHER STAGE 3A OR 3B CKD (HCC): ICD-10-CM

## 2024-01-30 PROCEDURE — 99213 OFFICE O/P EST LOW 20 MIN: CPT | Performed by: INTERNAL MEDICINE

## 2024-01-30 RX ORDER — BENZONATATE 100 MG/1
100 CAPSULE ORAL 3 TIMES DAILY PRN
Qty: 20 CAPSULE | Refills: 0 | Status: SHIPPED | OUTPATIENT
Start: 2024-01-30 | End: 2024-02-09

## 2024-01-30 NOTE — PROGRESS NOTES
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   Recommended he contact our office for worsening symptoms.        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  -     benzonatate (TESSALON PERLES) 100 mg capsule; Take 1 capsule (100 mg total) by mouth 3 (three) times a day as needed for cough    Stage 3 chronic kidney disease, unspecified whether stage 3a or 3b CKD (HCC)                  Subjective:      Patient ID: Moisés Pettit is a 73 y.o. male.    Chief Complaint   Patient presents with   • Cough     On going since positive for covid , 29th of dec       73 year old male is seen today with concern for persistent nonproductive cough since he had COVID at the end of December.   He has been trying OTC medications without improvement.     Cough  This is a new problem. The current episode started more than 1 month ago. The problem has been unchanged. The cough is Non-productive. Pertinent negatives include no chest pain, chills, fever, headaches, postnasal drip, rhinorrhea, sore throat, shortness of breath or wheezing.       The following portions of the patient's history were reviewed and updated as appropriate: allergies, current medications, past family history, past medical history, past social history, past surgical history, and problem list.    Review of Systems   Constitutional:  Negative for activity change, appetite change, chills, diaphoresis, fatigue and fever.   HENT:  Negative for congestion, postnasal drip, rhinorrhea, sinus pressure, sinus pain, sneezing and sore throat.    Eyes:  Negative for visual disturbance.   Respiratory:  Positive for cough. Negative for apnea, choking, chest tightness, shortness of breath and  wheezing.    Cardiovascular:  Negative for chest pain, palpitations and leg swelling.   Gastrointestinal:  Negative for abdominal distention, abdominal pain, anal bleeding, blood in stool, constipation, diarrhea, nausea and vomiting.   Endocrine: Negative for cold intolerance and heat intolerance.   Genitourinary:  Negative for difficulty urinating, dysuria and hematuria.   Musculoskeletal: Negative.    Skin: Negative.    Neurological:  Negative for dizziness, weakness, light-headedness, numbness and headaches.   Hematological:  Negative for adenopathy.   Psychiatric/Behavioral:  Negative for agitation, sleep disturbance and suicidal ideas.    All other systems reviewed and are negative.        Past Medical History:   Diagnosis Date   • Anemia     B12 def   • Bunion of right foot 09/17/2020   • Colon polyp    • Constipation    • COVID-19 06/2022   • Depression 5/31/2022   • Glaucoma     suspect   • Mixed hyperlipidemia 01/09/2019   • Moderate mixed hyperlipidemia not requiring statin therapy 1/9/2019   • Pre-syncope 05/31/2022   • Spasm of abdominal muscles of left side 01/29/2020         Current Outpatient Medications:   •  Ascorbic Acid (VITAMIN C PO), Take by mouth, Disp: , Rfl:   •  benzonatate (TESSALON PERLES) 100 mg capsule, Take 1 capsule (100 mg total) by mouth 3 (three) times a day as needed for cough, Disp: 20 capsule, Rfl: 0  •  bisacodyl (DULCOLAX) 5 mg EC tablet, Take 1 tablet (5 mg total) by mouth daily as needed for constipation for up to 7 days, Disp: 7 tablet, Rfl: 0  •  Budeson-Glycopyrrol-Formoterol 160-9-4.8 MCG/ACT AERO, Inhale 2 puffs 2 (two) times a day Rinse mouth after use., Disp: 10.7 g, Rfl: 0  •  Cyanocobalamin (VITAMIN B-12) 2500 MCG SUBL, Place 1 tablet under the tongue daily, Disp: , Rfl:   •  docusate sodium (COLACE) 100 mg capsule, Take 100 mg by mouth as needed , Disp: , Rfl:   •  dorzolamide-timolol (COSOPT) 22.3-6.8 MG/ML ophthalmic solution, , Disp: , Rfl:   •  ferrous gluconate  "(FERGON) 240 (27 FE) MG tablet, Take 1 tablet (240 mg total) by mouth in the morning, Disp: 90 tablet, Rfl: 3  •  ibuprofen (MOTRIN) 800 mg tablet, Take 1 tablet (800 mg total) by mouth every 8 (eight) hours as needed for moderate pain or fever for up to 7 days, Disp: 21 tablet, Rfl: 0  •  latanoprost (XALATAN) 0.005 % ophthalmic solution, Administer 1 drop to both eyes daily at bedtime , Disp: , Rfl:   •  Multiple Vitamin (MULTIVITAMIN) tablet, Take 1 tablet by mouth as needed, Disp: , Rfl:   •  Multiple Vitamins-Minerals (PRESERVISION AREDS PO), Take 1 capsule by mouth 2 (two) times a day, Disp: , Rfl:   •  solifenacin (VESICARE) 5 mg tablet, Take 1 tablet (5 mg total) by mouth daily for 7 days, Disp: 7 tablet, Rfl: 0  •  tadalafil (CIALIS) 5 MG tablet, Take 1 tablet (5 mg total) by mouth daily as needed for erectile dysfunction, Disp: 30 tablet, Rfl: 1  •  VITAMIN D PO, Take by mouth as needed, Disp: , Rfl:     No Known Allergies    Social History   Past Surgical History:   Procedure Laterality Date   • ABDOMINAL SURGERY      \"intestine surgery\"    • BUNIONECTOMY     • COLONOSCOPY     • HERNIA REPAIR  2010   • HERNIA REPAIR  2016   • HERNIA REPAIR     • IR PROSTATE ARTERY EMBOLIZATION  10/2/2023   • RI CORRJ HLX VLGS BNCTY SESMDC DSTL METAR OSTEOT Right 10/12/2020    Procedure: BUNIONECTOMY, DOUBLE OSTEOTOMY;  Surgeon: Troy Nobles DPM;  Location: [facility];  Service: Podiatry   • RI EXCISION TUMOR SOFT TIS BACK/FLANK SUBQ 3 CM/> N/A 11/12/2021    Procedure: EXCISION  BIOPSY LESION/MASS BACK;  Surgeon: Fahad Barroso MD;  Location: [facility];  Service: General   • RI EXCISION TUMOR SOFT TISSUE SHOULDER SUBQ 3 CM/> Right 11/12/2021    Procedure: EXCISION BIOPSY TISSUE LESION/MASS UPPER EXTREMITY;  Surgeon: Fahad Barroso MD;  Location: [facility];  Service: General     Family History   Problem Relation Age of Onset   • Hypertension Mother    • Uterine cancer Sister        Objective:  /72 (BP " "Location: Left arm, Patient Position: Sitting, Cuff Size: Standard)   Pulse 88   Temp 97.5 °F (36.4 °C) (Temporal)   Ht 5' 11\" (1.803 m)   Wt 69.9 kg (154 lb)   SpO2 97%   BMI 21.48 kg/m²     Recent Results (from the past 1344 hour(s))   Poct Covid 19 Rapid Antigen Test    Collection Time: 12/29/23  8:50 PM   Result Value Ref Range    POCT SARS-CoV-2 Ag Positive (A) Negative    VALID CONTROL Valid             Physical Exam  Vitals and nursing note reviewed.   Constitutional:       General: He is not in acute distress.     Appearance: He is well-developed. He is not diaphoretic.   HENT:      Head: Normocephalic and atraumatic.   Eyes:      General: No scleral icterus.        Right eye: No discharge.         Left eye: No discharge.      Conjunctiva/sclera: Conjunctivae normal.      Pupils: Pupils are equal, round, and reactive to light.   Neck:      Thyroid: No thyromegaly.      Vascular: No JVD.   Cardiovascular:      Rate and Rhythm: Normal rate and regular rhythm.      Heart sounds: Normal heart sounds. No murmur heard.     No friction rub. No gallop.   Pulmonary:      Effort: Pulmonary effort is normal. No respiratory distress.      Breath sounds: Normal breath sounds. No wheezing or rales.   Chest:      Chest wall: No tenderness.   Abdominal:      General: Bowel sounds are normal. There is no distension.      Palpations: Abdomen is soft. There is no mass.      Tenderness: There is no abdominal tenderness. There is no guarding or rebound.   Musculoskeletal:         General: No tenderness or deformity. Normal range of motion.      Cervical back: Normal range of motion and neck supple.   Lymphadenopathy:      Cervical: No cervical adenopathy.   Skin:     General: Skin is warm and dry.      Coloration: Skin is not pale.      Findings: No erythema or rash.   Neurological:      Mental Status: He is alert and oriented to person, place, and time.      Cranial Nerves: No cranial nerve deficit.      Coordination: " Coordination normal.      Deep Tendon Reflexes: Reflexes are normal and symmetric.   Psychiatric:         Behavior: Behavior normal.         Thought Content: Thought content normal.         Judgment: Judgment normal.

## 2024-01-30 NOTE — ASSESSMENT & PLAN NOTE
Will prescribe Breztri and benzonatate for cough, defer on antibiotics or systemic steroids.   Recommended he contact our office for worsening symptoms.

## 2024-02-09 ENCOUNTER — OFFICE VISIT (OUTPATIENT)
Dept: INTERNAL MEDICINE CLINIC | Facility: OTHER | Age: 74
End: 2024-02-09
Payer: COMMERCIAL

## 2024-02-09 VITALS
HEART RATE: 55 BPM | SYSTOLIC BLOOD PRESSURE: 106 MMHG | BODY MASS INDEX: 20.04 KG/M2 | HEIGHT: 73 IN | WEIGHT: 151.2 LBS | DIASTOLIC BLOOD PRESSURE: 64 MMHG | TEMPERATURE: 98.6 F | RESPIRATION RATE: 18 BRPM | OXYGEN SATURATION: 99 %

## 2024-02-09 DIAGNOSIS — Z12.12 SCREENING FOR COLORECTAL CANCER: ICD-10-CM

## 2024-02-09 DIAGNOSIS — K59.09 OTHER CONSTIPATION: ICD-10-CM

## 2024-02-09 DIAGNOSIS — Z13.220 ENCOUNTER FOR LIPID SCREENING FOR CARDIOVASCULAR DISEASE: ICD-10-CM

## 2024-02-09 DIAGNOSIS — Z00.00 MEDICARE ANNUAL WELLNESS VISIT, SUBSEQUENT: ICD-10-CM

## 2024-02-09 DIAGNOSIS — Z13.6 SCREENING FOR CARDIOVASCULAR CONDITION: ICD-10-CM

## 2024-02-09 DIAGNOSIS — Z12.11 SCREENING FOR COLORECTAL CANCER: ICD-10-CM

## 2024-02-09 DIAGNOSIS — Z12.5 SCREENING FOR PROSTATE CANCER: ICD-10-CM

## 2024-02-09 DIAGNOSIS — Z13.6 ENCOUNTER FOR LIPID SCREENING FOR CARDIOVASCULAR DISEASE: ICD-10-CM

## 2024-02-09 DIAGNOSIS — N40.1 BPH WITH OBSTRUCTION/LOWER URINARY TRACT SYMPTOMS: ICD-10-CM

## 2024-02-09 DIAGNOSIS — E55.9 VITAMIN D DEFICIENCY: ICD-10-CM

## 2024-02-09 DIAGNOSIS — Z13.1 SCREENING FOR DIABETES MELLITUS: ICD-10-CM

## 2024-02-09 DIAGNOSIS — N13.8 BPH WITH OBSTRUCTION/LOWER URINARY TRACT SYMPTOMS: ICD-10-CM

## 2024-02-09 DIAGNOSIS — N18.30 STAGE 3 CHRONIC KIDNEY DISEASE, UNSPECIFIED WHETHER STAGE 3A OR 3B CKD (HCC): Primary | ICD-10-CM

## 2024-02-09 DIAGNOSIS — Z71.89 ADVANCED CARE PLANNING/COUNSELING DISCUSSION: ICD-10-CM

## 2024-02-09 PROBLEM — U09.9 POST-COVID-19 SYNDROME MANIFESTING AS CHRONIC COUGH: Status: RESOLVED | Noted: 2024-01-30 | Resolved: 2024-02-09

## 2024-02-09 PROBLEM — R33.8 BENIGN PROSTATIC HYPERPLASIA WITH URINARY RETENTION: Status: RESOLVED | Noted: 2018-09-25 | Resolved: 2024-02-09

## 2024-02-09 PROBLEM — R05.3 POST-COVID-19 SYNDROME MANIFESTING AS CHRONIC COUGH: Status: RESOLVED | Noted: 2024-01-30 | Resolved: 2024-02-09

## 2024-02-09 PROCEDURE — 99214 OFFICE O/P EST MOD 30 MIN: CPT | Performed by: INTERNAL MEDICINE

## 2024-02-09 PROCEDURE — G0439 PPPS, SUBSEQ VISIT: HCPCS | Performed by: INTERNAL MEDICINE

## 2024-02-09 PROCEDURE — G0444 DEPRESSION SCREEN ANNUAL: HCPCS | Performed by: INTERNAL MEDICINE

## 2024-02-09 NOTE — PROGRESS NOTES
Bone kidney assessment and Plan:     Problem List Items Addressed This Visit        Genitourinary    Stage 3 chronic kidney disease, unspecified whether stage 3a or 3b CKD (HCC) - Primary     Lab Results   Component Value Date    EGFR 62 10/02/2023    EGFR 59 08/02/2023    EGFR 80 01/19/2023    CREATININE 1.15 10/02/2023    CREATININE 1.20 08/02/2023    CREATININE 0.94 01/19/2023   Continue to trend kidney function.          Relevant Orders    CBC    Comprehensive metabolic panel    BPH with obstruction/lower urinary tract symptoms     Continue follow-up with urology.  Trend PSA.            Other    Vitamin D deficiency     Continue vitamin supplementation.         Other constipation     Continue as needed bowel regimen.         Advanced care planning/counseling discussion     Moisés Robertsome and I had a thorough discussion regarding advance care planning.  he does not have a Living Will.  ACP documentation provided to patient today.  he  understands that today's visit is a billable service.  Refer to ACP note.          Other Visit Diagnoses     Medicare annual wellness visit, subsequent        Encounter for lipid screening for cardiovascular disease        Relevant Orders    Lipid panel    Screening for diabetes mellitus        Screening for cardiovascular condition        Screening for colorectal cancer        Screening for prostate cancer            Serious Illness Conversation    1. What is your understanding now of where you are with your illness?  Prognostic Understanding: appropriate understanding of prognosis     2. How much information about what is likely to be ahead with your illness would you like to have?  Information: patient wants to be fully informed     3. What did you (clinician) communicate to the patient?  Prognostic Communication: Uncertain - It can be difficult to predict what will happen with your illness. I hope you will continue to live well for a long time but I’m worried that you could get  sick quickly, and I think it is important to prepare for that possibility.     4. If your health situation worsens, what are your most important goals?  Goals: have my medical decisions respected, be mentally aware, be at home, be emotionally at peace, be spiritually and emotionally at peace, not be a burden, be physically comfortable     5. What are the biggest fears and worries about the future and your health?  Fears/Worries: loss of control, being a financial burden, being a physical burden, burdening others     6. What abilities are so critical to your life that you cannot imagine living without them?  Unacceptable Function: being unconscious, not being myself, being unable to interact with others, being in pain or very uncomfortable, being chronically confused or not being myself, being in chronic severe pain, being unable to communicate effectively, being unable to talk, not being able to care for myself, including toileting and feeding     7. What gives you strength as you think about the future with your illness?     8. If you become sicker, how much are you willing to go through for the possibility of gaining more time?  Be in the hospital: Yes Have a feeding tube: Yes   Be in the ICU: Yes Live in a nursing home: Yes   Be on a ventilator: No Be uncomfortable: Yes   Be on dialysis: Yes Undergo aggressive test and/or procedures: Yes   9. How much does your proxy and family know about your priorities and wishes?  Discussion Discussion: extensive discussion with family about goals and wishes        How does this plan sound to you? I will do everything I can to help you through this.  Patient verbalized understanding of the plan     I have spent 16 minutes speaking with my patient on advanced care planning today or during this visit     Advanced directives  Five Wishes: Patient does not have Five Wishes- would like information           Depression Screening and Follow-up Plan: Patient was screened for  depression during today's encounter. They screened negative with a PHQ-2 score of 0.    Preventive health issues were discussed with patient, and age appropriate screening tests were ordered as noted in patient's After Visit Summary.  Personalized health advice and appropriate referrals for health education or preventive services given if needed, as noted in patient's After Visit Summary.     History of Present Illness:     Patient presents for a Medicare Wellness Visit    Moisés Pettit is seen today for follow up of chronic conditions.   Recent laboratory studies reviewed today with the patient.   he has been compliant with his medication regimen.   He reports the cough has been improving since starting steroid inhaler.   He follows up with his urologist regularly. He is no  longer on finasteride or vesicare.   he has no complaints or concerns at this time.       Cough  Pertinent negatives include no chest pain, chills, fever, headaches, postnasal drip, rhinorrhea, sore throat, shortness of breath or wheezing.      Patient Care Team:  Benedict Garcia MD as PCP - General (Internal Medicine)  Benedict Garcia MD as PCP - [facility]CP-[facility] ([facility])     Review of Systems:     Review of Systems   Constitutional:  Negative for activity change, appetite change, chills, diaphoresis, fatigue and fever.   HENT:  Negative for congestion, postnasal drip, rhinorrhea, sinus pressure, sinus pain, sneezing and sore throat.    Eyes:  Negative for visual disturbance.   Respiratory:  Positive for cough. Negative for apnea, choking, chest tightness, shortness of breath and wheezing.    Cardiovascular:  Negative for chest pain, palpitations and leg swelling.   Gastrointestinal:  Negative for abdominal distention, abdominal pain, anal bleeding, blood in stool, constipation, diarrhea, nausea and vomiting.   Endocrine: Negative for cold intolerance and heat intolerance.   Genitourinary:  Negative for difficulty urinating, dysuria and  "hematuria.   Musculoskeletal: Negative.    Skin: Negative.    Neurological:  Negative for dizziness, weakness, light-headedness, numbness and headaches.   Hematological:  Negative for adenopathy.   Psychiatric/Behavioral:  Negative for agitation, sleep disturbance and suicidal ideas.    All other systems reviewed and are negative.       Problem List:     Patient Active Problem List   Diagnosis   • Inguinal hernia   • History of intestinal malabsorption   • Vitamin D deficiency   • Low mean corpuscular volume (MCV)   • Family history of thalassemia   • Other constipation   • Lipoma of right upper extremity   • Advanced care planning/counseling discussion   • Stage 3 chronic kidney disease, unspecified whether stage 3a or 3b CKD (HCC)   • BPH with obstruction/lower urinary tract symptoms      Past Medical and Surgical History:     Past Medical History:   Diagnosis Date   • Anemia     B12 def   • Bunion of right foot 09/17/2020   • Colon polyp    • Constipation    • COVID-19 06/2022   • COVID-19 12/29/2023   • Depression 05/31/2022   • Glaucoma     suspect   • Mixed hyperlipidemia 01/09/2019   • Moderate mixed hyperlipidemia not requiring statin therapy 01/09/2019   • Pre-syncope 05/31/2022   • Spasm of abdominal muscles of left side 01/29/2020     Past Surgical History:   Procedure Laterality Date   • ABDOMINAL SURGERY      \"intestine surgery\"    • BUNIONECTOMY     • COLONOSCOPY     • HERNIA REPAIR  2010   • HERNIA REPAIR  2016   • HERNIA REPAIR     • IR PROSTATE ARTERY EMBOLIZATION  10/2/2023   • OK CORRJ HLX VLGS BNCTY SESMDC DSTL METAR OSTEOT Right 10/12/2020    Procedure: BUNIONECTOMY, DOUBLE OSTEOTOMY;  Surgeon: Troy Nobles DPM;  Location:  MAIN OR;  Service: Podiatry   • OK EXCISION TUMOR SOFT TIS BACK/FLANK SUBQ 3 CM/> N/A 11/12/2021    Procedure: EXCISION  BIOPSY LESION/MASS BACK;  Surgeon: Fahad Barroso MD;  Location: [facility] MAIN OR;  Service: General   • OK EXCISION TUMOR SOFT TISSUE SHOULDER SUBQ 3 " CM/> Right 11/12/2021    Procedure: EXCISION BIOPSY TISSUE LESION/MASS UPPER EXTREMITY;  Surgeon: Fahad Barroso MD;  [facility] MA[facility];  Service: General      Family History:     Family History   Problem Relation Age of Onset   • Hypertension Mother    • Uterine cancer Sister       Social History:     Social History     Socioeconomic History   • Marital status: /Civil Union     Spouse name: None   • Number of children: None   • Years of education: None   • Highest education level: None   Occupational History   • None   Tobacco Use   • Smoking status: Never   • Smokeless tobacco: Never   Vaping Use   • Vaping status: Never Used   Substance and Sexual Activity   • Alcohol use: Not Currently   • Drug use: Never   • Sexual activity: Yes     Partners: Female   Other Topics Concern   • None   Social History Narrative   • None     Social Determinants of Health     Financial Resource Strain: Low Risk  (2/9/2024)    Overall Financial Resource Strain (CARDIA)    • Difficulty of Paying Living Expenses: Not very hard   Food Insecurity: Not on file   Transportation Needs: No Transportation Needs (2/9/2024)    PRAPARE - Transportation    • Lack of Transportation (Medical): No    • Lack of Transportation (Non-Medical): No   Physical Activity: Not on file   Stress: Not on file   Social Connections: Not on file   Intimate Partner Violence: Not on file   Housing Stability: Not on file      Medications and Allergies:     Current Outpatient Medications   Medication Sig Dispense Refill   • Ascorbic Acid (VITAMIN C PO) Take by mouth     • bisacodyl (DULCOLAX) 5 mg EC tablet Take 1 tablet (5 mg total) by mouth daily as needed for constipation for up to 7 days 7 tablet 0   • Cyanocobalamin (VITAMIN B-12) 2500 MCG SUBL Place 1 tablet under the tongue daily     • docusate sodium (COLACE) 100 mg capsule Take 100 mg by mouth as needed      • dorzolamide-timolol (COSOPT) 22.3-6.8 MG/ML ophthalmic solution      • ferrous gluconate  (FERGON) 240 (27 FE) MG tablet Take 1 tablet (240 mg total) by mouth in the morning 90 tablet 3   • ibuprofen (MOTRIN) 800 mg tablet Take 1 tablet (800 mg total) by mouth every 8 (eight) hours as needed for moderate pain or fever for up to 7 days 21 tablet 0   • latanoprost (XALATAN) 0.005 % ophthalmic solution Administer 1 drop to both eyes daily at bedtime      • Multiple Vitamin (MULTIVITAMIN) tablet Take 1 tablet by mouth as needed     • Multiple Vitamins-Minerals (PRESERVISION AREDS PO) Take 1 capsule by mouth 2 (two) times a day     • tadalafil (CIALIS) 5 MG tablet Take 1 tablet (5 mg total) by mouth daily as needed for erectile dysfunction 30 tablet 1   • VITAMIN D PO Take by mouth as needed       No current facility-administered medications for this visit.     No Known Allergies   Immunizations:     Immunization History   Administered Date(s) Administered   • COVID-19 PFIZER VACCINE 0.3 ML IM 03/26/2021, 03/26/2021, 04/19/2021, 04/19/2021, 12/03/2021   • COVID-19 Pfizer Vac BIVALENT Mehrdad-sucrose 12 Yr+ IM 11/03/2022, 11/03/2022, 08/01/2023   • INFLUENZA 11/01/2015, 11/29/2017, 10/03/2018, 10/03/2018, 10/13/2021, 10/13/2021, 10/06/2022, 10/06/2022, 10/18/2023   • Influenza Injectable, MDCK, Preservative Free, Quadrivalent, 0.5 mL 11/29/2017   • Influenza Split High Dose Preservative Free IM 09/19/2019   • Influenza, high dose seasonal 0.7 mL 11/09/2020   • Pneumococcal Conjugate 13-Valent 01/09/2019, 01/09/2019   • Pneumococcal Polysaccharide PPV23 12/23/2020, 12/23/2020   • Tdap 08/01/2023, 08/01/2023   • Zoster Vaccine Recombinant 08/12/2019, 02/12/2020      Health Maintenance:         Topic Date Due   • Hepatitis C Screening  Completed   • Colorectal Cancer Screening  Discontinued         Topic Date Due   • COVID-19 Vaccine (9 - 2023-24 season) 09/26/2023      Medicare Screening Tests and Risk Assessments:     Moisés is here for his Subsequent Wellness visit. Last Medicare Wellness visit information  reviewed, patient interviewed and updates made to the record today.      Health Risk Assessment:   Patient rates overall health as very good. Patient feels that their physical health rating is much better. Patient is very satisfied with their life. Eyesight was rated as same. Hearing was rated as same. Patient feels that their emotional and mental health rating is much better. Patients states they are sometimes angry. Patient states they are sometimes unusually tired/fatigued. Pain experienced in the last 7 days has been some. Patient's pain rating has been 2/10. Patient states that he has experienced no weight loss or gain in last 6 months.     Depression Screening:   PHQ-2 Score: 0      Fall Risk Screening:   In the past year, patient has experienced: no history of falling in past year      Home Safety:  Patient does not have trouble with stairs inside or outside of their home. Patient has working smoke alarms and has working carbon monoxide detector. Home safety hazards include: none.     Nutrition:   Current diet is Regular.     Medications:   Patient is currently taking over-the-counter supplements. OTC medications include: see medication list. Patient is able to manage medications.     Activities of Daily Living (ADLs)/Instrumental Activities of Daily Living (IADLs):   Walk and transfer into and out of bed and chair?: Yes  Dress and groom yourself?: Yes    Bathe or shower yourself?: Yes    Feed yourself? Yes  Do your laundry/housekeeping?: Yes  Manage your money, pay your bills and track your expenses?: Yes  Make your own meals?: Yes    Do your own shopping?: Yes    Previous Hospitalizations:   Any hospitalizations or ED visits within the last 12 months?: Yes    How many hospitalizations have you had in the last year?: 1-2    Advance Care Planning:   Living will: Yes    Durable POA for healthcare: Yes    Advanced directive: Yes    Advanced directive counseling given: Yes    End of Life Decisions reviewed with  patient: Yes    Provider agrees with end of life decisions: Yes      Cognitive Screening:   Provider or family/friend/caregiver concerned regarding cognition?: No    PREVENTIVE SCREENINGS      Cardiovascular Screening:    General: Screening Not Indicated, History Lipid Disorder, Risks and Benefits Discussed and Screening Current    Due for: Lipid Panel      Diabetes Screening:     General: Screening Current and Risks and Benefits Discussed      Colorectal Cancer Screening:     General: Screening Current      Prostate Cancer Screening:    General: Risks and Benefits Discussed    Due for: PSA      Osteoporosis Screening:    General: Screening Not Indicated      Abdominal Aortic Aneurysm (AAA) Screening:    Risk factors include: age between 65-76 yo        General: Screening Not Indicated      Lung Cancer Screening:     General: Screening Not Indicated      Hepatitis C Screening:    General: Screening Current and Risks and Benefits Discussed    Screening, Brief Intervention, and Referral to Treatment (SBIRT)    Screening  Typical number of drinks in a day: 0  Typical number of drinks in a week: 0  Interpretation: Low risk drinking behavior.    Single Item Drug Screening:  How often have you used an illegal drug (including marijuana) or a prescription medication for non-medical reasons in the past year? never    Single Item Drug Screen Score: 0  Interpretation: Negative screen for possible drug use disorder    Brief Intervention  Alcohol & drug use screenings were reviewed. No concerns regarding substance use disorder identified.     Annual Depression Screening  Time spent screening and evaluating the patient for depression during today's encounter was 5 minutes.    Other Counseling Topics:   Car/seat belt/driving safety, skin self-exam, sunscreen and calcium and vitamin D intake and regular weightbearing exercise.     No results found.     Physical Exam:     /64 (BP Location: Left arm, Patient Position: Sitting,  "Cuff Size: Standard)   Pulse 55   Temp 98.6 °F (37 °C) (Temporal)   Resp 18   Ht 6' 1\" (1.854 m)   Wt 68.6 kg (151 lb 3.2 oz)   SpO2 99%   BMI 19.95 kg/m²     Physical Exam  Vitals and nursing note reviewed.   Constitutional:       General: He is not in acute distress.     Appearance: Normal appearance. He is normal weight. He is not ill-appearing, toxic-appearing or diaphoretic.   HENT:      Head: Normocephalic and atraumatic.      Right Ear: Tympanic membrane, ear canal and external ear normal. There is no impacted cerumen.      Left Ear: Tympanic membrane, ear canal and external ear normal. There is no impacted cerumen.      Nose: Nose normal. No congestion or rhinorrhea.      Mouth/Throat:      Mouth: Mucous membranes are moist.      Pharynx: Oropharynx is clear. No oropharyngeal exudate or posterior oropharyngeal erythema.   Eyes:      General: No scleral icterus.        Right eye: No discharge.         Left eye: No discharge.      Extraocular Movements: Extraocular movements intact.      Conjunctiva/sclera: Conjunctivae normal.      Pupils: Pupils are equal, round, and reactive to light.   Neck:      Vascular: No carotid bruit.   Cardiovascular:      Rate and Rhythm: Normal rate and regular rhythm.      Pulses: Normal pulses.      Heart sounds: Normal heart sounds. No murmur heard.     No friction rub. No gallop.   Pulmonary:      Effort: Pulmonary effort is normal. No respiratory distress.      Breath sounds: Normal breath sounds. No wheezing or rales.   Abdominal:      General: Abdomen is flat. Bowel sounds are normal. There is no distension.      Palpations: Abdomen is soft. There is no mass.      Tenderness: There is no abdominal tenderness. There is no guarding.      Hernia: No hernia is present.   Musculoskeletal:         General: No swelling, tenderness, deformity or signs of injury. Normal range of motion.      Cervical back: Normal range of motion and neck supple. No rigidity. No muscular " tenderness.      Right lower leg: No edema.      Left lower leg: No edema.   Lymphadenopathy:      Cervical: No cervical adenopathy.   Skin:     General: Skin is warm and dry.      Capillary Refill: Capillary refill takes less than 2 seconds.      Coloration: Skin is not jaundiced or pale.      Findings: No bruising, erythema, lesion or rash.   Neurological:      General: No focal deficit present.      Mental Status: He is alert and oriented to person, place, and time. Mental status is at baseline.      Cranial Nerves: No cranial nerve deficit.      Sensory: No sensory deficit.      Motor: No weakness.      Coordination: Coordination normal.      Gait: Gait normal.      Deep Tendon Reflexes: Reflexes normal.   Psychiatric:         Mood and Affect: Mood normal.         Behavior: Behavior normal.         Thought Content: Thought content normal.         Judgment: Judgment normal.          Benedict Garcia MD

## 2024-02-09 NOTE — ASSESSMENT & PLAN NOTE
Moisés Pettit and I had a thorough discussion regarding advance care planning.  he does not have a Living Will.  ACP documentation provided to patient today.  he  understands that today's visit is a billable service.  Refer to ACP note.

## 2024-02-09 NOTE — ASSESSMENT & PLAN NOTE
Lab Results   Component Value Date    EGFR 62 10/02/2023    EGFR 59 08/02/2023    EGFR 80 01/19/2023    CREATININE 1.15 10/02/2023    CREATININE 1.20 08/02/2023    CREATININE 0.94 01/19/2023   Continue to trend kidney function.

## 2024-02-09 NOTE — PATIENT INSTRUCTIONS
Medicare Preventive Visit Patient Instructions  Thank you for completing your Welcome to Medicare Visit or Medicare Annual Wellness Visit today. Your next wellness visit will be due in one year (2/9/2025).  The screening/preventive services that you may require over the next 5-10 years are detailed below. Some tests may not apply to you based off risk factors and/or age. Screening tests ordered at today's visit but not completed yet may show as past due. Also, please note that scanned in results may not display below.  Preventive Screenings:  Service Recommendations Previous Testing/Comments   Colorectal Cancer Screening  Colonoscopy    Fecal Occult Blood Test (FOBT)/Fecal Immunochemical Test (FIT)  Fecal DNA/Cologuard Test  Flexible Sigmoidoscopy Age: 45-75 years old   Colonoscopy: every 10 years (May be performed more frequently if at higher risk)  OR  FOBT/FIT: every 1 year  OR  Cologuard: every 3 years  OR  Sigmoidoscopy: every 5 years  Screening may be recommended earlier than age 45 if at higher risk for colorectal cancer. Also, an individualized decision between you and your healthcare provider will decide whether screening between the ages of 76-85 would be appropriate. Colonoscopy: 01/09/2023  FOBT/FIT: Not on file  Cologuard: Not on file  Sigmoidoscopy: Not on file    Screening Current     Prostate Cancer Screening Individualized decision between patient and health care provider in men between ages of 55-69   Medicare will cover every 12 months beginning on the day after your 50th birthday PSA: 3.0 ng/mL           Hepatitis C Screening Once for adults born between 1945 and 1965  More frequently in patients at high risk for Hepatitis C Hep C Antibody: 03/30/2016    Screening Current   Diabetes Screening 1-2 times per year if you're at risk for diabetes or have pre-diabetes Fasting glucose: 89 mg/dL (10/2/2023)  A1C: 5.6 % (6/25/2021)  Screening Current   Cholesterol Screening Once every 5 years if you don't  have a lipid disorder. May order more often based on risk factors. Lipid panel: 08/02/2023  Screening Not Indicated  History Lipid Disorder      Other Preventive Screenings Covered by Medicare:  Abdominal Aortic Aneurysm (AAA) Screening: covered once if your at risk. You're considered to be at risk if you have a family history of AAA or a male between the age of 65-75 who smoking at least 100 cigarettes in your lifetime.  Lung Cancer Screening: covers low dose CT scan once per year if you meet all of the following conditions: (1) Age 55-77; (2) No signs or symptoms of lung cancer; (3) Current smoker or have quit smoking within the last 15 years; (4) You have a tobacco smoking history of at least 20 pack years (packs per day x number of years you smoked); (5) You get a written order from a healthcare provider.  Glaucoma Screening: covered annually if you're considered high risk: (1) You have diabetes OR (2) Family history of glaucoma OR (3)  aged 50 and older OR (4)  American aged 65 and older  Osteoporosis Screening: covered every 2 years if you meet one of the following conditions: (1) Have a vertebral abnormality; (2) On glucocorticoid therapy for more than 3 months; (3) Have primary hyperparathyroidism; (4) On osteoporosis medications and need to assess response to drug therapy.  HIV Screening: covered annually if you're between the age of 15-65. Also covered annually if you are younger than 15 and older than 65 with risk factors for HIV infection. For pregnant patients, it is covered up to 3 times per pregnancy.    Immunizations:  Immunization Recommendations   Influenza Vaccine Annual influenza vaccination during flu season is recommended for all persons aged >= 6 months who do not have contraindications   Pneumococcal Vaccine   * Pneumococcal conjugate vaccine = PCV13 (Prevnar 13), PCV15 (Vaxneuvance), PCV20 (Prevnar 20)  * Pneumococcal polysaccharide vaccine = PPSV23 (Pneumovax) Adults  19-63 yo with certain risk factors or if 65+ yo  If never received any pneumonia vaccine: recommend Prevnar 20 (PCV20)  Give PCV20 if previously received 1 dose of PCV13 or PPSV23   Hepatitis B Vaccine 3 dose series if at intermediate or high risk (ex: diabetes, end stage renal disease, liver disease)   Respiratory syncytial virus (RSV) Vaccine - COVERED BY MEDICARE PART D  * RSVPreF3 (Arexvy) CDC recommends that adults 60 years of age and older may receive a single dose of RSV vaccine using shared clinical decision-making (SCDM)   Tetanus (Td) Vaccine - COST NOT COVERED BY MEDICARE PART B Following completion of primary series, a booster dose should be given every 10 years to maintain immunity against tetanus. Td may also be given as tetanus wound prophylaxis.   Tdap Vaccine - COST NOT COVERED BY MEDICARE PART B Recommended at least once for all adults. For pregnant patients, recommended with each pregnancy.   Shingles Vaccine (Shingrix) - COST NOT COVERED BY MEDICARE PART B  2 shot series recommended in those 19 years and older who have or will have weakened immune systems or those 50 years and older     Health Maintenance Due:      Topic Date Due   • Hepatitis C Screening  Completed   • Colorectal Cancer Screening  Discontinued     Immunizations Due:      Topic Date Due   • COVID-19 Vaccine (9 - 2023-24 season) 09/26/2023     Advance Directives   What are advance directives?  Advance directives are legal documents that state your wishes and plans for medical care. These plans are made ahead of time in case you lose your ability to make decisions for yourself. Advance directives can apply to any medical decision, such as the treatments you want, and if you want to donate organs.   What are the types of advance directives?  There are many types of advance directives, and each state has rules about how to use them. You may choose a combination of any of the following:  Living will:  This is a written record of the  treatment you want. You can also choose which treatments you do not want, which to limit, and which to stop at a certain time. This includes surgery, medicine, IV fluid, and tube feedings.   Durable power of  for healthcare (DPAHC):  This is a written record that states who you want to make healthcare choices for you when you are unable to make them for yourself. This person, called a proxy, is usually a family member or a friend. You may choose more than 1 proxy.  Do not resuscitate (DNR) order:  A DNR order is used in case your heart stops beating or you stop breathing. It is a request not to have certain forms of treatment, such as CPR. A DNR order may be included in other types of advance directives.  Medical directive:  This covers the care that you want if you are in a coma, near death, or unable to make decisions for yourself. You can list the treatments you want for each condition. Treatment may include pain medicine, surgery, blood transfusions, dialysis, IV or tube feedings, and a ventilator (breathing machine).  Values history:  This document has questions about your views, beliefs, and how you feel and think about life. This information can help others choose the care that you would choose.  Why are advance directives important?  An advance directive helps you control your care. Although spoken wishes may be used, it is better to have your wishes written down. Spoken wishes can be misunderstood, or not followed. Treatments may be given even if you do not want them. An advance directive may make it easier for your family to make difficult choices about your care.       © Copyright [employer] 2018 Information is for End User's use only and may not be sold, redistributed or otherwise used for commercial purposes. All illustrations and images included in CareNotes® are the copyrighted property of [employer] or [employer]    Medicare Preventive Visit Patient Instructions  Thank you for  completing your Welcome to Medicare Visit or Medicare Annual Wellness Visit today. Your next wellness visit will be due in one year (2/9/2025).  The screening/preventive services that you may require over the next 5-10 years are detailed below. Some tests may not apply to you based off risk factors and/or age. Screening tests ordered at today's visit but not completed yet may show as past due. Also, please note that scanned in results may not display below.  Preventive Screenings:  Service Recommendations Previous Testing/Comments   Colorectal Cancer Screening  Colonoscopy    Fecal Occult Blood Test (FOBT)/Fecal Immunochemical Test (FIT)  Fecal DNA/Cologuard Test  Flexible Sigmoidoscopy Age: 45-75 years old   Colonoscopy: every 10 years (May be performed more frequently if at higher risk)  OR  FOBT/FIT: every 1 year  OR  Cologuard: every 3 years  OR  Sigmoidoscopy: every 5 years  Screening may be recommended earlier than age 45 if at higher risk for colorectal cancer. Also, an individualized decision between you and your healthcare provider will decide whether screening between the ages of 76-85 would be appropriate. Colonoscopy: 01/09/2023  FOBT/FIT: Not on file  Cologuard: Not on file  Sigmoidoscopy: Not on file    Screening Current     Prostate Cancer Screening Individualized decision between patient and health care provider in men between ages of 55-69   Medicare will cover every 12 months beginning on the day after your 50th birthday PSA: 3.0 ng/mL           Hepatitis C Screening Once for adults born between 1945 and 1965  More frequently in patients at high risk for Hepatitis C Hep C Antibody: 03/30/2016    Screening Current   Diabetes Screening 1-2 times per year if you're at risk for diabetes or have pre-diabetes Fasting glucose: 89 mg/dL (10/2/2023)  A1C: 5.6 % (6/25/2021)  Screening Current   Cholesterol Screening Once every 5 years if you don't have a lipid disorder. May order more often based on risk  factors. Lipid panel: 08/02/2023  Screening Not Indicated  History Lipid Disorder      Other Preventive Screenings Covered by Medicare:  Abdominal Aortic Aneurysm (AAA) Screening: covered once if your at risk. You're considered to be at risk if you have a family history of AAA or a male between the age of 65-75 who smoking at least 100 cigarettes in your lifetime.  Lung Cancer Screening: covers low dose CT scan once per year if you meet all of the following conditions: (1) Age 55-77; (2) No signs or symptoms of lung cancer; (3) Current smoker or have quit smoking within the last 15 years; (4) You have a tobacco smoking history of at least 20 pack years (packs per day x number of years you smoked); (5) You get a written order from a healthcare provider.  Glaucoma Screening: covered annually if you're considered high risk: (1) You have diabetes OR (2) Family history of glaucoma OR (3)  aged 50 and older OR (4)  American aged 65 and older  Osteoporosis Screening: covered every 2 years if you meet one of the following conditions: (1) Have a vertebral abnormality; (2) On glucocorticoid therapy for more than 3 months; (3) Have primary hyperparathyroidism; (4) On osteoporosis medications and need to assess response to drug therapy.  HIV Screening: covered annually if you're between the age of 15-65. Also covered annually if you are younger than 15 and older than 65 with risk factors for HIV infection. For pregnant patients, it is covered up to 3 times per pregnancy.    Immunizations:  Immunization Recommendations   Influenza Vaccine Annual influenza vaccination during flu season is recommended for all persons aged >= 6 months who do not have contraindications   Pneumococcal Vaccine   * Pneumococcal conjugate vaccine = PCV13 (Prevnar 13), PCV15 (Vaxneuvance), PCV20 (Prevnar 20)  * Pneumococcal polysaccharide vaccine = PPSV23 (Pneumovax) Adults 19-65 yo with certain risk factors or if 65+ yo  If never  received any pneumonia vaccine: recommend Prevnar 20 (PCV20)  Give PCV20 if previously received 1 dose of PCV13 or PPSV23   Hepatitis B Vaccine 3 dose series if at intermediate or high risk (ex: diabetes, end stage renal disease, liver disease)   Respiratory syncytial virus (RSV) Vaccine - COVERED BY MEDICARE PART D  * RSVPreF3 (Arexvy) CDC recommends that adults 60 years of age and older may receive a single dose of RSV vaccine using shared clinical decision-making (SCDM)   Tetanus (Td) Vaccine - COST NOT COVERED BY MEDICARE PART B Following completion of primary series, a booster dose should be given every 10 years to maintain immunity against tetanus. Td may also be given as tetanus wound prophylaxis.   Tdap Vaccine - COST NOT COVERED BY MEDICARE PART B Recommended at least once for all adults. For pregnant patients, recommended with each pregnancy.   Shingles Vaccine (Shingrix) - COST NOT COVERED BY MEDICARE PART B  2 shot series recommended in those 19 years and older who have or will have weakened immune systems or those 50 years and older     Health Maintenance Due:      Topic Date Due   • Hepatitis C Screening  Completed   • Colorectal Cancer Screening  Discontinued     Immunizations Due:      Topic Date Due   • COVID-19 Vaccine (9 - 2023-24 season) 09/26/2023     Advance Directives   What are advance directives?  Advance directives are legal documents that state your wishes and plans for medical care. These plans are made ahead of time in case you lose your ability to make decisions for yourself. Advance directives can apply to any medical decision, such as the treatments you want, and if you want to donate organs.   What are the types of advance directives?  There are many types of advance directives, and each state has rules about how to use them. You may choose a combination of any of the following:  Living will:  This is a written record of the treatment you want. You can also choose which treatments  you do not want, which to limit, and which to stop at a certain time. This includes surgery, medicine, IV fluid, and tube feedings.   Durable power of  for healthcare (DPAHC):  This is a written record that states who you want to make healthcare choices for you when you are unable to make them for yourself. This person, called a proxy, is usually a family member or a friend. You may choose more than 1 proxy.  Do not resuscitate (DNR) order:  A DNR order is used in case your heart stops beating or you stop breathing. It is a request not to have certain forms of treatment, such as CPR. A DNR order may be included in other types of advance directives.  Medical directive:  This covers the care that you want if you are in a coma, near death, or unable to make decisions for yourself. You can list the treatments you want for each condition. Treatment may include pain medicine, surgery, blood transfusions, dialysis, IV or tube feedings, and a ventilator (breathing machine).  Values history:  This document has questions about your views, beliefs, and how you feel and think about life. This information can help others choose the care that you would choose.  Why are advance directives important?  An advance directive helps you control your care. Although spoken wishes may be used, it is better to have your wishes written down. Spoken wishes can be misunderstood, or not followed. Treatments may be given even if you do not want them. An advance directive may make it easier for your family to make difficult choices about your care.       © Copyright [facility] 2018 Information is for End User's use only and may not be sold, redistributed or otherwise used for commercial purposes. All illustrations and images included in CareNotes® are the copyrighted property of [employer] or [employer]

## 2024-04-22 NOTE — TELEPHONE ENCOUNTER
Patient declined to schedule at this time, patient advised he would follow up closer to one year post-op or sooner if any issues.

## 2024-06-10 ENCOUNTER — TELEPHONE (OUTPATIENT)
Age: 74
End: 2024-06-10

## 2024-06-10 DIAGNOSIS — N52.9 ERECTILE DYSFUNCTION, UNSPECIFIED ERECTILE DYSFUNCTION TYPE: ICD-10-CM

## 2024-06-10 RX ORDER — TADALAFIL 10 MG/1
10 TABLET ORAL DAILY PRN
Qty: 10 TABLET | Refills: 3 | Status: SHIPPED | OUTPATIENT
Start: 2024-06-10

## 2024-06-10 NOTE — TELEPHONE ENCOUNTER
Cialis dose increased to 10 mg, if 10 mg not effective patient can utilize 2 pills which would equal 20 mg.  Please let him know that 20 mg is the maximum dosage.

## 2024-06-10 NOTE — TELEPHONE ENCOUNTER
Pt called to request a refill of tadalafil but is inquiring if the dosage can be increased. Pt requesting a new script be sent to the [facility] on file. Please call pt directly to advise on the dosage information.     Pt call back: [phone]

## 2024-06-11 NOTE — TELEPHONE ENCOUNTER
Called Moisés back and notified him that 10 mg was sent to pharmacy - there is a refill of 3 and he he can take a max of 20 mg if needed - He understood

## 2024-08-05 ENCOUNTER — APPOINTMENT (OUTPATIENT)
Dept: LAB | Facility: CLINIC | Age: 74
End: 2024-08-05
Payer: COMMERCIAL

## 2024-08-05 DIAGNOSIS — Z13.220 ENCOUNTER FOR LIPID SCREENING FOR CARDIOVASCULAR DISEASE: ICD-10-CM

## 2024-08-05 DIAGNOSIS — Z12.5 PROSTATE CANCER SCREENING: ICD-10-CM

## 2024-08-05 DIAGNOSIS — Z13.6 ENCOUNTER FOR LIPID SCREENING FOR CARDIOVASCULAR DISEASE: ICD-10-CM

## 2024-08-05 DIAGNOSIS — N18.30 STAGE 3 CHRONIC KIDNEY DISEASE, UNSPECIFIED WHETHER STAGE 3A OR 3B CKD (HCC): ICD-10-CM

## 2024-08-05 LAB
ALBUMIN SERPL BCG-MCNC: 4 G/DL (ref 3.5–5)
ALP SERPL-CCNC: 53 U/L (ref 34–104)
ALT SERPL W P-5'-P-CCNC: 7 U/L (ref 7–52)
ANION GAP SERPL CALCULATED.3IONS-SCNC: 7 MMOL/L (ref 4–13)
AST SERPL W P-5'-P-CCNC: 12 U/L (ref 13–39)
BILIRUB SERPL-MCNC: 0.5 MG/DL (ref 0.2–1)
BUN SERPL-MCNC: 12 MG/DL (ref 5–25)
CALCIUM SERPL-MCNC: 8.9 MG/DL (ref 8.4–10.2)
CHLORIDE SERPL-SCNC: 108 MMOL/L (ref 96–108)
CHOLEST SERPL-MCNC: 197 MG/DL
CO2 SERPL-SCNC: 27 MMOL/L (ref 21–32)
CREAT SERPL-MCNC: 1.14 MG/DL (ref 0.6–1.3)
ERYTHROCYTE [DISTWIDTH] IN BLOOD BY AUTOMATED COUNT: 17.2 % (ref 11.6–15.1)
GFR SERPL CREATININE-BSD FRML MDRD: 63 ML/MIN/1.73SQ M
GLUCOSE P FAST SERPL-MCNC: 87 MG/DL (ref 65–99)
HCT VFR BLD AUTO: 43.7 % (ref 36.5–49.3)
HDLC SERPL-MCNC: 82 MG/DL
HGB BLD-MCNC: 13.6 G/DL (ref 12–17)
LDLC SERPL CALC-MCNC: 103 MG/DL (ref 0–100)
MCH RBC QN AUTO: 21.9 PG (ref 26.8–34.3)
MCHC RBC AUTO-ENTMCNC: 31.1 G/DL (ref 31.4–37.4)
MCV RBC AUTO: 70 FL (ref 82–98)
NONHDLC SERPL-MCNC: 115 MG/DL
PLATELET # BLD AUTO: 251 THOUSANDS/UL (ref 149–390)
PMV BLD AUTO: 10.7 FL (ref 8.9–12.7)
POTASSIUM SERPL-SCNC: 4.1 MMOL/L (ref 3.5–5.3)
PROT SERPL-MCNC: 6.6 G/DL (ref 6.4–8.4)
PSA SERPL-MCNC: 10.04 NG/ML (ref 0–4)
RBC # BLD AUTO: 6.22 MILLION/UL (ref 3.88–5.62)
SODIUM SERPL-SCNC: 142 MMOL/L (ref 135–147)
TRIGL SERPL-MCNC: 62 MG/DL
WBC # BLD AUTO: 4.31 THOUSAND/UL (ref 4.31–10.16)

## 2024-08-05 PROCEDURE — 80053 COMPREHEN METABOLIC PANEL: CPT

## 2024-08-05 PROCEDURE — 85027 COMPLETE CBC AUTOMATED: CPT

## 2024-08-05 PROCEDURE — G0103 PSA SCREENING: HCPCS

## 2024-08-05 PROCEDURE — 36415 COLL VENOUS BLD VENIPUNCTURE: CPT

## 2024-08-05 PROCEDURE — 80061 LIPID PANEL: CPT

## 2024-08-08 ENCOUNTER — TELEPHONE (OUTPATIENT)
Dept: OTHER | Facility: HOSPITAL | Age: 74
End: 2024-08-08

## 2024-08-08 DIAGNOSIS — R97.20 ELEVATED PSA: Primary | ICD-10-CM

## 2024-08-08 NOTE — TELEPHONE ENCOUNTER
PSA elevated at 10.  Previously PSAs were in the 3 range (6) when corrected for finasteride.  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  He is status post PE.  He has been off of finasteride and this is his first PSA since been off of finasteride.  Due to conflicting effect with PAE/off of finasteride rising PSA is unknown significance.  Will continue to trend.  Repeat PSA in the next 4 to 6 weeks

## 2024-08-09 NOTE — TELEPHONE ENCOUNTER
If the patient had sexual intercourse or ejaculation around the time of PSA obtainment while utilizing the Cialis it certainly can elevate the PSA.  Cialis alone would not elevate the PSA.

## 2024-08-09 NOTE — TELEPHONE ENCOUNTER
Patient called back to say that he has been taking Cialis 10MG every now and then, and is questioning if this could be a reason why his PSA was elevated?    Please review.    Call back [phone]

## 2024-08-12 NOTE — TELEPHONE ENCOUNTER
Left message per communication form advising patient of AP's note. Office number provided with any further questions or concerns.

## 2024-08-14 ENCOUNTER — OFFICE VISIT (OUTPATIENT)
Dept: INTERNAL MEDICINE CLINIC | Facility: OTHER | Age: 74
End: 2024-08-14
Payer: COMMERCIAL

## 2024-08-14 VITALS
TEMPERATURE: 96.9 F | HEIGHT: 72 IN | SYSTOLIC BLOOD PRESSURE: 110 MMHG | OXYGEN SATURATION: 98 % | DIASTOLIC BLOOD PRESSURE: 70 MMHG | WEIGHT: 145.6 LBS | BODY MASS INDEX: 19.72 KG/M2 | HEART RATE: 60 BPM

## 2024-08-14 DIAGNOSIS — E55.9 VITAMIN D DEFICIENCY: ICD-10-CM

## 2024-08-14 DIAGNOSIS — R71.8 MICROCYTOSIS: Primary | ICD-10-CM

## 2024-08-14 DIAGNOSIS — E44.1 MILD PROTEIN-CALORIE MALNUTRITION (HCC): ICD-10-CM

## 2024-08-14 DIAGNOSIS — N13.8 BPH WITH OBSTRUCTION/LOWER URINARY TRACT SYMPTOMS: ICD-10-CM

## 2024-08-14 DIAGNOSIS — N40.1 BPH WITH OBSTRUCTION/LOWER URINARY TRACT SYMPTOMS: ICD-10-CM

## 2024-08-14 DIAGNOSIS — N18.30 STAGE 3 CHRONIC KIDNEY DISEASE, UNSPECIFIED WHETHER STAGE 3A OR 3B CKD (HCC): ICD-10-CM

## 2024-08-14 PROCEDURE — G2211 COMPLEX E/M VISIT ADD ON: HCPCS | Performed by: INTERNAL MEDICINE

## 2024-08-14 PROCEDURE — 99214 OFFICE O/P EST MOD 30 MIN: CPT | Performed by: INTERNAL MEDICINE

## 2024-08-14 NOTE — PROGRESS NOTES
Assessment/Plan:    Mild protein-calorie malnutrition (HCC)  Discussed diet and exercise.    Vitamin D deficiency  Continue vitamin D supplementation.     Microcytosis  Continue to trend CBC and iron panel.     BPH with obstruction/lower urinary tract symptoms  Continue follow up with urology.     Stage 3 chronic kidney disease, unspecified whether stage 3a or 3b CKD (HCC)  Lab Results   Component Value Date    EGFR 63 08/05/2024    EGFR 62 10/02/2023    EGFR 59 08/02/2023    CREATININE 1.14 08/05/2024    CREATININE 1.15 10/02/2023    CREATININE 1.20 08/02/2023   Continue to trend kidney function.        Diagnoses and all orders for this visit:    Microcytosis  -     Iron Panel (Includes Ferritin, Iron Sat%, Iron, and TIBC); Future  -     CBC and Platelet; Future    Stage 3 chronic kidney disease, unspecified whether stage 3a or 3b CKD (HCC)    Mild protein-calorie malnutrition (HCC)    Vitamin D deficiency    BPH with obstruction/lower urinary tract symptoms                  Subjective:      Patient ID: Moisés Pettit is a 73 y.o. male.    Chief Complaint   Patient presents with   • Follow-up     Pt is here for a follow up.  Review labs.  Discuss elevated PSA.  Pt thinks may have been straining during sex.  Will recheck level in 1m       Moisés Pettit is seen today for follow up of chronic conditions.   Recent laboratory studies reviewed today with the patient.   he has been compliant with his medication regimen.   He had an increase in PSA to 10. It was felt it may be due to medication (cialis), intercourse prior to having PSA draw, and constipation (from iron). He has been following up with his urologist.   He would like to stop iron supplementation. Most recent iron studies were within normal range.   he has no complaints or concerns at this time.           The following portions of the patient's history were reviewed and updated as appropriate: allergies, current medications, past family history, past medical  history, past social history, past surgical history, and problem list.    Review of Systems   Constitutional:  Negative for activity change, appetite change, chills, diaphoresis, fatigue and fever.   HENT:  Negative for congestion, postnasal drip, rhinorrhea, sinus pressure, sinus pain, sneezing and sore throat.    Eyes:  Negative for visual disturbance.   Respiratory:  Negative for apnea, cough, choking, chest tightness, shortness of breath and wheezing.    Cardiovascular:  Negative for chest pain, palpitations and leg swelling.   Gastrointestinal:  Negative for abdominal distention, abdominal pain, anal bleeding, blood in stool, constipation, diarrhea, nausea and vomiting.   Endocrine: Negative for cold intolerance and heat intolerance.   Genitourinary:  Negative for difficulty urinating, dysuria and hematuria.   Musculoskeletal: Negative.    Skin: Negative.    Neurological:  Negative for dizziness, weakness, light-headedness, numbness and headaches.   Hematological:  Negative for adenopathy.   Psychiatric/Behavioral:  Negative for agitation, sleep disturbance and suicidal ideas.    All other systems reviewed and are negative.        Past Medical History:   Diagnosis Date   • Anemia     B12 def   • Bunion of right foot 09/17/2020   • Colon polyp    • Constipation    • COVID-19 06/2022   • COVID-19 12/29/2023   • Depression 05/31/2022   • Glaucoma     suspect   • Mixed hyperlipidemia 01/09/2019   • Moderate mixed hyperlipidemia not requiring statin therapy 01/09/2019   • Pre-syncope 05/31/2022   • Spasm of abdominal muscles of left side 01/29/2020         Current Outpatient Medications:   •  bisacodyl (DULCOLAX) 5 mg EC tablet, Take 1 tablet (5 mg total) by mouth daily as needed for constipation for up to 7 days, Disp: 7 tablet, Rfl: 0  •  docusate sodium (COLACE) 100 mg capsule, Take 100 mg by mouth as needed , Disp: , Rfl:   •  dorzolamide-timolol (COSOPT) 22.3-6.8 MG/ML ophthalmic solution, , Disp: , Rfl:   •   "ferrous gluconate (FERGON) 240 (27 FE) MG tablet, Take 1 tablet (240 mg total) by mouth in the morning, Disp: 90 tablet, Rfl: 3  •  ibuprofen (MOTRIN) 800 mg tablet, Take 1 tablet (800 mg total) by mouth every 8 (eight) hours as needed for moderate pain or fever for up to 7 days, Disp: 21 tablet, Rfl: 0  •  latanoprost (XALATAN) 0.005 % ophthalmic solution, Administer 1 drop to both eyes daily at bedtime , Disp: , Rfl:   •  Multiple Vitamin (MULTIVITAMIN) tablet, Take 1 tablet by mouth as needed, Disp: , Rfl:   •  Multiple Vitamins-Minerals (PRESERVISION AREDS PO), Take 1 capsule by mouth 2 (two) times a day, Disp: , Rfl:   •  tadalafil (CIALIS) 10 MG tablet, Take 1 tablet (10 mg total) by mouth daily as needed for erectile dysfunction, Disp: 10 tablet, Rfl: 3    No Known Allergies    Social History   Past Surgical History:   Procedure Laterality Date   • ABDOMINAL SURGERY      \"intestine surgery\"    • BUNIONECTOMY     • COLONOSCOPY     • HERNIA REPAIR  2010   • HERNIA REPAIR  2016   • HERNIA REPAIR     • IR PROSTATE ARTERY EMBOLIZATION  10/2/2023   • NE CORRJ HLX VLGS BNCTY SESMDC DSTL METAR OSTEOT Right 10/12/2020    Procedure: BUNIONECTOMY, DOUBLE OSTEOTOMY;  Surgeon: Troy Nobles DPM;  Location:  MAIN OR;  Service: Podiatry   • NE EXCISION TUMOR SOFT TIS BACK/FLANK SUBQ 3 CM/> N/A 11/12/2021    Procedure: EXCISION  BIOPSY LESION/MASS BACK;  Surgeon: Fahad Barroso MD;  Location: [facility];  Service: General   • NE EXCISION TUMOR SOFT TISSUE SHOULDER SUBQ 3 CM/> Right 11/12/2021    Procedure: EXCISION BIOPSY TISSUE LESION/MASS UPPER EXTREMITY;  Surgeon: Fahad Barroso MD;  Location: [facility];  Service: General     Family History   Problem Relation Age of Onset   • Hypertension Mother    • Uterine cancer Sister        Objective:  /70 (BP Location: Left arm, Patient Position: Sitting, Cuff Size: Adult)   Pulse 60   Temp (!) 96.9 °F (36.1 °C) (Tympanic)   Ht 6' (1.829 m)   Wt 66 kg (145 lb " 9.6 oz)   SpO2 98% Comment: ra  BMI 19.75 kg/m²     Recent Results (from the past 1344 hour(s))   CBC    Collection Time: 08/05/24  9:15 AM   Result Value Ref Range    WBC 4.31 4.31 - 10.16 Thousand/uL    RBC 6.22 (H) 3.88 - 5.62 Million/uL    Hemoglobin 13.6 12.0 - 17.0 g/dL    Hematocrit 43.7 36.5 - 49.3 %    MCV 70 (L) 82 - 98 fL    MCH 21.9 (L) 26.8 - 34.3 pg    MCHC 31.1 (L) 31.4 - 37.4 g/dL    RDW 17.2 (H) 11.6 - 15.1 %    Platelets 251 149 - 390 Thousands/uL    MPV 10.7 8.9 - 12.7 fL   Comprehensive metabolic panel    Collection Time: 08/05/24  9:15 AM   Result Value Ref Range    Sodium 142 135 - 147 mmol/L    Potassium 4.1 3.5 - 5.3 mmol/L    Chloride 108 96 - 108 mmol/L    CO2 27 21 - 32 mmol/L    ANION GAP 7 4 - 13 mmol/L    BUN 12 5 - 25 mg/dL    Creatinine 1.14 0.60 - 1.30 mg/dL    Glucose, Fasting 87 65 - 99 mg/dL    Calcium 8.9 8.4 - 10.2 mg/dL    AST 12 (L) 13 - 39 U/L    ALT 7 7 - 52 U/L    Alkaline Phosphatase 53 34 - 104 U/L    Total Protein 6.6 6.4 - 8.4 g/dL    Albumin 4.0 3.5 - 5.0 g/dL    Total Bilirubin 0.50 0.20 - 1.00 mg/dL    eGFR 63 ml/min/1.73sq m   Lipid panel    Collection Time: 08/05/24  9:15 AM   Result Value Ref Range    Cholesterol 197 See Comment mg/dL    Triglycerides 62 See Comment mg/dL    HDL, Direct 82 >=40 mg/dL    LDL Calculated 103 (H) 0 - 100 mg/dL    Non-HDL-Chol (CHOL-HDL) 115 mg/dl   PSA, Total Screen    Collection Time: 08/05/24  9:15 AM   Result Value Ref Range    PSA 10.037 (H) 0.000 - 4.000 ng/mL            Physical Exam  Vitals and nursing note reviewed.   Constitutional:       General: He is not in acute distress.     Appearance: He is well-developed. He is not diaphoretic.   HENT:      Head: Normocephalic and atraumatic.   Eyes:      General: No scleral icterus.        Right eye: No discharge.         Left eye: No discharge.      Conjunctiva/sclera: Conjunctivae normal.      Pupils: Pupils are equal, round, and reactive to light.   Neck:      Thyroid: No  thyromegaly.      Vascular: No JVD.   Cardiovascular:      Rate and Rhythm: Normal rate and regular rhythm.      Heart sounds: Normal heart sounds. No murmur heard.     No friction rub. No gallop.   Pulmonary:      Effort: Pulmonary effort is normal. No respiratory distress.      Breath sounds: Normal breath sounds. No wheezing or rales.   Chest:      Chest wall: No tenderness.   Abdominal:      General: Bowel sounds are normal. There is no distension.      Palpations: Abdomen is soft. There is no mass.      Tenderness: There is no abdominal tenderness. There is no guarding or rebound.   Musculoskeletal:         General: No tenderness or deformity. Normal range of motion.      Cervical back: Normal range of motion and neck supple.   Lymphadenopathy:      Cervical: No cervical adenopathy.   Skin:     General: Skin is warm and dry.      Coloration: Skin is not pale.      Findings: No erythema or rash.   Neurological:      Mental Status: He is alert and oriented to person, place, and time.      Cranial Nerves: No cranial nerve deficit.      Coordination: Coordination normal.      Deep Tendon Reflexes: Reflexes are normal and symmetric.   Psychiatric:         Behavior: Behavior normal.         Thought Content: Thought content normal.         Judgment: Judgment normal.

## 2024-08-14 NOTE — ASSESSMENT & PLAN NOTE
Lab Results   Component Value Date    EGFR 63 08/05/2024    EGFR 62 10/02/2023    EGFR 59 08/02/2023    CREATININE 1.14 08/05/2024    CREATININE 1.15 10/02/2023    CREATININE 1.20 08/02/2023   Continue to trend kidney function.

## 2024-09-10 ENCOUNTER — APPOINTMENT (OUTPATIENT)
Dept: LAB | Facility: CLINIC | Age: 74
End: 2024-09-10
Payer: COMMERCIAL

## 2024-09-10 DIAGNOSIS — R71.8 MICROCYTOSIS: ICD-10-CM

## 2024-09-10 DIAGNOSIS — R97.20 ELEVATED PSA: ICD-10-CM

## 2024-09-10 LAB
PSA FREE MFR SERPL: 33.1 %
PSA FREE SERPL-MCNC: 1.92 NG/ML
PSA SERPL-MCNC: 5.81 NG/ML (ref 0–4)

## 2024-09-10 PROCEDURE — 84153 ASSAY OF PSA TOTAL: CPT

## 2024-09-10 PROCEDURE — 36415 COLL VENOUS BLD VENIPUNCTURE: CPT

## 2024-09-10 PROCEDURE — 84154 ASSAY OF PSA FREE: CPT

## 2024-09-12 ENCOUNTER — TELEPHONE (OUTPATIENT)
Dept: UROLOGY | Facility: AMBULATORY SURGERY CENTER | Age: 74
End: 2024-09-12

## 2024-09-12 DIAGNOSIS — R97.20 ELEVATED PSA: Primary | ICD-10-CM

## 2024-09-12 NOTE — TELEPHONE ENCOUNTER
Called patient to let him now that his PSA came down more then half. And Because of reassuring PSA trend I do not think with need anything further at this point in time except repeat PSA before his January follow-up.  PSA ordered. Patient understood everything.

## 2024-09-12 NOTE — TELEPHONE ENCOUNTER
----- Message from Cheikh Ibarra PA-C sent at 9/12/2024  8:54 AM EDT -----  PSA did decrease by almost half.  Reassuring free PSA percentage.  Previous MRI with prostamegaly and PI-RADS 2 designation known prostamegaly s/p PE not on finasteride anymore.  Because of reassuring PSA trend I do not think with need anything further at this point in time except repeat PSA before his January follow-up.  PSA ordered

## 2024-12-17 ENCOUNTER — OFFICE VISIT (OUTPATIENT)
Age: 74
End: 2024-12-17
Payer: COMMERCIAL

## 2024-12-17 VITALS
TEMPERATURE: 98.8 F | SYSTOLIC BLOOD PRESSURE: 112 MMHG | HEART RATE: 80 BPM | HEIGHT: 72 IN | BODY MASS INDEX: 20.62 KG/M2 | WEIGHT: 152.2 LBS | DIASTOLIC BLOOD PRESSURE: 84 MMHG | OXYGEN SATURATION: 99 %

## 2024-12-17 DIAGNOSIS — J40 BRONCHITIS: Primary | ICD-10-CM

## 2024-12-17 PROCEDURE — 99213 OFFICE O/P EST LOW 20 MIN: CPT | Performed by: NURSE PRACTITIONER

## 2024-12-17 PROCEDURE — G2211 COMPLEX E/M VISIT ADD ON: HCPCS | Performed by: NURSE PRACTITIONER

## 2024-12-17 RX ORDER — FLUTICASONE PROPIONATE 50 MCG
2 SPRAY, SUSPENSION (ML) NASAL DAILY
Qty: 18 ML | Refills: 5 | Status: SHIPPED | OUTPATIENT
Start: 2024-12-17

## 2024-12-17 RX ORDER — AZITHROMYCIN 250 MG/1
TABLET, FILM COATED ORAL
Qty: 6 TABLET | Refills: 0 | Status: SHIPPED | OUTPATIENT
Start: 2024-12-17 | End: 2024-12-22

## 2024-12-17 RX ORDER — BENZONATATE 200 MG/1
200 CAPSULE ORAL 3 TIMES DAILY PRN
Qty: 30 CAPSULE | Refills: 0 | Status: SHIPPED | OUTPATIENT
Start: 2024-12-17

## 2024-12-17 NOTE — ASSESSMENT & PLAN NOTE
-Start azithromycin, Tessalon Perles and Flonase continue Mucinex  Rest and fluids advised.   Educated that the course of this illness could be 2-4 weeks.   Discussed symptomatic relief, such as warm steam inhalations, tylenol/ibuprofen for fevers and body aches, rest, and drink plenty of fluids.  Warm salt gargles for sore throat.   Discussed red flag signs to go to the ER, such as chest pain or shortness of breath.   Return to the office for reevaluation if symptoms worsen or do not improve in 1-2 weeks

## 2024-12-17 NOTE — PROGRESS NOTES
Assessment/Plan:    Bronchitis  -Start azithromycin, Tessalon Perles and Flonase continue Mucinex  Rest and fluids advised.   Educated that the course of this illness could be 2-4 weeks.   Discussed symptomatic relief, such as warm steam inhalations, tylenol/ibuprofen for fevers and body aches, rest, and drink plenty of fluids.  Warm salt gargles for sore throat.   Discussed red flag signs to go to the ER, such as chest pain or shortness of breath.   Return to the office for reevaluation if symptoms worsen or do not improve in 1-2 weeks               Diagnoses and all orders for this visit:    Bronchitis  -     azithromycin (Zithromax) 250 mg tablet; Take 2 tablets (500 mg total) by mouth daily for 1 day, THEN 1 tablet (250 mg total) daily for 4 days.  -     benzonatate (TESSALON) 200 MG capsule; Take 1 capsule (200 mg total) by mouth 3 (three) times a day as needed for cough  -     fluticasone (FLONASE) 50 mcg/act nasal spray; 2 sprays into each nostril daily          Subjective:      Patient ID: Moisés Pettit is a 74 y.o. male.    Patient presents today with cold like symptoms.     Denies sick contacts     URI   This is a new problem. The current episode started in the past 7 days. There has been no fever. Associated symptoms include congestion, coughing and a sore throat. Pertinent negatives include no abdominal pain, chest pain, diarrhea, dysuria, ear pain, headaches, nausea, neck pain, rash, rhinorrhea, sinus pain, vomiting or wheezing. Associated symptoms comments: Cough is keeping him out at night . Treatments tried: mucinex and dayquil. The treatment provided no relief.     The following portions of the patient's history were reviewed and updated as appropriate: allergies, current medications, past family history, past medical history, past social history, past surgical history, and problem list.    Review of Systems   Constitutional:  Positive for fatigue. Negative for activity change, appetite change,  chills, diaphoresis and fever.   HENT:  Positive for congestion and sore throat. Negative for ear discharge, ear pain, postnasal drip, rhinorrhea, sinus pressure and sinus pain.    Eyes:  Negative for pain, discharge, itching and visual disturbance.   Respiratory:  Positive for cough. Negative for chest tightness, shortness of breath and wheezing.    Cardiovascular:  Negative for chest pain, palpitations and leg swelling.   Gastrointestinal:  Negative for abdominal pain, constipation, diarrhea, nausea and vomiting.   Endocrine: Negative for polydipsia, polyphagia and polyuria.   Genitourinary:  Negative for difficulty urinating, dysuria and urgency.   Musculoskeletal:  Negative for arthralgias, back pain and neck pain.   Skin:  Negative for rash and wound.   Neurological:  Negative for dizziness, weakness, numbness and headaches.         Past Medical History:   Diagnosis Date    Anemia     B12 def    Bunion of right foot 09/17/2020    Colon polyp     Constipation     COVID-19 06/2022    COVID-19 12/29/2023    Depression 05/31/2022    Glaucoma     suspect    Mixed hyperlipidemia 01/09/2019    Moderate mixed hyperlipidemia not requiring statin therapy 01/09/2019    Pre-syncope 05/31/2022    Spasm of abdominal muscles of left side 01/29/2020         Current Outpatient Medications:     azithromycin (Zithromax) 250 mg tablet, Take 2 tablets (500 mg total) by mouth daily for 1 day, THEN 1 tablet (250 mg total) daily for 4 days., Disp: 6 tablet, Rfl: 0    benzonatate (TESSALON) 200 MG capsule, Take 1 capsule (200 mg total) by mouth 3 (three) times a day as needed for cough, Disp: 30 capsule, Rfl: 0    bisacodyl (DULCOLAX) 5 mg EC tablet, Take 1 tablet (5 mg total) by mouth daily as needed for constipation for up to 7 days, Disp: 7 tablet, Rfl: 0    docusate sodium (COLACE) 100 mg capsule, Take 100 mg by mouth as needed , Disp: , Rfl:     dorzolamide-timolol (COSOPT) 22.3-6.8 MG/ML ophthalmic solution, , Disp: , Rfl:      "ferrous gluconate (FERGON) 240 (27 FE) MG tablet, Take 1 tablet (240 mg total) by mouth in the morning, Disp: 90 tablet, Rfl: 3    fluticasone (FLONASE) 50 mcg/act nasal spray, 2 sprays into each nostril daily, Disp: 18 mL, Rfl: 5    ibuprofen (MOTRIN) 800 mg tablet, Take 1 tablet (800 mg total) by mouth every 8 (eight) hours as needed for moderate pain or fever for up to 7 days, Disp: 21 tablet, Rfl: 0    latanoprost (XALATAN) 0.005 % ophthalmic solution, Administer 1 drop to both eyes daily at bedtime , Disp: , Rfl:     Multiple Vitamin (MULTIVITAMIN) tablet, Take 1 tablet by mouth as needed, Disp: , Rfl:     Multiple Vitamins-Minerals (PRESERVISION AREDS PO), Take 1 capsule by mouth 2 (two) times a day, Disp: , Rfl:     tadalafil (CIALIS) 10 MG tablet, Take 1 tablet (10 mg total) by mouth daily as needed for erectile dysfunction, Disp: 10 tablet, Rfl: 3    No Known Allergies    Social History   Past Surgical History:   Procedure Laterality Date    ABDOMINAL SURGERY      \"intestine surgery\"     BUNIONECTOMY      COLONOSCOPY      HERNIA REPAIR  2010    HERNIA REPAIR  2016    HERNIA REPAIR      IR PROSTATE ARTERY EMBOLIZATION  10/2/2023    [facility] 10/12/2020    Procedure: BUNIONECTOMY, DOUBLE OSTEOTOMY;  Surgeon: Troy Nobles DPM;  Location:  MAIN OR;  Service: Podiatry    CT EXCISION TUMOR SOFT TIS BACK/FLANK SUBQ 3 CM/> N/A 11/12/2021    Procedure: EXCISION  BIOPSY LESION/MASS BACK;  Surgeon: Fahad Barroso MD;  Location: [facility];  Service: General    CT EXCISION TUMOR SOFT TISSUE SHOULDER SUBQ 3 CM/> Right 11/12/2021    Procedure: EXCISION BIOPSY TISSUE LESION/MASS UPPER EXTREMITY;  Surgeon: Fahad Barroso MD;  Location: [facility];  Service: General     Family History   Problem Relation Age of Onset    Hypertension Mother     Uterine cancer Sister        Objective:  /84 (BP Location: Left arm, Patient Position: Sitting, Cuff Size: Standard)   " "Pulse 80   Temp 98.8 °F (37.1 °C) (Temporal)   Ht 6' 0.05\" (1.83 m)   Wt 69 kg (152 lb 3.2 oz)   SpO2 99%   BMI 20.62 kg/m²              Physical Exam  Constitutional:       General: He is not in acute distress.     Appearance: He is well-developed. He is not diaphoretic.   HENT:      Head: Normocephalic and atraumatic.      Right Ear: External ear normal.      Left Ear: External ear normal.      Nose: Nose normal.      Mouth/Throat:      Mouth: Mucous membranes are moist.      Pharynx: Posterior oropharyngeal erythema present. No oropharyngeal exudate.   Eyes:      General:         Right eye: No discharge.         Left eye: No discharge.      Conjunctiva/sclera: Conjunctivae normal.      Pupils: Pupils are equal, round, and reactive to light.   Neck:      Thyroid: No thyromegaly.   Cardiovascular:      Rate and Rhythm: Normal rate and regular rhythm.      Heart sounds: Normal heart sounds. No murmur heard.     No friction rub. No gallop.   Pulmonary:      Effort: Pulmonary effort is normal. No respiratory distress.      Breath sounds: Normal breath sounds. No stridor. No wheezing or rales.   Abdominal:      General: Bowel sounds are normal. There is no distension.      Palpations: Abdomen is soft.      Tenderness: There is no abdominal tenderness.   Musculoskeletal:      Cervical back: Normal range of motion and neck supple.   Lymphadenopathy:      Head:      Right side of head: Submandibular and tonsillar adenopathy present.      Left side of head: Submandibular and tonsillar adenopathy present.      Cervical: No cervical adenopathy.   Skin:     General: Skin is warm and dry.      Findings: No erythema or rash.   Neurological:      Mental Status: He is alert and oriented to person, place, and time.   Psychiatric:         Behavior: Behavior normal.         Thought Content: Thought content normal.         Judgment: Judgment normal.         "

## 2025-01-08 DIAGNOSIS — J40 BRONCHITIS: ICD-10-CM

## 2025-01-09 RX ORDER — FLUTICASONE PROPIONATE 50 MCG
SPRAY, SUSPENSION (ML) NASAL
Qty: 48 ML | Refills: 1 | Status: SHIPPED | OUTPATIENT
Start: 2025-01-09

## 2025-01-16 ENCOUNTER — APPOINTMENT (OUTPATIENT)
Dept: LAB | Facility: CLINIC | Age: 75
End: 2025-01-16
Payer: COMMERCIAL

## 2025-01-16 DIAGNOSIS — R97.20 ELEVATED PSA: ICD-10-CM

## 2025-01-16 LAB
ERYTHROCYTE [DISTWIDTH] IN BLOOD BY AUTOMATED COUNT: 17.9 % (ref 11.6–15.1)
FERRITIN SERPL-MCNC: 149 NG/ML (ref 24–336)
HCT VFR BLD AUTO: 47.3 % (ref 36.5–49.3)
HGB BLD-MCNC: 14.7 G/DL (ref 12–17)
MCH RBC QN AUTO: 22 PG (ref 26.8–34.3)
MCHC RBC AUTO-ENTMCNC: 31.1 G/DL (ref 31.4–37.4)
MCV RBC AUTO: 71 FL (ref 82–98)
PLATELET # BLD AUTO: 248 THOUSANDS/UL (ref 149–390)
PMV BLD AUTO: 10.1 FL (ref 8.9–12.7)
PSA FREE MFR SERPL: 32.4 %
PSA FREE SERPL-MCNC: 2.21 NG/ML
PSA SERPL-MCNC: 6.82 NG/ML (ref 0–4)
RBC # BLD AUTO: 6.68 MILLION/UL (ref 3.88–5.62)
WBC # BLD AUTO: 5.4 THOUSAND/UL (ref 4.31–10.16)

## 2025-01-16 PROCEDURE — 84153 ASSAY OF PSA TOTAL: CPT

## 2025-01-16 PROCEDURE — 36415 COLL VENOUS BLD VENIPUNCTURE: CPT

## 2025-01-16 PROCEDURE — 84154 ASSAY OF PSA FREE: CPT

## 2025-01-17 LAB
IRON SATN MFR SERPL: 43 % (ref 15–50)
IRON SERPL-MCNC: 87 UG/DL (ref 50–212)
TIBC SERPL-MCNC: 200.2 UG/DL (ref 250–450)
TRANSFERRIN SERPL-MCNC: 143 MG/DL (ref 203–362)
UIBC SERPL-MCNC: 113 UG/DL (ref 155–355)

## 2025-01-21 ENCOUNTER — RESULTS FOLLOW-UP (OUTPATIENT)
Dept: UROLOGY | Facility: AMBULATORY SURGERY CENTER | Age: 75
End: 2025-01-21

## 2025-01-23 ENCOUNTER — OFFICE VISIT (OUTPATIENT)
Dept: UROLOGY | Facility: AMBULATORY SURGERY CENTER | Age: 75
End: 2025-01-23
Payer: COMMERCIAL

## 2025-01-23 VITALS
SYSTOLIC BLOOD PRESSURE: 122 MMHG | DIASTOLIC BLOOD PRESSURE: 74 MMHG | BODY MASS INDEX: 20.59 KG/M2 | OXYGEN SATURATION: 96 % | HEART RATE: 59 BPM | WEIGHT: 152 LBS | HEIGHT: 72 IN

## 2025-01-23 DIAGNOSIS — N52.9 ERECTILE DYSFUNCTION, UNSPECIFIED ERECTILE DYSFUNCTION TYPE: ICD-10-CM

## 2025-01-23 LAB — POST-VOID RESIDUAL VOLUME, ML POC: 16 ML

## 2025-01-23 PROCEDURE — 99213 OFFICE O/P EST LOW 20 MIN: CPT

## 2025-01-23 PROCEDURE — 51798 US URINE CAPACITY MEASURE: CPT

## 2025-01-23 RX ORDER — TADALAFIL 10 MG/1
10 TABLET ORAL DAILY PRN
Qty: 10 TABLET | Refills: 2 | Status: SHIPPED | OUTPATIENT
Start: 2025-01-23

## 2025-01-23 NOTE — PROGRESS NOTES
Office Visit- Urology  Moisés Pettit 1950 MRN: [id number]      Assessment/Discussion/Plan    74 y.o. male managed by       BPH with urinary tract symptoms  -S/p prostate artery embolization in October 2023  -Significant improvement in urinary symptoms since PAE  -Not on any pharmacotherapy     2.  Erectile dysfunction  -Trial coming off of the finasteride  -Utilizing Cialis 10 mg     3.  Prostate cancer screening/elevated PSA  -S/p negative biopsy in 2018  -Multiparametric MRI of the prostate in February 2023 returned PI-RADS 2 with prostamegaly at 245 g  -Last PSA is 3.0 (6.0 when corrected for finasteride)in January 2023.  Off of finasteride PSA was measured at 10.03 in August 2024 he had an updated PSA in September that  decreased to 5.8 with a free PSA of 33%.  He obtained an updated PSA that returned at 6.8 which returned at 32% free PSA percentage  -PSA largely stable when comparing a PSA of 6.8 off of finasteride and a PSA of 3.0 which was corrected to 6.0  -YULIA today with significantly enlarged prostate but no nodules appreciated    Chief Complaint:   Moisés is a 74 y.o. male presenting to the office for a follow up visit regarding  Elevated PSA        Subjective    Hx 1/30/2023    Moisés is a 72-year-old male with a past urologic history significant for an elevated PSA with his PSA measuring at 5.1 and 5.2 in 2019, negative prostate biopsy in 2018, known history of very large prostate.  He was last seen in the office in January 2022.  At last visit his PSA was noted to be 2.7 while his prostate on digital rectal examination was noted to be very enlarged.  He was initiated on finasteride in 2019.  Per prior documentation in the notes his PSA has ranged from range is 2.6-9.03.  See PSA trend below. Since initiation of finasteride, PSA has been below 4.0 and remained relatively stable.  Patient was previously on an alpha 1 blocker but on documentation stated was not able to tolerate it well.  Any  questions today about prior therapies for BPH he states that he was prescribed Flomax but he never took it.  At baseline patient has frequency, urgency, nocturia x1-2 that is worse only when patient increases his water intake.  He does endorse some straining to urinate, hesitancy, intermittency at baseline.      On 1/19/2023, the patient presented to the emergency room for evaluation of right testicular pain and episode of gross hematuria.  A urine culture demonstrated> 100,000 CFU/milliliter of E. Coli. An ultrasound of the scrotum and testes was obtained and demonstrated hyperemia in the right epididymis with mildly complex bilateral hydroceles.  A CT renal protocol was also obtained during the emergency room which demonstrated 2 simple cyst in the right kidney.  Prostamegaly was also noted with radiology measuring the dimensions of his prostate as 8.6 x 7.6 x 8.1 cm in AP, transverse and longitudinal dimensions. No complex cyst, renal nodules/masses, nephrolithiasis or other reasons for gross hematuria noted.  Patient was diagnosed with right-sided epididymitis and given a dose of ceftriaxone.  He was sent home with a 10-day course of Levaquin and was told to follow-up with urology.  Today at his appointment, he notes that his orchalgia has resolved.  He also notes that his swelling has significantly reduced.         Hx 1/22/24      Patient presents for follow-up today.  He had a cystoscopy with Dr. Escobedo in April 2022 with demonstration of significant bilateral lobar hypertrophy with a large median lobe with no bladder lesions concerning for malignancy.  He was counseled that prostate artery embolization by IR or simple prostatectomy would be available to him if you like to pursue treatment for BPH with urinary tract symptoms.  He eventually decided to proceed with MAVIS PARDO and he underwent prostate artery embolization with IR in October 2023.  Since then patient states that he has had significant improvement  "urinary tract symptoms.  He is currently happy with his urinary symptoms.  He denies any dysuria, gross hematuria, flank pain.  He feels like he has an easier time emptying his bladder and he has a stronger stream with decrease in frequency/urgency     Hx 1/23/2025  Patient presents the office today for annual follow-up.  He is not on any pharmacotherapy coming off of the finasteride last visit because of ED.  He has been utilizing Cialis for ED and this has been effective no bothersome urinary tract symptoms.  PSA returned at 10 on 8/10/2024 which is the first PSA after stopping finasteride with recheck a month later returning at 5.8 with a free PSA of 33%.   most recent PSA came back at 6.8    ROS:   Review of Systems   Constitutional: Negative.  Negative for chills, fatigue and fever.   HENT: Negative.     Respiratory:  Negative for shortness of breath.    Cardiovascular:  Negative for chest pain.   Gastrointestinal: Negative.  Negative for abdominal pain.   Endocrine: Negative.    Musculoskeletal: Negative.    Skin: Negative.    Neurological: Negative.  Negative for dizziness and light-headedness.   Hematological: Negative.    Psychiatric/Behavioral: Negative.           Past Medical History  Past Medical History:   Diagnosis Date    Anemia     B12 def    Bunion of right foot 09/17/2020    Colon polyp     Constipation     COVID-19 06/2022    COVID-19 12/29/2023    Depression 05/31/2022    Glaucoma     suspect    Mixed hyperlipidemia 01/09/2019    Moderate mixed hyperlipidemia not requiring statin therapy 01/09/2019    Pre-syncope 05/31/2022    Spasm of abdominal muscles of left side 01/29/2020       Past Surgical History  Past Surgical History:   Procedure Laterality Date    ABDOMINAL SURGERY      \"intestine surgery\"     BUNIONECTOMY      COLONOSCOPY      HERNIA REPAIR  2010    HERNIA REPAIR  2016    HERNIA REPAIR      IR PROSTATE ARTERY EMBOLIZATION  10/2/2023    DE CORRJ HLX VLGS BNCTY [facility] DSTL METAR OSTEOT " Right 10/12/2020    Procedure: BUNIONECTOMY, DOUBLE OSTEOTOMY;  Surgeon: Troy Nobles DPM;  Location: [facility] MAIN OR;  Service: Podiatry    AL EXCISION TUMOR SOFT TIS BACK/FLANK SUBQ 3 CM/> N/A 11/12/2021    Procedure: EXCISION  BIOPSY LESION/MASS BACK;  Surgeon: Fahad Barroso MD;  Location: [facility];  Service: General    AL EXCISION TUMOR SOFT TISSUE SHOULDER SUBQ 3 CM/> Right 11/12/2021    Procedure: EXCISION BIOPSY TISSUE LESION/MASS UPPER EXTREMITY;  Surgeon: Fahad Barroso MD;  Location: [facility];  Service: General       Past Family History  Family History   Problem Relation Age of Onset    Hypertension Mother     Uterine cancer Sister        Past Social history  Social History     Socioeconomic History    Marital status: /Civil Union     Spouse name: Not on file    Number of children: Not on file    Years of education: Not on file    Highest education level: Not on file   Occupational History    Not on file   Tobacco Use    Smoking status: Never    Smokeless tobacco: Never   Vaping Use    Vaping status: Never Used   Substance and Sexual Activity    Alcohol use: Yes     Comment: socially    Drug use: Never    Sexual activity: Yes     Partners: Female   Other Topics Concern    Not on file   Social History Narrative    Not on file     Social Drivers of Health     Financial Resource Strain: Low Risk  (2/9/2024)    Overall Financial Resource Strain (CARDIA)     Difficulty of Paying Living Expenses: Not very hard   Food Insecurity: Not on file   Transportation Needs: No Transportation Needs (2/9/2024)    PRAPARE - Transportation     Lack of Transportation (Medical): No     Lack of Transportation (Non-Medical): No   Physical Activity: Not on file   Stress: Not on file   Social Connections: Not on file   Intimate Partner Violence: Not on file   Housing Stability: Not on file       Current Medications  Current Outpatient Medications   Medication Sig Dispense Refill    docusate sodium (COLACE) 100 mg  capsule Take 100 mg by mouth as needed       dorzolamide-timolol (COSOPT) 22.3-6.8 MG/ML ophthalmic solution       ferrous gluconate (FERGON) 240 (27 FE) MG tablet Take 1 tablet (240 mg total) by mouth in the morning 90 tablet 3    latanoprost (XALATAN) 0.005 % ophthalmic solution Administer 1 drop to both eyes daily at bedtime       Multiple Vitamins-Minerals (PRESERVISION AREDS PO) Take 1 capsule by mouth 2 (two) times a day      tadalafil (CIALIS) 10 MG tablet Take 1 tablet (10 mg total) by mouth daily as needed for erectile dysfunction 10 tablet 2    benzonatate (TESSALON) 200 MG capsule Take 1 capsule (200 mg total) by mouth 3 (three) times a day as needed for cough (Patient not taking: Reported on 1/23/2025) 30 capsule 0    bisacodyl (DULCOLAX) 5 mg EC tablet Take 1 tablet (5 mg total) by mouth daily as needed for constipation for up to 7 days 7 tablet 0    fluticasone (FLONASE) 50 mcg/act nasal spray SPRAY 2 SPRAYS INTO EACH NOSTRIL EVERY DAY (Patient not taking: Reported on 1/23/2025) 48 mL 1    ibuprofen (MOTRIN) 800 mg tablet Take 1 tablet (800 mg total) by mouth every 8 (eight) hours as needed for moderate pain or fever for up to 7 days 21 tablet 0    Multiple Vitamin (MULTIVITAMIN) tablet Take 1 tablet by mouth as needed (Patient not taking: Reported on 1/23/2025)       No current facility-administered medications for this visit.       Allergies  No Known Allergies    OBJECTIVE    Vitals   Vitals:    01/23/25 1110   BP: 122/74   BP Location: Left arm   Patient Position: Sitting   Cuff Size: Standard   Pulse: 59   SpO2: 96%   Weight: 68.9 kg (152 lb)   Height: 6' (1.829 m)       PVR:    Physical Exam  Constitutional:       General: He is not in acute distress.     Appearance: Normal appearance. He is normal weight. He is not ill-appearing or toxic-appearing.   HENT:      Head: Normocephalic and atraumatic.   Eyes:      Conjunctiva/sclera: Conjunctivae normal.   Cardiovascular:      Rate and Rhythm: Normal  rate.   Pulmonary:      Effort: Pulmonary effort is normal. No respiratory distress.   Genitourinary:     Comments: Prosta exam was performed today with evidence of significant prostamegaly.  Overall felt smooth with no nodularity appreciated  Skin:     General: Skin is warm and dry.   Neurological:      General: No focal deficit present.      Mental Status: He is alert and oriented to person, place, and time.      Cranial Nerves: No cranial nerve deficit.   Psychiatric:         Mood and Affect: Mood normal.         Behavior: Behavior normal.         Thought Content: Thought content normal.          Labs:     Lab Results   Component Value Date    PSA 6.817 (H) 01/16/2025    PSA 5.810 (H) 09/10/2024    PSA 10.037 (H) 08/05/2024    PSA 3.0 01/06/2023     Lab Results   Component Value Date    CREATININE 1.14 08/05/2024      Lab Results   Component Value Date    HGBA1C 5.6 06/25/2021     Lab Results   Component Value Date    CALCIUM 8.9 08/05/2024    K 4.1 08/05/2024    CO2 27 08/05/2024     08/05/2024    BUN 12 08/05/2024    CREATININE 1.14 08/05/2024       I have personally reviewed all pertinent lab results and reviewed with patient    Imaging       Cheikh Ibarra PA-C  Date: 1/23/2025 Time: 1:03 PM  [facility]    This note was written using fluency dictation software. Please excuse any resulting minor grammatical errors.

## 2025-02-10 ENCOUNTER — OFFICE VISIT (OUTPATIENT)
Dept: INTERNAL MEDICINE CLINIC | Facility: OTHER | Age: 75
End: 2025-02-10
Payer: COMMERCIAL

## 2025-02-10 VITALS
HEART RATE: 67 BPM | DIASTOLIC BLOOD PRESSURE: 78 MMHG | SYSTOLIC BLOOD PRESSURE: 124 MMHG | WEIGHT: 157 LBS | OXYGEN SATURATION: 99 % | HEIGHT: 72 IN | BODY MASS INDEX: 21.26 KG/M2 | TEMPERATURE: 98.2 F

## 2025-02-10 DIAGNOSIS — Z12.11 SCREENING FOR COLORECTAL CANCER: ICD-10-CM

## 2025-02-10 DIAGNOSIS — Z13.6 SCREENING FOR CARDIOVASCULAR CONDITION: ICD-10-CM

## 2025-02-10 DIAGNOSIS — Z12.5 SCREENING FOR PROSTATE CANCER: ICD-10-CM

## 2025-02-10 DIAGNOSIS — Z13.1 SCREENING FOR DIABETES MELLITUS: ICD-10-CM

## 2025-02-10 DIAGNOSIS — K59.09 OTHER CONSTIPATION: ICD-10-CM

## 2025-02-10 DIAGNOSIS — Z71.89 ADVANCED CARE PLANNING/COUNSELING DISCUSSION: ICD-10-CM

## 2025-02-10 DIAGNOSIS — Z83.2 FAMILY HISTORY OF THALASSEMIA: ICD-10-CM

## 2025-02-10 DIAGNOSIS — E44.1 MILD PROTEIN-CALORIE MALNUTRITION (HCC): ICD-10-CM

## 2025-02-10 DIAGNOSIS — Z12.12 SCREENING FOR COLORECTAL CANCER: ICD-10-CM

## 2025-02-10 DIAGNOSIS — D56.3 THALASSEMIA TRAIT: Primary | ICD-10-CM

## 2025-02-10 DIAGNOSIS — N18.30 STAGE 3 CHRONIC KIDNEY DISEASE, UNSPECIFIED WHETHER STAGE 3A OR 3B CKD (HCC): ICD-10-CM

## 2025-02-10 DIAGNOSIS — E55.9 VITAMIN D DEFICIENCY: ICD-10-CM

## 2025-02-10 DIAGNOSIS — N40.1 BPH WITH OBSTRUCTION/LOWER URINARY TRACT SYMPTOMS: ICD-10-CM

## 2025-02-10 DIAGNOSIS — N13.8 BPH WITH OBSTRUCTION/LOWER URINARY TRACT SYMPTOMS: ICD-10-CM

## 2025-02-10 PROBLEM — J40 BRONCHITIS: Status: RESOLVED | Noted: 2024-12-17 | Resolved: 2025-02-10

## 2025-02-10 PROCEDURE — G2211 COMPLEX E/M VISIT ADD ON: HCPCS | Performed by: INTERNAL MEDICINE

## 2025-02-10 PROCEDURE — 99214 OFFICE O/P EST MOD 30 MIN: CPT | Performed by: INTERNAL MEDICINE

## 2025-02-10 PROCEDURE — G0439 PPPS, SUBSEQ VISIT: HCPCS | Performed by: INTERNAL MEDICINE

## 2025-02-10 PROCEDURE — 99497 ADVNCD CARE PLAN 30 MIN: CPT | Performed by: INTERNAL MEDICINE

## 2025-02-10 NOTE — ASSESSMENT & PLAN NOTE
Continue to trend CBC and iron panel.   Orders:  •  CBC; Future  •  Comprehensive metabolic panel; Future

## 2025-02-10 NOTE — PATIENT INSTRUCTIONS
Medicare Preventive Visit Patient Instructions  Thank you for completing your Welcome to Medicare Visit or Medicare Annual Wellness Visit today. Your next wellness visit will be due in one year (2/11/2026).  The screening/preventive services that you may require over the next 5-10 years are detailed below. Some tests may not apply to you based off risk factors and/or age. Screening tests ordered at today's visit but not completed yet may show as past due. Also, please note that scanned in results may not display below.  Preventive Screenings:  Service Recommendations Previous Testing/Comments   Colorectal Cancer Screening  Colonoscopy    Fecal Occult Blood Test (FOBT)/Fecal Immunochemical Test (FIT)  Fecal DNA/Cologuard Test  Flexible Sigmoidoscopy Age: 45-75 years old   Colonoscopy: every 10 years (May be performed more frequently if at higher risk)  OR  FOBT/FIT: every 1 year  OR  Cologuard: every 3 years  OR  Sigmoidoscopy: every 5 years  Screening may be recommended earlier than age 45 if at higher risk for colorectal cancer. Also, an individualized decision between you and your healthcare provider will decide whether screening between the ages of 76-85 would be appropriate. Colonoscopy: 01/09/2023  FOBT/FIT: Not on file  Cologuard: Not on file  Sigmoidoscopy: Not on file    Screening Current     Prostate Cancer Screening Individualized decision between patient and health care provider in men between ages of 55-69   Medicare will cover every 12 months beginning on the day after your 50th birthday PSA: 6.817 ng/mL     Screening Not Indicated     Hepatitis C Screening Once for adults born between 1945 and 1965  More frequently in patients at high risk for Hepatitis C Hep C Antibody: 03/30/2016    Screening Current   Diabetes Screening 1-2 times per year if you're at risk for diabetes or have pre-diabetes Fasting glucose: 87 mg/dL (8/5/2024)  A1C: 5.6 % (6/25/2021)  Screening Current   Cholesterol Screening Once  every 5 years if you don't have a lipid disorder. May order more often based on risk factors. Lipid panel: 08/05/2024  Screening Current      Other Preventive Screenings Covered by Medicare:  Abdominal Aortic Aneurysm (AAA) Screening: covered once if your at risk. You're considered to be at risk if you have a family history of AAA or a male between the age of 65-75 who smoking at least 100 cigarettes in your lifetime.  Lung Cancer Screening: covers low dose CT scan once per year if you meet all of the following conditions: (1) Age 55-77; (2) No signs or symptoms of lung cancer; (3) Current smoker or have quit smoking within the last 15 years; (4) You have a tobacco smoking history of at least 20 pack years (packs per day x number of years you smoked); (5) You get a written order from a healthcare provider.  Glaucoma Screening: covered annually if you're considered high risk: (1) You have diabetes OR (2) Family history of glaucoma OR (3)  aged 50 and older OR (4)  American aged 65 and older  Osteoporosis Screening: covered every 2 years if you meet one of the following conditions: (1) Have a vertebral abnormality; (2) On glucocorticoid therapy for more than 3 months; (3) Have primary hyperparathyroidism; (4) On osteoporosis medications and need to assess response to drug therapy.  HIV Screening: covered annually if you're between the age of 15-65. Also covered annually if you are younger than 15 and older than 65 with risk factors for HIV infection. For pregnant patients, it is covered up to 3 times per pregnancy.    Immunizations:  Immunization Recommendations   Influenza Vaccine Annual influenza vaccination during flu season is recommended for all persons aged >= 6 months who do not have contraindications   Pneumococcal Vaccine   * Pneumococcal conjugate vaccine = PCV13 (Prevnar 13), PCV15 (Vaxneuvance), PCV20 (Prevnar 20)  * Pneumococcal polysaccharide vaccine = PPSV23 (Pneumovax) Adults  19-65 yo with certain risk factors or if 65+ yo  If never received any pneumonia vaccine: recommend Prevnar 20 (PCV20)  Give PCV20 if previously received 1 dose of PCV13 or PPSV23   Hepatitis B Vaccine 3 dose series if at intermediate or high risk (ex: diabetes, end stage renal disease, liver disease)   Respiratory syncytial virus (RSV) Vaccine - COVERED BY MEDICARE PART D  * RSVPreF3 (Arexvy) CDC recommends that adults 60 years of age and older may receive a single dose of RSV vaccine using shared clinical decision-making (SCDM)   Tetanus (Td) Vaccine - COST NOT COVERED BY MEDICARE PART B Following completion of primary series, a booster dose should be given every 10 years to maintain immunity against tetanus. Td may also be given as tetanus wound prophylaxis.   Tdap Vaccine - COST NOT COVERED BY MEDICARE PART B Recommended at least once for all adults. For pregnant patients, recommended with each pregnancy.   Shingles Vaccine (Shingrix) - COST NOT COVERED BY MEDICARE PART B  2 shot series recommended in those 19 years and older who have or will have weakened immune systems or those 50 years and older     Health Maintenance Due:      Topic Date Due   • Hepatitis C Screening  Completed   • Colorectal Cancer Screening  Discontinued     Immunizations Due:  There are no preventive care reminders to display for this patient.  Advance Directives   What are advance directives?  Advance directives are legal documents that state your wishes and plans for medical care. These plans are made ahead of time in case you lose your ability to make decisions for yourself. Advance directives can apply to any medical decision, such as the treatments you want, and if you want to donate organs.   What are the types of advance directives?  There are many types of advance directives, and each state has rules about how to use them. You may choose a combination of any of the following:  Living will:  This is a written record of the  treatment you want. You can also choose which treatments you do not want, which to limit, and which to stop at a certain time. This includes surgery, medicine, IV fluid, and tube feedings.   Durable power of  for healthcare (DPAHC):  This is a written record that states who you want to make healthcare choices for you when you are unable to make them for yourself. This person, called a proxy, is usually a family member or a friend. You may choose more than 1 proxy.  Do not resuscitate (DNR) order:  A DNR order is used in case your heart stops beating or you stop breathing. It is a request not to have certain forms of treatment, such as CPR. A DNR order may be included in other types of advance directives.  Medical directive:  This covers the care that you want if you are in a coma, near death, or unable to make decisions for yourself. You can list the treatments you want for each condition. Treatment may include pain medicine, surgery, blood transfusions, dialysis, IV or tube feedings, and a ventilator (breathing machine).  Values history:  This document has questions about your views, beliefs, and how you feel and think about life. This information can help others choose the care that you would choose.  Why are advance directives important?  An advance directive helps you control your care. Although spoken wishes may be used, it is better to have your wishes written down. Spoken wishes can be misunderstood, or not followed. Treatments may be given even if you do not want them. An advance directive may make it easier for your family to make difficult choices about your care.       © Copyright [employer] 2018 Information is for End User's use only and may not be sold, redistributed or otherwise used for commercial purposes. All illustrations and images included in CareNotes® are the copyrighted property of [employer] or [employer]

## 2025-02-10 NOTE — ASSESSMENT & PLAN NOTE
Lab Results   Component Value Date    EGFR 63 08/05/2024    EGFR 62 10/02/2023    EGFR 59 08/02/2023    CREATININE 1.14 08/05/2024    CREATININE 1.15 10/02/2023    CREATININE 1.20 08/02/2023   Controlled, continue to trend kidney function.

## 2025-02-10 NOTE — ASSESSMENT & PLAN NOTE
Moisés Pettit and I had a thorough discussion regarding advance care planning.  he has a Living Will.  ACP documentation provided to patient today.  he  understands that today's visit is a billable service.  Refer to ACP note.    Serious Illness Conversation    1. What is your understanding now of where you are with your illness?  Prognostic Understanding: appropriate understanding of prognosis     2. How much information about what is likely to be ahead with your illness would you like to have?  Information: patient wants to be fully informed     3. What did you (clinician) communicate to the patient?     4. If your health situation worsens, what are your most important goals?  Goals: be mentally aware, have my medical decisions respected, be physically comfortable, be at home, be emotionally at peace, be spiritually and emotionally at peace, not be a burden     5. What are the biggest fears and worries about the future and your health?  Fears/Worries: loss of control, being a financial burden, being dependent, being a physical burden, burdening others     6. What abilities are so critical to your life that you cannot imagine living without them?  Unacceptable Function: being unconscious     7. What gives you strength as you think about the future with your illness?     8. If you become sicker, how much are you willing to go through for the possibility of gaining more time?  Be in the hospital: Yes Have a feeding tube: Yes   Be in the ICU: Yes Live in a nursing home: Yes   Be on a ventilator: Yes Be uncomfortable: Yes   Be on dialysis: Yes Undergo aggressive test and/or procedures: Yes   9. How much does your proxy and family know about your priorities and wishes?  Discussion Discussion: extensive discussion with family about goals and wishes        How does this plan sound to you? I will do everything I can to help you through this.  Patient verbalized understanding of the plan     I have spent 16 minutes  speaking with my patient on advanced care planning today or during this visit     Advanced directives  Five Wishes: Patient does not have Five Wishes- would like information

## 2025-02-10 NOTE — PROGRESS NOTES
Name: Moisés Pettit      : 1950      MRN: [id number]  Encounter Provider: Benedict Garcia MD  Encounter Date: 2/10/2025   Encounter department: [facility] PRIMARY [facility] [place]    Assessment & Plan  Screening for diabetes mellitus         Screening for cardiovascular condition    Orders:  •  Lipid panel; Future    Screening for colorectal cancer         Screening for prostate cancer         Stage 3 chronic kidney disease, unspecified whether stage 3a or 3b CKD (HCC)  Lab Results   Component Value Date    EGFR 63 2024    EGFR 62 10/02/2023    EGFR 59 2023    CREATININE 1.14 2024    CREATININE 1.15 10/02/2023    CREATININE 1.20 2023   Controlled, continue to trend kidney function.        BPH with obstruction/lower urinary tract symptoms  Stable. Continue follow up with urology.        Advanced care planning/counseling discussion  Moisés Pettit and I had a thorough discussion regarding advance care planning.  he has a Living Will.  ACP documentation provided to patient today.  he  understands that today's visit is a billable service.  Refer to ACP note.    Serious Illness Conversation    1. What is your understanding now of where you are with your illness?  Prognostic Understanding: appropriate understanding of prognosis     2. How much information about what is likely to be ahead with your illness would you like to have?  Information: patient wants to be fully informed     3. What did you (clinician) communicate to the patient?     4. If your health situation worsens, what are your most important goals?  Goals: be mentally aware, have my medical decisions respected, be physically comfortable, be at home, be emotionally at peace, be spiritually and emotionally at peace, not be a burden     5. What are the biggest fears and worries about the future and your health?  Fears/Worries: loss of control, being a financial burden, being dependent, being a physical burden,  burdening others     6. What abilities are so critical to your life that you cannot imagine living without them?  Unacceptable Function: being unconscious     7. What gives you strength as you think about the future with your illness?     8. If you become sicker, how much are you willing to go through for the possibility of gaining more time?  Be in the hospital: Yes Have a feeding tube: Yes   Be in the ICU: Yes Live in a nursing home: Yes   Be on a ventilator: Yes Be uncomfortable: Yes   Be on dialysis: Yes Undergo aggressive test and/or procedures: Yes   9. How much does your proxy and family know about your priorities and wishes?  Discussion Discussion: extensive discussion with family about goals and wishes        How does this plan sound to you? I will do everything I can to help you through this.  Patient verbalized understanding of the plan     I have spent 16 minutes speaking with my patient on advanced care planning today or during this visit     Advanced directives  Five Wishes: Patient does not have Five Wishes- would like information              Vitamin D deficiency  Continue vitamin D supplementation.        Other constipation  Continue as needed bowel regimen.       Mild protein-calorie malnutrition (HCC)  Discussed diet and exercise.       Family history of thalassemia  Continue to trend CBC and Iron panel.         Thalassemia trait  Continue to trend CBC and iron panel.   Orders:  •  CBC; Future  •  Comprehensive metabolic panel; Future       Preventive health issues were discussed with patient, and age appropriate screening tests were ordered as noted in patient's After Visit Summary. Personalized health advice and appropriate referrals for health education or preventive services given if needed, as noted in patient's After Visit Summary.    History of Present Illness     Moisés Pettit is seen today for follow up of chronic conditions.   Recent laboratory studies reviewed today with the patient.    he has been compliant with his medication regimen.   Allergic rhinitis: controlled.   He follows up with urology. PSA improved and remains stable.   he has no complaints or concerns at this time.          Patient Care Team:  Benedict Garcia MD as PCP - General (Internal Medicine)  Benedict Garcia MD as PCP - PCP-[facility] ([facility])    Review of Systems   Constitutional:  Negative for activity change, appetite change, chills, diaphoresis, fatigue and fever.   HENT:  Negative for congestion, postnasal drip, rhinorrhea, sinus pressure, sinus pain, sneezing and sore throat.    Eyes:  Negative for visual disturbance.   Respiratory:  Negative for apnea, cough, choking, chest tightness, shortness of breath and wheezing.    Cardiovascular:  Negative for chest pain, palpitations and leg swelling.   Gastrointestinal:  Negative for abdominal distention, abdominal pain, anal bleeding, blood in stool, constipation, diarrhea, nausea and vomiting.   Endocrine: Negative for cold intolerance and heat intolerance.   Genitourinary:  Negative for difficulty urinating, dysuria and hematuria.   Musculoskeletal: Negative.    Skin: Negative.    Neurological:  Negative for dizziness, weakness, light-headedness, numbness and headaches.   Hematological:  Negative for adenopathy.   Psychiatric/Behavioral:  Negative for agitation, sleep disturbance and suicidal ideas.    All other systems reviewed and are negative.    Medical History Reviewed by provider this encounter:  Tobacco  Allergies  Meds  Problems  Med Hx  Surg Hx  Fam Hx       Annual Wellness Visit Questionnaire   Moisés is here for his Subsequent Wellness visit.     Health Risk Assessment:   Patient rates overall health as good. Patient feels that their physical health rating is slightly better. Patient is satisfied with their life. Eyesight was rated as same. Hearing was rated as same. Patient feels that their emotional and mental health rating is same. Patients states they  are sometimes angry. Patient states they are sometimes unusually tired/fatigued. Pain experienced in the last 7 days has been some. Patient's pain rating has been 6/10. Patient states that he has experienced no weight loss or gain in last 6 months. 3-4 days ago pain in upper body. Patient stated pain has subsided. Possible Gas.    Depression Screening:   PHQ-2 Score: 0      Fall Risk Screening:   In the past year, patient has experienced: no history of falling in past year      Home Safety:  Patient does not have trouble with stairs inside or outside of their home. Patient has working smoke alarms and has working carbon monoxide detector.     Nutrition:   Current diet is Regular.     Medications:   Patient is currently taking over-the-counter supplements. OTC medications include: see medication list. Patient is able to manage medications.     Activities of Daily Living (ADLs)/Instrumental Activities of Daily Living (IADLs):   Walk and transfer into and out of bed and chair?: Yes  Dress and groom yourself?: Yes    Bathe or shower yourself?: Yes    Feed yourself? Yes  Do your laundry/housekeeping?: Yes  Manage your money, pay your bills and track your expenses?: Yes  Make your own meals?: Yes    Do your own shopping?: Yes    Previous Hospitalizations:   Any hospitalizations or ED visits within the last 12 months?: No      Advance Care Planning:   Living will: Yes    Durable POA for healthcare: Yes    Advanced directive: Yes    Advanced directive counseling given: Yes    End of Life Decisions reviewed with patient: Yes    Provider agrees with end of life decisions: Yes      Cognitive Screening:   Provider or family/friend/caregiver concerned regarding cognition?: No    PREVENTIVE SCREENINGS      Cardiovascular Screening:    General: Screening Current and Risks and Benefits Discussed    Due for: Lipid Panel      Diabetes Screening:     General: Screening Current and Risks and Benefits Discussed    Due for: Blood  Glucose      Colorectal Cancer Screening:     General: Screening Current and Risks and Benefits Discussed      Prostate Cancer Screening:    General: Screening Not Indicated and Risks and Benefits Discussed    Due for: PSA      Osteoporosis Screening:    General: Screening Not Indicated      Abdominal Aortic Aneurysm (AAA) Screening:    Risk factors include: age between 65-76 yo        Lung Cancer Screening:     General: Screening Not Indicated      Hepatitis C Screening:    General: Screening Current and Risks and Benefits Discussed    Screening, Brief Intervention, and Referral to Treatment (SBIRT)    Screening  Typical number of drinks in a day: 0  Typical number of drinks in a week: 0  Interpretation: Low risk drinking behavior.    Single Item Drug Screening:  How often have you used an illegal drug (including marijuana) or a prescription medication for non-medical reasons in the past year? never    Single Item Drug Screen Score: 0  Interpretation: Negative screen for possible drug use disorder    Brief Intervention  Alcohol & drug use screenings were reviewed. No concerns regarding substance use disorder identified.     Other Counseling Topics:   Car/seat belt/driving safety, skin self-exam, sunscreen and calcium and vitamin D intake and regular weightbearing exercise.     Social Drivers of Health     Financial Resource Strain: Low Risk  (2/9/2024)    Overall Financial Resource Strain (CARDIA)    • Difficulty of Paying Living Expenses: Not very hard   Food Insecurity: No Food Insecurity (2/10/2025)    Hunger Vital Sign    • Worried About Running Out of Food in the Last Year: Never true    • Ran Out of Food in the Last Year: Never true   Transportation Needs: No Transportation Needs (2/10/2025)    PRAPARE - Transportation    • Lack of Transportation (Medical): No    • Lack of Transportation (Non-Medical): No   Housing Stability: Low Risk  (2/10/2025)    Housing Stability Vital Sign    • Unable to Pay for Housing  in the Last Year: No    • Number of Times Moved in the Last Year: 1    • Homeless in the Last Year: No   Utilities: Not At Risk (2/10/2025)    [facility] Utilities    • Threatened with loss of utilities: No     No results found.    Objective   /78 (BP Location: Left arm, Patient Position: Sitting, Cuff Size: Adult)   Pulse 67   Temp 98.2 °F (36.8 °C)   Ht 6' (1.829 m)   Wt 71.2 kg (157 lb)   SpO2 99%   BMI 21.29 kg/m²     Physical Exam  Vitals and nursing note reviewed.   Constitutional:       General: He is not in acute distress.     Appearance: He is well-developed. He is not diaphoretic.   HENT:      Head: Normocephalic and atraumatic.   Eyes:      General: No scleral icterus.        Right eye: No discharge.         Left eye: No discharge.      Conjunctiva/sclera: Conjunctivae normal.      Pupils: Pupils are equal, round, and reactive to light.   Neck:      Thyroid: No thyromegaly.      Vascular: No JVD.   Cardiovascular:      Rate and Rhythm: Normal rate and regular rhythm.      Heart sounds: Normal heart sounds. No murmur heard.     No friction rub. No gallop.   Pulmonary:      Effort: Pulmonary effort is normal. No respiratory distress.      Breath sounds: Normal breath sounds. No wheezing or rales.   Chest:      Chest wall: No tenderness.   Abdominal:      General: Bowel sounds are normal. There is no distension.      Palpations: Abdomen is soft. There is no mass.      Tenderness: There is no abdominal tenderness. There is no guarding or rebound.   Musculoskeletal:         General: No tenderness or deformity. Normal range of motion.      Cervical back: Normal range of motion and neck supple.   Lymphadenopathy:      Cervical: No cervical adenopathy.   Skin:     General: Skin is warm and dry.      Coloration: Skin is not pale.      Findings: No erythema or rash.   Neurological:      Mental Status: He is alert and oriented to person, place, and time.      Cranial Nerves: No cranial nerve deficit.       Coordination: Coordination normal.      Deep Tendon Reflexes: Reflexes are normal and symmetric.   Psychiatric:         Behavior: Behavior normal.         Thought Content: Thought content normal.         Judgment: Judgment normal.

## 2025-04-16 ENCOUNTER — OFFICE VISIT (OUTPATIENT)
Dept: INTERNAL MEDICINE CLINIC | Facility: OTHER | Age: 75
End: 2025-04-16
Payer: COMMERCIAL

## 2025-04-16 VITALS
TEMPERATURE: 97.5 F | RESPIRATION RATE: 20 BRPM | DIASTOLIC BLOOD PRESSURE: 70 MMHG | BODY MASS INDEX: 20.56 KG/M2 | WEIGHT: 151.8 LBS | OXYGEN SATURATION: 97 % | HEART RATE: 68 BPM | SYSTOLIC BLOOD PRESSURE: 110 MMHG | HEIGHT: 72 IN

## 2025-04-16 DIAGNOSIS — L98.9 SKIN LESION: ICD-10-CM

## 2025-04-16 DIAGNOSIS — L91.8 FIBROEPITHELIAL POLYP: ICD-10-CM

## 2025-04-16 DIAGNOSIS — M62.830 BACK SPASM: Primary | ICD-10-CM

## 2025-04-16 PROCEDURE — 88304 TISSUE EXAM BY PATHOLOGIST: CPT | Performed by: PATHOLOGY

## 2025-04-16 PROCEDURE — 11300 SHAVE SKIN LESION 0.5 CM/<: CPT | Performed by: INTERNAL MEDICINE

## 2025-04-16 PROCEDURE — 99213 OFFICE O/P EST LOW 20 MIN: CPT | Performed by: INTERNAL MEDICINE

## 2025-04-16 RX ORDER — TIMOLOL MALEATE 5 MG/ML
1 SOLUTION/ DROPS OPHTHALMIC DAILY
COMMUNITY
Start: 2025-04-03

## 2025-04-16 RX ORDER — METHOCARBAMOL 500 MG/1
500 TABLET, FILM COATED ORAL 2 TIMES DAILY PRN
Qty: 30 TABLET | Refills: 0 | Status: SHIPPED | OUTPATIENT
Start: 2025-04-16

## 2025-04-16 NOTE — ASSESSMENT & PLAN NOTE
Discussed continue range of motion and stretching exercises as tolerated.  Will prescribe methocarbamol 500 mg twice daily as needed for spasms.  He is to contact our office for persistent or worsening symptoms.  Orders:  •  methocarbamol (ROBAXIN) 500 mg tablet; Take 1 tablet (500 mg total) by mouth 2 (two) times a day as needed for muscle spasms

## 2025-04-16 NOTE — PROGRESS NOTES
Name: Moisés Pettit      : 1950      MRN: [id number]  Encounter Provider: Benedict Garcia MD  Encounter Date: 2025   Encounter department: [facility]  :  Assessment & Plan  Skin lesion  Skin lesion removed today.  Will send for biopsy.  Orders:  •  Tissue Exam  •  Shave lesion    Back spasm  Discussed continue range of motion and stretching exercises as tolerated.  Will prescribe methocarbamol 500 mg twice daily as needed for spasms.  He is to contact our office for persistent or worsening symptoms.  Orders:  •  methocarbamol (ROBAXIN) 500 mg tablet; Take 1 tablet (500 mg total) by mouth 2 (two) times a day as needed for muscle spasms           History of Present Illness   Moisés Pettit is seen today with concern for left lower back and LLQ abdominal pain after using the abdominal crunch machine at the gym, occurred 1.5 weeks ago.   He has been alternating between heat and ice therapy. He was also taking tylenol. He reports improvement of his symptoms.  He feels occasional pain whenever twisting awkwardly.    He has a skin lesion on his right hip he would like removed due to its size and it often getting caught on clothing.    Back Pain  Associated symptoms include abdominal pain. Pertinent negatives include no chest pain, dysuria or fever.   Abdominal Pain  Pertinent negatives include no constipation, diarrhea, dysuria, fever, hematuria or nausea.     Review of Systems   Constitutional: Negative.  Negative for chills, fatigue and fever.   HENT:  Negative for congestion, ear pain, postnasal drip, rhinorrhea and sore throat.    Eyes: Negative.    Respiratory:  Negative for cough, chest tightness, shortness of breath and wheezing.    Cardiovascular:  Negative for chest pain and palpitations.   Gastrointestinal:  Positive for abdominal pain. Negative for abdominal distention, blood in stool, constipation, diarrhea and nausea.   Endocrine: Negative.     Genitourinary:  Negative for difficulty urinating, dysuria and hematuria.   Musculoskeletal:  Positive for back pain.   Skin: Negative.    Allergic/Immunologic: Negative for environmental allergies and food allergies.   Neurological: Negative.    Hematological:  Negative for adenopathy.   Psychiatric/Behavioral:  Negative for agitation, behavioral problems, confusion and sleep disturbance.    All other systems reviewed and are negative.      Objective   /70 (BP Location: Left arm, Patient Position: Sitting, Cuff Size: Standard)   Pulse 68   Temp 97.5 °F (36.4 °C) (Temporal)   Resp 20   Ht 6' (1.829 m)   Wt 68.9 kg (151 lb 12.8 oz)   SpO2 97%   BMI 20.59 kg/m²      Physical Exam  Vitals and nursing note reviewed.   Constitutional:       General: He is not in acute distress.     Appearance: He is well-developed. He is not diaphoretic.   HENT:      Head: Normocephalic and atraumatic.   Eyes:      General: No scleral icterus.        Right eye: No discharge.         Left eye: No discharge.      Conjunctiva/sclera: Conjunctivae normal.      Pupils: Pupils are equal, round, and reactive to light.   Neck:      Thyroid: No thyromegaly.      Vascular: No JVD.   Cardiovascular:      Rate and Rhythm: Normal rate and regular rhythm.      Heart sounds: Normal heart sounds. No murmur heard.     No friction rub. No gallop.   Pulmonary:      Effort: Pulmonary effort is normal. No respiratory distress.      Breath sounds: Normal breath sounds. No wheezing or rales.   Chest:      Chest wall: No tenderness.   Abdominal:      General: Bowel sounds are normal. There is no distension.      Palpations: Abdomen is soft. There is no mass.      Tenderness: There is no abdominal tenderness. There is no guarding or rebound.   Musculoskeletal:         General: No tenderness or deformity. Normal range of motion.      Cervical back: Normal range of motion and neck supple.   Lymphadenopathy:      Cervical: No cervical adenopathy.    Skin:     General: Skin is warm and dry.      Coloration: Skin is not pale.      Findings: No erythema or rash.          Neurological:      Mental Status: He is alert and oriented to person, place, and time.      Cranial Nerves: No cranial nerve deficit.      Coordination: Coordination normal.      Deep Tendon Reflexes: Reflexes are normal and symmetric.   Psychiatric:         Behavior: Behavior normal.         Thought Content: Thought content normal.         Judgment: Judgment normal.       Shave lesion    Date/Time: 4/16/2025 1:00 PM    Performed by: Benedict Garcia MD  Authorized by: Benedict Garcia MD  Universal Protocol:  procedure performed by consultantConsent: Verbal consent obtained.  Risks and benefits: risks, benefits and alternatives were discussed  Consent given by: patient  Patient understanding: patient states understanding of the procedure being performed  Patient identity confirmed: verbally with patient    Number of Lesions: 1  Lesion 1:     Body area: lower extremity    Lower extremity location: R hip    Initial size (mm): 15    Malignancy: malignancy unknown      Destruction method: shave removal

## 2025-04-18 PROCEDURE — 88304 TISSUE EXAM BY PATHOLOGIST: CPT | Performed by: PATHOLOGY

## 2025-04-23 ENCOUNTER — RESULTS FOLLOW-UP (OUTPATIENT)
Dept: INTERNAL MEDICINE CLINIC | Facility: OTHER | Age: 75
End: 2025-04-23

## 2025-06-27 ENCOUNTER — TELEPHONE (OUTPATIENT)
Age: 75
End: 2025-06-27

## 2025-06-27 ENCOUNTER — APPOINTMENT (OUTPATIENT)
Dept: LAB | Facility: CLINIC | Age: 75
End: 2025-06-27
Payer: COMMERCIAL

## 2025-06-27 DIAGNOSIS — R97.20 ELEVATED PSA: Primary | ICD-10-CM

## 2025-06-27 DIAGNOSIS — D56.3 THALASSEMIA TRAIT: ICD-10-CM

## 2025-06-27 DIAGNOSIS — R97.20 ELEVATED PSA: ICD-10-CM

## 2025-06-27 LAB
PSA FREE MFR SERPL: 26.16 %
PSA FREE SERPL-MCNC: 1.84 NG/ML
PSA SERPL-MCNC: 7.04 NG/ML (ref 0–4)

## 2025-06-27 PROCEDURE — 84154 ASSAY OF PSA FREE: CPT

## 2025-06-27 PROCEDURE — 36415 COLL VENOUS BLD VENIPUNCTURE: CPT

## 2025-06-27 PROCEDURE — 84153 ASSAY OF PSA TOTAL: CPT

## 2025-06-27 NOTE — TELEPHONE ENCOUNTER
Pt currently at the [facility] on [address] to get his PSA but has no order in his chart.    Warm transferred to CTS

## 2025-07-02 NOTE — PROGRESS NOTES
Name: Moisés Pettit      : 1950      MRN: [id number]  Encounter Provider: SUE Soto  Encounter Date: 7/10/2025   Encounter department: [facility] UROLOGY [place]  :  Assessment & Plan  Elevated PSA  S/P negative prostate biopsy in   Multiparametric MRI of the prostate in 2023 demonstrated PI-RADS 2 with prostamegaly at 245 g    PSA   7.045 on 25 with 26.160 PSA % free  6.817 on 25 with 32.404 PSA % free  5.810 on 9/10/24 with 33.098 PSA % free (confirmatory PSA, off of finasteride)  10.037 on 24 (off of finasteride)  3.0 on 23 (6.0 on finasteride)    History of fluctuating PSAs. Discussed with patient how the prostate density of 0.028 and PSA percentage free of 26.160 is reassuring.  Suspect elevated PSAs are due to prostatomegaly.    YULIA today was smooth with no nodularity appreciated. Prostatomegaly noted.  Avoid strenuous activity, riding a lawnmower, and ejaculation 48 to 72 hours prior to PSA.   Follow-up in 1 year with repeat PSA.    Orders:    PSA Total, Diagnostic; Future    Erectile dysfunction, unspecified erectile dysfunction type  Utilizing Cialis 10 mg with good erections.  Denies any side effects. Refill provided of Cialis.     Orders:    tadalafil (CIALIS) 10 MG tablet; Take 1 tablet (10 mg total) by mouth daily as needed for erectile dysfunction    Benign prostatic hyperplasia, unspecified whether lower urinary tract symptoms present  S/p prostate artery embolization in 2023 with significant improvement of urinary symptoms  Patient denies any bothersome urinary symptoms.  Overall he is satisfied with his urinary symptoms.           History of Present Illness   Moisés Pettit is a 74 y.o. male who presents today for a follow-up regarding BPH with urinary tract symptoms, erectile dysfunction, and elevated PSA.  Patient underwent a negative prostate biopsy in . On 2023 corrected PSA was 6.0 on finasteride. A multi parametric  MRI of the prostate in February 2023 demonstrated PI-RADS 2 with prostamegaly at 245 g. Patient is status post prostate artery embolization in October 2023.  He had significant improvement in urinary symptoms following the procedure. He is not on pharmacotherapy at this time. Repeat PSA on 8/5/2024 was 10.037 while patient was off of finasteride.  Patient stopped the finasteride due to sexual dysfunction side effect. Confirmatory PSA repeated on 9/10/2024 was 5.810 with 33.098 PSA percentage free.  On 1/16/25 PSA was 6.817 with 32.404 PSA percentage free. Patient was last seen in the office on 1/23/2025.      Today he reports he is doing well overall.  He denies any bothersome urinary symptoms.  Denies gross hematuria, frequency, straining, and feelings of incomplete bladder emptying.  He has nocturia 0-1 X.  Occasionally he will have urgency with urge incontinence if he is stuck in traffic and unable to access a restroom.  Patient drinks mainly water throughout the day.  He is a non-smoker.  Patient denies any family history of prostate cancer.      Review of Systems   Constitutional:  Negative for chills and fever.   Genitourinary:  Positive for urgency. Negative for difficulty urinating, dysuria, flank pain, frequency and hematuria.        Nocturia 0-1   Musculoskeletal:  Negative for back pain.   Skin:  Negative for wound.          Objective   There were no vitals taken for this visit.    Physical Exam  Constitutional:       Appearance: Normal appearance.   HENT:      Head: Normocephalic and atraumatic.   Pulmonary:      Effort: Pulmonary effort is normal.   Genitourinary:     Comments: YULIA today was smooth with no nodularity appreciated. Prostatomegaly noted.    Skin:     General: Skin is warm.     Neurological:      General: No focal deficit present.      Mental Status: He is alert.      Gait: Gait normal.     Psychiatric:         Mood and Affect: Mood normal.         Results   Lab Results   Component Value  Date    PSA 7.045 (H) 06/27/2025    PSA 6.817 (H) 01/16/2025    PSA 5.810 (H) 09/10/2024     Lab Results   Component Value Date    CALCIUM 8.9 08/05/2024    K 4.1 08/05/2024    CO2 27 08/05/2024     08/05/2024    BUN 12 08/05/2024    CREATININE 1.14 08/05/2024     Lab Results   Component Value Date    WBC 5.40 01/16/2025    HGB 14.7 01/16/2025    HCT 47.3 01/16/2025    MCV 71 (L) 01/16/2025     01/16/2025       Office Urine Dip  No results found for this or any previous visit (from the past hour).

## 2025-07-10 ENCOUNTER — OFFICE VISIT (OUTPATIENT)
Dept: UROLOGY | Facility: AMBULATORY SURGERY CENTER | Age: 75
End: 2025-07-10
Payer: COMMERCIAL

## 2025-07-10 VITALS
SYSTOLIC BLOOD PRESSURE: 126 MMHG | HEIGHT: 72 IN | BODY MASS INDEX: 19.64 KG/M2 | HEART RATE: 69 BPM | OXYGEN SATURATION: 99 % | DIASTOLIC BLOOD PRESSURE: 64 MMHG | WEIGHT: 145 LBS

## 2025-07-10 DIAGNOSIS — N52.9 ERECTILE DYSFUNCTION, UNSPECIFIED ERECTILE DYSFUNCTION TYPE: ICD-10-CM

## 2025-07-10 DIAGNOSIS — R97.20 ELEVATED PSA: Primary | ICD-10-CM

## 2025-07-10 DIAGNOSIS — N40.0 BENIGN PROSTATIC HYPERPLASIA, UNSPECIFIED WHETHER LOWER URINARY TRACT SYMPTOMS PRESENT: ICD-10-CM

## 2025-07-10 PROCEDURE — 99213 OFFICE O/P EST LOW 20 MIN: CPT

## 2025-07-10 RX ORDER — TADALAFIL 10 MG/1
10 TABLET ORAL DAILY PRN
Qty: 10 TABLET | Refills: 2 | Status: SHIPPED | OUTPATIENT
Start: 2025-07-10

## 2025-08-14 ENCOUNTER — APPOINTMENT (OUTPATIENT)
Dept: LAB | Facility: CLINIC | Age: 75
End: 2025-08-14
Payer: COMMERCIAL

## 2025-08-14 LAB
ERYTHROCYTE [DISTWIDTH] IN BLOOD BY AUTOMATED COUNT: 16.7 % (ref 11.6–15.1)
HCT VFR BLD AUTO: 43.4 % (ref 36.5–49.3)
HGB BLD-MCNC: 13.3 G/DL (ref 12–17)
MCH RBC QN AUTO: 21.7 PG (ref 26.8–34.3)
MCHC RBC AUTO-ENTMCNC: 30.6 G/DL (ref 31.4–37.4)
MCV RBC AUTO: 71 FL (ref 82–98)
PLATELET # BLD AUTO: 245 THOUSANDS/UL (ref 149–390)
PMV BLD AUTO: 11.3 FL (ref 8.9–12.7)
RBC # BLD AUTO: 6.12 MILLION/UL (ref 3.88–5.62)
WBC # BLD AUTO: 4.41 THOUSAND/UL (ref 4.31–10.16)

## 2025-08-15 ENCOUNTER — TELEPHONE (OUTPATIENT)
Dept: ADMINISTRATIVE | Facility: OTHER | Age: 75
End: 2025-08-15

## 2025-08-19 ENCOUNTER — NURSE TRIAGE (OUTPATIENT)
Age: 75
End: 2025-08-19

## 2025-08-19 ENCOUNTER — OFFICE VISIT (OUTPATIENT)
Dept: INTERNAL MEDICINE CLINIC | Facility: OTHER | Age: 75
End: 2025-08-19
Payer: COMMERCIAL

## 2025-08-19 VITALS
HEIGHT: 72 IN | BODY MASS INDEX: 20.15 KG/M2 | RESPIRATION RATE: 18 BRPM | WEIGHT: 148.8 LBS | DIASTOLIC BLOOD PRESSURE: 68 MMHG | SYSTOLIC BLOOD PRESSURE: 110 MMHG | TEMPERATURE: 98.3 F | OXYGEN SATURATION: 99 % | HEART RATE: 62 BPM

## 2025-08-19 DIAGNOSIS — E55.9 VITAMIN D DEFICIENCY: ICD-10-CM

## 2025-08-19 DIAGNOSIS — N40.1 BPH WITH OBSTRUCTION/LOWER URINARY TRACT SYMPTOMS: Primary | ICD-10-CM

## 2025-08-19 DIAGNOSIS — N13.8 BPH WITH OBSTRUCTION/LOWER URINARY TRACT SYMPTOMS: Primary | ICD-10-CM

## 2025-08-19 DIAGNOSIS — D56.3 THALASSEMIA TRAIT: ICD-10-CM

## 2025-08-19 DIAGNOSIS — N18.30 STAGE 3 CHRONIC KIDNEY DISEASE, UNSPECIFIED WHETHER STAGE 3A OR 3B CKD (HCC): ICD-10-CM

## 2025-08-19 DIAGNOSIS — K59.09 OTHER CONSTIPATION: ICD-10-CM

## 2025-08-19 PROBLEM — M62.830 BACK SPASM: Status: RESOLVED | Noted: 2025-04-16 | Resolved: 2025-08-19

## 2025-08-19 PROCEDURE — 99214 OFFICE O/P EST MOD 30 MIN: CPT | Performed by: INTERNAL MEDICINE

## (undated) DEVICE — DRAPE EQUIPMENT RF WAND

## (undated) DEVICE — CANNULATED DRILL: Brand: FIXOS

## (undated) DEVICE — BETHLEHEM UNIVERSAL  MIONR EXT: Brand: CARDINAL HEALTH

## (undated) DEVICE — STERILE POLYISOPRENE POWDER-FREE SURGICAL GLOVES: Brand: PROTEXIS

## (undated) DEVICE — HEADLESS COMPRESSION SCREW
Type: IMPLANTABLE DEVICE | Site: FOOT | Status: NON-FUNCTIONAL
Brand: FIXOS
Removed: 2020-10-12

## (undated) DEVICE — GAUZE SPONGES,16 PLY: Brand: CURITY

## (undated) DEVICE — CUFF TOURNIQUET DISP SZ18

## (undated) DEVICE — NEEDLE 25G X 1 1/2

## (undated) DEVICE — SYRINGE 10ML LL

## (undated) DEVICE — SUT ETHILON 4-0 PS-2 18 IN 1667H

## (undated) DEVICE — STERILE POLYISOPRENE POWDER-FREE SURGICAL GLOVES WITH EMOLLIENT COATING: Brand: PROTEXIS

## (undated) DEVICE — TUBING SUCTION 5MM X 12 FT

## (undated) DEVICE — SCD SEQUENTIAL COMPRESSION COMFORT SLEEVE MEDIUM KNEE LENGTH: Brand: KENDALL SCD

## (undated) DEVICE — PENCIL ELECTROSURG E-Z CLEAN -0035H

## (undated) DEVICE — SINGLE PORT MANIFOLD: Brand: NEPTUNE 2

## (undated) DEVICE — GLOVE SRG BIOGEL ECLIPSE 7

## (undated) DEVICE — INTENDED FOR TISSUE SEPARATION, AND OTHER PROCEDURES THAT REQUIRE A SHARP SURGICAL BLADE TO PUNCTURE OR CUT.: Brand: BARD-PARKER SAFETY BLADES SIZE 15, STERILE

## (undated) DEVICE — OCCLUSIVE GAUZE STRIP,3% BISMUTH TRIBROMOPHENATE IN PETROLATUM BLEND: Brand: XEROFORM

## (undated) DEVICE — NEEDLE BLUNT 18 G X 1 1/2IN

## (undated) DEVICE — UNTHREADED GUIDE WIRE: Brand: FIXOS

## (undated) DEVICE — POSITIONING PIN

## (undated) DEVICE — CANNULATED COUNTERSINK: Brand: FIXOS

## (undated) DEVICE — NON-THREADED KWIRE
Type: IMPLANTABLE DEVICE | Site: FOOT | Status: NON-FUNCTIONAL
Brand: MINI
Removed: 2020-10-12

## (undated) DEVICE — SYRINGE 10ML LL CONTROL TOP

## (undated) DEVICE — UNTHREADED GUIDE WIRE
Type: IMPLANTABLE DEVICE | Site: FOOT | Status: NON-FUNCTIONAL
Brand: FIXOS
Removed: 2020-10-12

## (undated) DEVICE — VIAL DECANTER

## (undated) DEVICE — CHLORAPREP HI-LITE 26ML ORANGE

## (undated) DEVICE — CANNULATED DRILL BIT: Brand: MINI

## (undated) DEVICE — STANDARD DRILL BIT , AO

## (undated) DEVICE — INTENDED FOR TISSUE SEPARATION, AND OTHER PROCEDURES THAT REQUIRE A SHARP SURGICAL BLADE TO PUNCTURE OR CUT.: Brand: BARD-PARKER ® SAFETYLOCK CARBON RIB-BACK BLADES

## (undated) DEVICE — BETHLEHEM UNIVERSAL MINOR GEN: Brand: CARDINAL HEALTH

## (undated) DEVICE — COBAN 4 IN STERILE

## (undated) DEVICE — LIGHT HANDLE COVER SLEEVE DISP BLUE STELLAR

## (undated) DEVICE — ADHESIVE SKIN HIGH VISCOSITY EXOFIN 1ML

## (undated) DEVICE — STRETCH BANDAGE: Brand: CURITY

## (undated) DEVICE — DRAPE C-ARM X-RAY

## (undated) DEVICE — THIN OFFSET (9.0 X 0.38 X 25.0MM)